# Patient Record
Sex: MALE | Race: ASIAN
[De-identification: names, ages, dates, MRNs, and addresses within clinical notes are randomized per-mention and may not be internally consistent; named-entity substitution may affect disease eponyms.]

---

## 2017-03-20 ENCOUNTER — NON-APPOINTMENT (OUTPATIENT)
Age: 67
End: 2017-03-20

## 2017-03-20 ENCOUNTER — APPOINTMENT (OUTPATIENT)
Dept: CARDIOLOGY | Facility: CLINIC | Age: 67
End: 2017-03-20

## 2017-03-20 VITALS
HEART RATE: 62 BPM | WEIGHT: 151 LBS | HEIGHT: 63 IN | DIASTOLIC BLOOD PRESSURE: 64 MMHG | OXYGEN SATURATION: 97 % | SYSTOLIC BLOOD PRESSURE: 148 MMHG | BODY MASS INDEX: 26.75 KG/M2

## 2017-03-20 VITALS — SYSTOLIC BLOOD PRESSURE: 130 MMHG | DIASTOLIC BLOOD PRESSURE: 72 MMHG

## 2017-03-24 ENCOUNTER — APPOINTMENT (OUTPATIENT)
Dept: CARDIOLOGY | Facility: CLINIC | Age: 67
End: 2017-03-24

## 2017-03-28 ENCOUNTER — APPOINTMENT (OUTPATIENT)
Dept: CARDIOLOGY | Facility: CLINIC | Age: 67
End: 2017-03-28

## 2017-03-30 ENCOUNTER — APPOINTMENT (OUTPATIENT)
Dept: CARDIOLOGY | Facility: CLINIC | Age: 67
End: 2017-03-30

## 2017-04-10 ENCOUNTER — APPOINTMENT (OUTPATIENT)
Dept: CARDIOLOGY | Facility: CLINIC | Age: 67
End: 2017-04-10

## 2017-04-10 VITALS
HEIGHT: 63 IN | HEART RATE: 71 BPM | BODY MASS INDEX: 26.75 KG/M2 | SYSTOLIC BLOOD PRESSURE: 127 MMHG | DIASTOLIC BLOOD PRESSURE: 72 MMHG | WEIGHT: 151 LBS | OXYGEN SATURATION: 98 %

## 2017-06-26 ENCOUNTER — MEDICATION RENEWAL (OUTPATIENT)
Age: 67
End: 2017-06-26

## 2017-06-26 ENCOUNTER — RX RENEWAL (OUTPATIENT)
Age: 67
End: 2017-06-26

## 2017-09-11 ENCOUNTER — NON-APPOINTMENT (OUTPATIENT)
Age: 67
End: 2017-09-11

## 2017-09-11 ENCOUNTER — APPOINTMENT (OUTPATIENT)
Dept: CARDIOLOGY | Facility: CLINIC | Age: 67
End: 2017-09-11
Payer: COMMERCIAL

## 2017-09-11 VITALS
BODY MASS INDEX: 26.75 KG/M2 | WEIGHT: 151 LBS | OXYGEN SATURATION: 98 % | DIASTOLIC BLOOD PRESSURE: 68 MMHG | HEART RATE: 72 BPM | HEIGHT: 63 IN | SYSTOLIC BLOOD PRESSURE: 136 MMHG

## 2017-09-11 PROCEDURE — 93000 ELECTROCARDIOGRAM COMPLETE: CPT

## 2017-09-11 PROCEDURE — 99215 OFFICE O/P EST HI 40 MIN: CPT | Mod: 25

## 2017-09-14 ENCOUNTER — INPATIENT (INPATIENT)
Facility: HOSPITAL | Age: 67
LOS: 0 days | Discharge: HOME | DRG: 247 | End: 2017-09-15
Attending: INTERNAL MEDICINE | Admitting: INTERNAL MEDICINE
Payer: COMMERCIAL

## 2017-09-14 VITALS
HEART RATE: 66 BPM | TEMPERATURE: 99 F | HEIGHT: 63 IN | DIASTOLIC BLOOD PRESSURE: 69 MMHG | WEIGHT: 151.02 LBS | RESPIRATION RATE: 15 BRPM | OXYGEN SATURATION: 98 % | SYSTOLIC BLOOD PRESSURE: 150 MMHG

## 2017-09-14 DIAGNOSIS — Z01.818 ENCOUNTER FOR OTHER PREPROCEDURAL EXAMINATION: ICD-10-CM

## 2017-09-14 DIAGNOSIS — Z98.49 CATARACT EXTRACTION STATUS, UNSPECIFIED EYE: Chronic | ICD-10-CM

## 2017-09-14 DIAGNOSIS — I25.10 ATHEROSCLEROTIC HEART DISEASE OF NATIVE CORONARY ARTERY WITHOUT ANGINA PECTORIS: ICD-10-CM

## 2017-09-14 DIAGNOSIS — Z98.890 OTHER SPECIFIED POSTPROCEDURAL STATES: Chronic | ICD-10-CM

## 2017-09-14 DIAGNOSIS — Z95.9 PRESENCE OF CARDIAC AND VASCULAR IMPLANT AND GRAFT, UNSPECIFIED: Chronic | ICD-10-CM

## 2017-09-14 LAB
ALBUMIN SERPL ELPH-MCNC: 4.2 G/DL — SIGNIFICANT CHANGE UP (ref 3.3–5)
ALP SERPL-CCNC: 58 U/L — SIGNIFICANT CHANGE UP (ref 40–120)
ALT FLD-CCNC: 24 U/L RC — SIGNIFICANT CHANGE UP (ref 10–45)
ANION GAP SERPL CALC-SCNC: 11 MMOL/L — SIGNIFICANT CHANGE UP (ref 5–17)
AST SERPL-CCNC: 29 U/L — SIGNIFICANT CHANGE UP (ref 10–40)
BILIRUB SERPL-MCNC: 0.7 MG/DL — SIGNIFICANT CHANGE UP (ref 0.2–1.2)
BUN SERPL-MCNC: 12 MG/DL — SIGNIFICANT CHANGE UP (ref 7–23)
CALCIUM SERPL-MCNC: 9.8 MG/DL — SIGNIFICANT CHANGE UP (ref 8.4–10.5)
CHLORIDE SERPL-SCNC: 104 MMOL/L — SIGNIFICANT CHANGE UP (ref 96–108)
CO2 SERPL-SCNC: 26 MMOL/L — SIGNIFICANT CHANGE UP (ref 22–31)
CREAT SERPL-MCNC: 0.62 MG/DL — SIGNIFICANT CHANGE UP (ref 0.5–1.3)
GLUCOSE SERPL-MCNC: 104 MG/DL — HIGH (ref 70–99)
HCT VFR BLD CALC: 45.1 % — SIGNIFICANT CHANGE UP (ref 39–50)
HGB BLD-MCNC: 15.3 G/DL — SIGNIFICANT CHANGE UP (ref 13–17)
MCHC RBC-ENTMCNC: 30 PG — SIGNIFICANT CHANGE UP (ref 27–34)
MCHC RBC-ENTMCNC: 34 GM/DL — SIGNIFICANT CHANGE UP (ref 32–36)
MCV RBC AUTO: 88.3 FL — SIGNIFICANT CHANGE UP (ref 80–100)
PLATELET # BLD AUTO: 197 K/UL — SIGNIFICANT CHANGE UP (ref 150–400)
POTASSIUM SERPL-MCNC: 4.2 MMOL/L — SIGNIFICANT CHANGE UP (ref 3.5–5.3)
POTASSIUM SERPL-SCNC: 4.2 MMOL/L — SIGNIFICANT CHANGE UP (ref 3.5–5.3)
PROT SERPL-MCNC: 7.4 G/DL — SIGNIFICANT CHANGE UP (ref 6–8.3)
RBC # BLD: 5.1 M/UL — SIGNIFICANT CHANGE UP (ref 4.2–5.8)
RBC # FLD: 12 % — SIGNIFICANT CHANGE UP (ref 10.3–14.5)
SODIUM SERPL-SCNC: 141 MMOL/L — SIGNIFICANT CHANGE UP (ref 135–145)
WBC # BLD: 7.7 K/UL — SIGNIFICANT CHANGE UP (ref 3.8–10.5)
WBC # FLD AUTO: 7.7 K/UL — SIGNIFICANT CHANGE UP (ref 3.8–10.5)

## 2017-09-14 PROCEDURE — 92928 PRQ TCAT PLMT NTRAC ST 1 LES: CPT | Mod: LD,GC

## 2017-09-14 PROCEDURE — 93010 ELECTROCARDIOGRAM REPORT: CPT

## 2017-09-14 PROCEDURE — 93454 CORONARY ARTERY ANGIO S&I: CPT | Mod: 26,59,GC

## 2017-09-14 PROCEDURE — 99152 MOD SED SAME PHYS/QHP 5/>YRS: CPT | Mod: GC

## 2017-09-14 RX ORDER — ATORVASTATIN CALCIUM 80 MG/1
20 TABLET, FILM COATED ORAL AT BEDTIME
Qty: 0 | Refills: 0 | Status: DISCONTINUED | OUTPATIENT
Start: 2017-09-14 | End: 2017-09-15

## 2017-09-14 RX ORDER — METOPROLOL TARTRATE 50 MG
50 TABLET ORAL DAILY
Qty: 0 | Refills: 0 | Status: DISCONTINUED | OUTPATIENT
Start: 2017-09-14 | End: 2017-09-15

## 2017-09-14 RX ORDER — CLOPIDOGREL BISULFATE 75 MG/1
75 TABLET, FILM COATED ORAL DAILY
Qty: 0 | Refills: 0 | Status: DISCONTINUED | OUTPATIENT
Start: 2017-09-14 | End: 2017-09-15

## 2017-09-14 RX ORDER — ASPIRIN/CALCIUM CARB/MAGNESIUM 324 MG
81 TABLET ORAL DAILY
Qty: 0 | Refills: 0 | Status: DISCONTINUED | OUTPATIENT
Start: 2017-09-14 | End: 2017-09-15

## 2017-09-14 RX ADMIN — ATORVASTATIN CALCIUM 20 MILLIGRAM(S): 80 TABLET, FILM COATED ORAL at 21:06

## 2017-09-14 NOTE — H&P CARDIOLOGY - PSH
History of colonoscopy    S/P angioplasty with stent  2006 and 2009  Status post cataract extraction and insertion of intraocular lens, unspecified laterality History of colonoscopy    S/P angioplasty with stent  2006 mCX 85% x1 LVEF 58% and 2009 - pRCA 85% HOLLY x 1  Status post cataract extraction and insertion of intraocular lens, unspecified laterality

## 2017-09-14 NOTE — CHART NOTE - NSCHARTNOTEFT_GEN_A_CORE
Removal of Radial Band    Pulses in the (right upper extremity are (palpable). The patient was placed in the supine position. The insertion site was identified and the band deflated per protocol. The radial band was removed slowly. Direct pressure was applied for  ___15___ minutes.      Monitoring of the (right wrists and both upper extremities including neuro-vascular checks and vital signs every 15 minutes x 4.    Complications: None/Other.  site without bleeding or hematoma , palpable pulses  with good capillary refill    Comments: Activity restrictions, reportable symptoms   and site care reviewed with patient

## 2017-09-14 NOTE — H&P CARDIOLOGY - FAMILY HISTORY
Sibling  Still living? Yes, Estimated age: Age Unknown  Family history of ischemic heart disease, Age at diagnosis: Age Unknown

## 2017-09-14 NOTE — H&P CARDIOLOGY - PMH
Coronary artery disease of native artery of native heart with stable angina pectoris    Hyperlipidemia, unspecified hyperlipidemia type Coronary artery disease of native artery of native heart with stable angina pectoris    GERD without esophagitis    Hyperlipidemia, unspecified hyperlipidemia type    Non-ST elevation (NSTEMI) myocardial infarction

## 2017-09-14 NOTE — H&P CARDIOLOGY - HISTORY OF PRESENT ILLNESS
68 y/o male with pmh of HLD, CAD s/p PCI/HOLLY 2006 mCX 85% x1 LVEF 58% and 2009 -pRCA 85% HOLLY x 1, GERD 66 y/o male with strong FH of CAD (Brother CABG @73; Brother Stents @ 68 and sister Stents @58) with pmh of HLD, CAD with MI s/p PCI/HOLLY 2006 mCX 85% x1 LVEF 58% and 2009 -pRCA 85% HOLLY x 1, GERD seen and evaluated by Dr. Pearson for reports of intermittent chest pain about 1 month ago at rest lasting for about 10 minutes with several episodes over the month lasting for minutes.  Last NST done in March and was ok (as per pt).  Pt presents for cardiac cath for further evaluation of his CAD 2/2 his symptoms.  Pt denies cp, sob or palpitations presently.

## 2017-09-15 ENCOUNTER — TRANSCRIPTION ENCOUNTER (OUTPATIENT)
Age: 67
End: 2017-09-15

## 2017-09-15 VITALS
SYSTOLIC BLOOD PRESSURE: 112 MMHG | HEART RATE: 63 BPM | TEMPERATURE: 98 F | DIASTOLIC BLOOD PRESSURE: 70 MMHG | OXYGEN SATURATION: 97 % | RESPIRATION RATE: 18 BRPM

## 2017-09-15 LAB
ANION GAP SERPL CALC-SCNC: 11 MMOL/L — SIGNIFICANT CHANGE UP (ref 5–17)
BASOPHILS # BLD AUTO: 0 K/UL — SIGNIFICANT CHANGE UP (ref 0–0.2)
BASOPHILS NFR BLD AUTO: 0.5 % — SIGNIFICANT CHANGE UP (ref 0–2)
BUN SERPL-MCNC: 13 MG/DL — SIGNIFICANT CHANGE UP (ref 7–23)
CALCIUM SERPL-MCNC: 9.8 MG/DL — SIGNIFICANT CHANGE UP (ref 8.4–10.5)
CHLORIDE SERPL-SCNC: 101 MMOL/L — SIGNIFICANT CHANGE UP (ref 96–108)
CO2 SERPL-SCNC: 27 MMOL/L — SIGNIFICANT CHANGE UP (ref 22–31)
CREAT SERPL-MCNC: 0.7 MG/DL — SIGNIFICANT CHANGE UP (ref 0.5–1.3)
EOSINOPHIL # BLD AUTO: 0.3 K/UL — SIGNIFICANT CHANGE UP (ref 0–0.5)
EOSINOPHIL NFR BLD AUTO: 4 % — SIGNIFICANT CHANGE UP (ref 0–6)
GLUCOSE SERPL-MCNC: 92 MG/DL — SIGNIFICANT CHANGE UP (ref 70–99)
HCT VFR BLD CALC: 46.6 % — SIGNIFICANT CHANGE UP (ref 39–50)
HGB BLD-MCNC: 15.8 G/DL — SIGNIFICANT CHANGE UP (ref 13–17)
LYMPHOCYTES # BLD AUTO: 1.8 K/UL — SIGNIFICANT CHANGE UP (ref 1–3.3)
LYMPHOCYTES # BLD AUTO: 21.8 % — SIGNIFICANT CHANGE UP (ref 13–44)
MCHC RBC-ENTMCNC: 29.8 PG — SIGNIFICANT CHANGE UP (ref 27–34)
MCHC RBC-ENTMCNC: 33.9 GM/DL — SIGNIFICANT CHANGE UP (ref 32–36)
MCV RBC AUTO: 88 FL — SIGNIFICANT CHANGE UP (ref 80–100)
MONOCYTES # BLD AUTO: 0.8 K/UL — SIGNIFICANT CHANGE UP (ref 0–0.9)
MONOCYTES NFR BLD AUTO: 9.3 % — SIGNIFICANT CHANGE UP (ref 2–14)
NEUTROPHILS # BLD AUTO: 5.3 K/UL — SIGNIFICANT CHANGE UP (ref 1.8–7.4)
NEUTROPHILS NFR BLD AUTO: 64.5 % — SIGNIFICANT CHANGE UP (ref 43–77)
PLATELET # BLD AUTO: 218 K/UL — SIGNIFICANT CHANGE UP (ref 150–400)
POTASSIUM SERPL-MCNC: 4.6 MMOL/L — SIGNIFICANT CHANGE UP (ref 3.5–5.3)
POTASSIUM SERPL-SCNC: 4.6 MMOL/L — SIGNIFICANT CHANGE UP (ref 3.5–5.3)
RBC # BLD: 5.29 M/UL — SIGNIFICANT CHANGE UP (ref 4.2–5.8)
RBC # FLD: 11.9 % — SIGNIFICANT CHANGE UP (ref 10.3–14.5)
SODIUM SERPL-SCNC: 139 MMOL/L — SIGNIFICANT CHANGE UP (ref 135–145)
WBC # BLD: 8.3 K/UL — SIGNIFICANT CHANGE UP (ref 3.8–10.5)
WBC # FLD AUTO: 8.3 K/UL — SIGNIFICANT CHANGE UP (ref 3.8–10.5)

## 2017-09-15 PROCEDURE — C1874: CPT

## 2017-09-15 PROCEDURE — C1725: CPT

## 2017-09-15 PROCEDURE — 99152 MOD SED SAME PHYS/QHP 5/>YRS: CPT

## 2017-09-15 PROCEDURE — C1769: CPT

## 2017-09-15 PROCEDURE — 80048 BASIC METABOLIC PNL TOTAL CA: CPT

## 2017-09-15 PROCEDURE — C1887: CPT

## 2017-09-15 PROCEDURE — 80053 COMPREHEN METABOLIC PANEL: CPT

## 2017-09-15 PROCEDURE — C9600: CPT | Mod: LD

## 2017-09-15 PROCEDURE — 93005 ELECTROCARDIOGRAM TRACING: CPT

## 2017-09-15 PROCEDURE — 99153 MOD SED SAME PHYS/QHP EA: CPT

## 2017-09-15 PROCEDURE — 85027 COMPLETE CBC AUTOMATED: CPT

## 2017-09-15 PROCEDURE — 93454 CORONARY ARTERY ANGIO S&I: CPT | Mod: 59

## 2017-09-15 RX ORDER — ASPIRIN/CALCIUM CARB/MAGNESIUM 324 MG
1 TABLET ORAL
Qty: 0 | Refills: 0 | COMMUNITY

## 2017-09-15 RX ORDER — CLOPIDOGREL BISULFATE 75 MG/1
1 TABLET, FILM COATED ORAL
Qty: 30 | Refills: 0 | OUTPATIENT
Start: 2017-09-15

## 2017-09-15 RX ORDER — METOPROLOL TARTRATE 50 MG
1 TABLET ORAL
Qty: 0 | Refills: 0 | COMMUNITY

## 2017-09-15 RX ORDER — CLOPIDOGREL BISULFATE 75 MG/1
1 TABLET, FILM COATED ORAL
Qty: 30 | Refills: 0 | OUTPATIENT
Start: 2017-09-15 | End: 2017-10-15

## 2017-09-15 RX ORDER — METOPROLOL TARTRATE 50 MG
1 TABLET ORAL
Qty: 0 | Refills: 0 | DISCHARGE
Start: 2017-09-15

## 2017-09-15 RX ORDER — ASPIRIN/CALCIUM CARB/MAGNESIUM 324 MG
1 TABLET ORAL
Qty: 0 | Refills: 0 | COMMUNITY
Start: 2017-09-15

## 2017-09-15 RX ADMIN — CLOPIDOGREL BISULFATE 75 MILLIGRAM(S): 75 TABLET, FILM COATED ORAL at 11:22

## 2017-09-15 RX ADMIN — Medication 50 MILLIGRAM(S): at 05:06

## 2017-09-15 RX ADMIN — Medication 81 MILLIGRAM(S): at 11:22

## 2017-09-15 NOTE — DISCHARGE NOTE ADULT - PATIENT PORTAL LINK FT
“You can access the Follow[employer] Patient Portal, offered by [facility], by registering with the following website: [email or web address]”

## 2017-09-15 NOTE — DISCHARGE NOTE ADULT - CARE PLAN
Principal Discharge DX:	Chest pain  Goal:	Remain without chest pain  Instructions for follow-up, activity and diet:	HOME CARE INSTRUCTIONS  For the next few days, avoid physical activities that bring on chest pain. Continue physical activities as directed.  Do not smoke.  Avoid drinking alcohol.   Only take over-the-counter or prescription medicine for pain, discomfort, or fever as directed by your caregiver.  Follow your caregiver's suggestions for further testing if your chest pain does not go away.  Keep any follow-up appointments you made. If you do not go to an appointment, you could develop lasting (chronic) problems with pain. If there is any problem keeping an appointment, you must call to reschedule.   SEEK MEDICAL CARE IF:  You think you are having problems from the medicine you are taking. Read your medicine instructions carefully.  Your chest pain does not go away, even after treatment.  You develop a rash with blisters on your chest.  SEEK IMMEDIATE MEDICAL CARE IF:  You have increased chest pain or pain that spreads to your arm, neck, jaw, back, or abdomen.   You develop shortness of breath, an increasing cough, or you are coughing up blood.  You have severe back or abdominal pain, feel nauseous, or vomit.  You develop severe weakness, fainting, or chills.  You have a fever.  THIS IS AN EMERGENCY. Do not wait to see if the pain will go away. Get medical help at once. Call your local emergency services. Do not drive yourself to the hospital.  Secondary Diagnosis:	Coronary artery disease of native artery of native heart with stable angina pectoris  Instructions for follow-up, activity and diet:	Coronary artery disease is a condition where the arteries the supply the heart muscle get clogged with fatty deposits & puts you at risk for a heart attack  Call your doctor if you have any new pain, pressure, or discomfort in the center of your chest, pain, tingling or discomfort in arms, back, neck, jaw, or stomach, shortness of breath, nausea, vomiting, burping or heartburn, sweating, cold and clammy skin, racing or abnormal heartbeat for more than 10 minutes or if they keep coming & going.  Call 911 and do not tr to get to hospital by care  You can help yourself with lifestyle changes (quitting smoking if you smoke), eat lots of fruits & vegetables & low fat dairy products, not a lot of meat & fatty foods, walk or some form of physical activity most days of the week, lose weight if you are overweight  Take your cardiac medication as prescribed to lower cholesterol, to lower blood pressure, aspirin to prevent blood clots, and diabetes control  Make sure to keep appointments with doctor for cardiac follow up care  Secondary Diagnosis:	S/P angioplasty with stent  Instructions for follow-up, activity and diet:	Continue your medications. Do not stop Aspirin or Plavix unless instructed by your cardiologist.  No heavy lifting or pushing/pulling or strenuous activity with procedure arm for 2 weeks. No driving for 2 days. No sex for 1 week.  You may shower 24 hours following procedure but no soaking of your wrist in water (such as in a pool, sink, or tub) for 1 week. Check wrist site for bleeding and/or swelling daily following procedure. Call your doctor/cardiologist immediately for bleeding or swelling or if you have increased/persistent pain or drainage at the wrist site or if you have numbness, tingling or blue or white coloring of your hand or fingers.  Follow up with your cardiologist in 1- 2 weeks. You may call [facility] at [phone] or [phone] after office hours and weekends with any questions or concerns following your procedure.

## 2017-09-15 NOTE — DISCHARGE NOTE ADULT - ADDITIONAL INSTRUCTIONS
Make appointments to follow up with your out patient physicians.  Bring all discharge paperwork including discharge medication list to your follow up appointments.  Follow up with your cardiologist in 1-2 weeks.

## 2017-09-15 NOTE — PROGRESS NOTE ADULT - SUBJECTIVE AND OBJECTIVE BOX
24H hour events: Pt s/p PCI to mLAD without apparent acute complication.     MEDICATIONS:  aspirin enteric coated 81 milliGRAM(s) Oral daily  metoprolol succinate ER 50 milliGRAM(s) Oral daily  clopidogrel Tablet 75 milliGRAM(s) Oral daily            atorvastatin 20 milliGRAM(s) Oral at bedtime        REVIEW OF SYSTEMS:  General: no fatigue/malaise, weight loss/gain.  Skin: no rashes.  Ophthalmologic: no blurred vision, no loss of vision. 	  ENT: no sore throat, rhinorrhea, sinus congestion.  Respiratory: no SOB, cough or wheeze.  Gastrointestinal:  no N/V/D, no melena/hematemesis/hematochezia.  Genitourinary: no dysuria/hesitancy or hematuria.  Musculoskeletal: no myalgias or arthralgias.  Neurological: no changes in vision or hearing, no lightheadedness/dizziness, no syncope/near syncope	  Psychiatric: no unusual stress/anxiety.   Hematology/Lymphatics: no unusual bleeding, bruising and no lymphadenopathy.  Endocrine: no unusual thirst.   All others negative except as stated above and in HPI.    PHYSICAL EXAM:  T(C): 36.9 (09-15-17 @ 11:38), Max: 36.9 (09-15-17 @ 11:38)  HR: 63 (09-15-17 @ 11:38) (62 - 79)  BP: 112/70 (09-15-17 @ 11:38) (112/70 - 158/87)  RR: 18 (09-15-17 @ 11:38) (18 - 18)  SpO2: 97% (09-15-17 @ 11:38) (97% - 100%)  Wt(kg): --  I&O's Summary    15 Sep 2017 07:01  -  15 Sep 2017 13:04  --------------------------------------------------------  IN: 360 mL / OUT: 0 mL / NET: 360 mL        Appearance: Normal	  HEENT:   Normal oral mucosa, PERRL, EOMI	  Lymphatic: No lymphadenopathy  Cardiovascular: Normal S1 S2, No JVD, No murmurs, No edema  Respiratory: Lungs clear to auscultation	  Psychiatry: A & O x 3, Mood & affect appropriate  Gastrointestinal:  Soft, Non-tender, + BS	  Skin: No rashes, No ecchymoses, No cyanosis	  Neurologic: Non-focal  Extremities: Normal range of motion, No clubbing, cyanosis or edema.   Vascular: Peripheral pulses palpable 2+ bilaterally. Right RA access site. No evidence of significant hematoma or neurovascular compromise.          LABS:	 	    CBC Full  -  ( 15 Sep 2017 09:28 )  WBC Count : 8.3 K/uL  Hemoglobin : 15.8 g/dL  Hematocrit : 46.6 %  Platelet Count - Automated : 218 K/uL  Mean Cell Volume : 88.0 fl  Mean Cell Hemoglobin : 29.8 pg  Mean Cell Hemoglobin Concentration : 33.9 gm/dL  Auto Neutrophil # : 5.3 K/uL  Auto Lymphocyte # : 1.8 K/uL  Auto Monocyte # : 0.8 K/uL  Auto Eosinophil # : 0.3 K/uL  Auto Basophil # : 0.0 K/uL  Auto Neutrophil % : 64.5 %  Auto Lymphocyte % : 21.8 %  Auto Monocyte % : 9.3 %  Auto Eosinophil % : 4.0 %  Auto Basophil % : 0.5 %    09-15    139  |  101  |  13  ----------------------------<  92  4.6   |  27  |  0.70  09-14    141  |  104  |  12  ----------------------------<  104<H>  4.2   |  26  |  0.62    Ca    9.8      15 Sep 2017 09:28  Ca    9.8      14 Sep 2017 10:48    TPro  7.4  /  Alb  4.2  /  TBili  0.7  /  DBili  x   /  AST  29  /  ALT  24  /  AlkPhos  58  09-14  	  ASSESSMENT/PLAN:     1) CAD s/p PCI to mLAD - continue DAPT with ASA/Plavix for at leaset one year or unless instructed otherwise by Cardiologist  - continue statin/BB	  - follow up with primary Cardiologist in 1 - 2 weeks  - OK for DC from Interventional Cardiology standpoint     Luis Hopson  Cardiology Fellow  [phone] 24H hour events: Pt s/p PCI to mLAD without apparent acute complication.     MEDICATIONS:  aspirin enteric coated 81 milliGRAM(s) Oral daily  metoprolol succinate ER 50 milliGRAM(s) Oral daily  clopidogrel Tablet 75 milliGRAM(s) Oral daily            atorvastatin 20 milliGRAM(s) Oral at bedtime        REVIEW OF SYSTEMS:  General: no fatigue/malaise, weight loss/gain.  Skin: no rashes.  Ophthalmologic: no blurred vision, no loss of vision. 	  ENT: no sore throat, rhinorrhea, sinus congestion.  Respiratory: no SOB, cough or wheeze.  Gastrointestinal:  no N/V/D, no melena/hematemesis/hematochezia.  Genitourinary: no dysuria/hesitancy or hematuria.  Musculoskeletal: no myalgias or arthralgias.  Neurological: no changes in vision or hearing, no lightheadedness/dizziness, no syncope/near syncope	  Psychiatric: no unusual stress/anxiety.   Hematology/Lymphatics: no unusual bleeding, bruising and no lymphadenopathy.  Endocrine: no unusual thirst.   All others negative except as stated above and in HPI.    PHYSICAL EXAM:  T(C): 36.9 (09-15-17 @ 11:38), Max: 36.9 (09-15-17 @ 11:38)  HR: 63 (09-15-17 @ 11:38) (62 - 79)  BP: 112/70 (09-15-17 @ 11:38) (112/70 - 158/87)  RR: 18 (09-15-17 @ 11:38) (18 - 18)  SpO2: 97% (09-15-17 @ 11:38) (97% - 100%)  Wt(kg): --  I&O's Summary    15 Sep 2017 07:01  -  15 Sep 2017 13:04  --------------------------------------------------------  IN: 360 mL / OUT: 0 mL / NET: 360 mL        Appearance: Normal	  HEENT:   Normal oral mucosa, PERRL, EOMI	  Lymphatic: No lymphadenopathy  Cardiovascular: Normal S1 S2, No JVD, No murmurs, No edema  Respiratory: Lungs clear to auscultation	  Psychiatry: A & O x 3, Mood & affect appropriate  Gastrointestinal:  Soft, Non-tender, + BS	  Skin: No rashes, No ecchymoses, No cyanosis	  Neurologic: Non-focal  Extremities: Normal range of motion, No clubbing, cyanosis or edema.   Vascular: Peripheral pulses palpable 2+ bilaterally. Right RA access site. No evidence of significant hematoma or neurovascular compromise.          LABS:	 	    CBC Full  -  ( 15 Sep 2017 09:28 )  WBC Count : 8.3 K/uL  Hemoglobin : 15.8 g/dL  Hematocrit : 46.6 %  Platelet Count - Automated : 218 K/uL  Mean Cell Volume : 88.0 fl  Mean Cell Hemoglobin : 29.8 pg  Mean Cell Hemoglobin Concentration : 33.9 gm/dL  Auto Neutrophil # : 5.3 K/uL  Auto Lymphocyte # : 1.8 K/uL  Auto Monocyte # : 0.8 K/uL  Auto Eosinophil # : 0.3 K/uL  Auto Basophil # : 0.0 K/uL  Auto Neutrophil % : 64.5 %  Auto Lymphocyte % : 21.8 %  Auto Monocyte % : 9.3 %  Auto Eosinophil % : 4.0 %  Auto Basophil % : 0.5 %    09-15    139  |  101  |  13  ----------------------------<  92  4.6   |  27  |  0.70  09-14    141  |  104  |  12  ----------------------------<  104<H>  4.2   |  26  |  0.62    Ca    9.8      15 Sep 2017 09:28  Ca    9.8      14 Sep 2017 10:48    TPro  7.4  /  Alb  4.2  /  TBili  0.7  /  DBili  x   /  AST  29  /  ALT  24  /  AlkPhos  58  09-14  	  ASSESSMENT/PLAN:     1) CAD s/p PCI to mLAD - continue DAPT with ASA/Plavix for at least one year or unless instructed otherwise by Cardiologist  - continue statin/BB	  - follow up with primary Cardiologist in 1 - 2 weeks  - OK for DC from Interventional Cardiology standpoint    Luis Hopson  Cardiology Fellow  [phone]

## 2017-09-15 NOTE — CHART NOTE - NSCHARTNOTEFT_GEN_A_CORE
Interventional Cardiology    Evaluated Right RA access site. No evidence of significant hematoma or neurovascular compromise.

## 2017-09-15 NOTE — DISCHARGE NOTE ADULT - HOSPITAL COURSE
66 y/o male with strong FH of CAD (Brother CABG @73; Brother Stents @ 68 and sister Stents @58) with pmh of HLD, CAD with MI s/p PCI/HOLLY 2006 mCX 85% x1 LVEF 58% and 2009 -pRCA 85% HOLLY x 1, GERD seen and evaluated by Dr. Pearson for reports of intermittent chest pain about 1 month ago at rest lasting for about 10 minutes with several episodes over the month lasting for minutes.  Last NST done in March and was ok (as per pt).  Pt presents for cardiac cath for further evaluation of his CAD 2/2 his symptoms.  Pt denies cp, sob or palpitations presently.    CAD s/p PCI to mLAD - continue DAPT with ASA/Plavix for at least one year or unless instructed otherwise by Cardiologist.  Continue statin/BB, follow up with primary Cardiologist in 1 - 2 weeks

## 2017-09-15 NOTE — DISCHARGE NOTE ADULT - SECONDARY DIAGNOSIS.
Coronary artery disease of native artery of native heart with stable angina pectoris S/P angioplasty with stent

## 2017-09-15 NOTE — DISCHARGE NOTE ADULT - MEDICATION SUMMARY - MEDICATIONS TO TAKE
I will START or STAY ON the medications listed below when I get home from the hospital:    aspirin 81 mg oral delayed release tablet  -- 1 tab(s) by mouth once a day  -- Indication: For CAD    rosuvastatin 5 mg oral tablet  -- 1 tab(s) by mouth once a day (at bedtime)  -- Indication: For HLD    metoprolol succinate 50 mg oral tablet, extended release  -- 1 tab(s) by mouth once a day  -- Indication: For Coronary artery disease of native artery of native heart with stable angina pectoris

## 2017-09-15 NOTE — DISCHARGE NOTE ADULT - PLAN OF CARE
Remain without chest pain HOME CARE INSTRUCTIONS  For the next few days, avoid physical activities that bring on chest pain. Continue physical activities as directed.  Do not smoke.  Avoid drinking alcohol.   Only take over-the-counter or prescription medicine for pain, discomfort, or fever as directed by your caregiver.  Follow your caregiver's suggestions for further testing if your chest pain does not go away.  Keep any follow-up appointments you made. If you do not go to an appointment, you could develop lasting (chronic) problems with pain. If there is any problem keeping an appointment, you must call to reschedule.   SEEK MEDICAL CARE IF:  You think you are having problems from the medicine you are taking. Read your medicine instructions carefully.  Your chest pain does not go away, even after treatment.  You develop a rash with blisters on your chest.  SEEK IMMEDIATE MEDICAL CARE IF:  You have increased chest pain or pain that spreads to your arm, neck, jaw, back, or abdomen.   You develop shortness of breath, an increasing cough, or you are coughing up blood.  You have severe back or abdominal pain, feel nauseous, or vomit.  You develop severe weakness, fainting, or chills.  You have a fever.  THIS IS AN EMERGENCY. Do not wait to see if the pain will go away. Get medical help at once. Call your local emergency services. Do not drive yourself to the hospital. Coronary artery disease is a condition where the arteries the supply the heart muscle get clogged with fatty deposits & puts you at risk for a heart attack  Call your doctor if you have any new pain, pressure, or discomfort in the center of your chest, pain, tingling or discomfort in arms, back, neck, jaw, or stomach, shortness of breath, nausea, vomiting, burping or heartburn, sweating, cold and clammy skin, racing or abnormal heartbeat for more than 10 minutes or if they keep coming & going.  Call 911 and do not tr to get to hospital by care  You can help yourself with lifestyle changes (quitting smoking if you smoke), eat lots of fruits & vegetables & low fat dairy products, not a lot of meat & fatty foods, walk or some form of physical activity most days of the week, lose weight if you are overweight  Take your cardiac medication as prescribed to lower cholesterol, to lower blood pressure, aspirin to prevent blood clots, and diabetes control  Make sure to keep appointments with doctor for cardiac follow up care Continue your medications. Do not stop Aspirin or Plavix unless instructed by your cardiologist.  No heavy lifting or pushing/pulling or strenuous activity with procedure arm for 2 weeks. No driving for 2 days. No sex for 1 week.  You may shower 24 hours following procedure but no soaking of your wrist in water (such as in a pool, sink, or tub) for 1 week. Check wrist site for bleeding and/or swelling daily following procedure. Call your doctor/cardiologist immediately for bleeding or swelling or if you have increased/persistent pain or drainage at the wrist site or if you have numbness, tingling or blue or white coloring of your hand or fingers.  Follow up with your cardiologist in 1- 2 weeks. You may call [facility] at [phone] or [phone] after office hours and weekends with any questions or concerns following your procedure.

## 2017-09-15 NOTE — DISCHARGE NOTE ADULT - CARE PROVIDER_API CALL
Rashawn Borrego), Cardiovascular Disease; Internal Medicine  [address] [place] [address]  Phone: [phone]  Fax: [phone]

## 2017-09-18 ENCOUNTER — APPOINTMENT (OUTPATIENT)
Dept: CARDIOLOGY | Facility: CLINIC | Age: 67
End: 2017-09-18
Payer: COMMERCIAL

## 2017-09-18 ENCOUNTER — RECORD ABSTRACTING (OUTPATIENT)
Age: 67
End: 2017-09-18

## 2017-09-18 VITALS
DIASTOLIC BLOOD PRESSURE: 73 MMHG | BODY MASS INDEX: 25.87 KG/M2 | OXYGEN SATURATION: 99 % | HEIGHT: 63 IN | SYSTOLIC BLOOD PRESSURE: 133 MMHG | HEART RATE: 68 BPM | WEIGHT: 146 LBS

## 2017-09-18 PROBLEM — E78.5 HYPERLIPIDEMIA, UNSPECIFIED: Chronic | Status: ACTIVE | Noted: 2017-09-14

## 2017-09-18 PROBLEM — I21.4 NON-ST ELEVATION (NSTEMI) MYOCARDIAL INFARCTION: Chronic | Status: ACTIVE | Noted: 2017-09-14

## 2017-09-18 PROBLEM — I25.118 ATHEROSCLEROTIC HEART DISEASE OF NATIVE CORONARY ARTERY WITH OTHER FORMS OF ANGINA PECTORIS: Chronic | Status: ACTIVE | Noted: 2017-09-14

## 2017-09-18 PROBLEM — K21.9 GASTRO-ESOPHAGEAL REFLUX DISEASE WITHOUT ESOPHAGITIS: Chronic | Status: ACTIVE | Noted: 2017-09-14

## 2017-09-18 PROCEDURE — 99214 OFFICE O/P EST MOD 30 MIN: CPT | Mod: 25

## 2017-09-18 PROCEDURE — 93000 ELECTROCARDIOGRAM COMPLETE: CPT

## 2017-09-19 ENCOUNTER — NON-APPOINTMENT (OUTPATIENT)
Age: 67
End: 2017-09-19

## 2017-10-10 ENCOUNTER — MEDICATION RENEWAL (OUTPATIENT)
Age: 67
End: 2017-10-10

## 2017-10-16 ENCOUNTER — INPATIENT (INPATIENT)
Facility: HOSPITAL | Age: 67
LOS: 1 days | Discharge: HOME | DRG: 247 | End: 2017-10-18
Attending: INTERNAL MEDICINE | Admitting: EMERGENCY MEDICINE
Payer: COMMERCIAL

## 2017-10-16 VITALS
HEART RATE: 68 BPM | DIASTOLIC BLOOD PRESSURE: 71 MMHG | TEMPERATURE: 98 F | OXYGEN SATURATION: 98 % | RESPIRATION RATE: 18 BRPM | SYSTOLIC BLOOD PRESSURE: 181 MMHG

## 2017-10-16 DIAGNOSIS — Z98.49 CATARACT EXTRACTION STATUS, UNSPECIFIED EYE: Chronic | ICD-10-CM

## 2017-10-16 DIAGNOSIS — Z98.890 OTHER SPECIFIED POSTPROCEDURAL STATES: Chronic | ICD-10-CM

## 2017-10-16 DIAGNOSIS — Z95.9 PRESENCE OF CARDIAC AND VASCULAR IMPLANT AND GRAFT, UNSPECIFIED: Chronic | ICD-10-CM

## 2017-10-16 LAB
ALBUMIN SERPL ELPH-MCNC: 4.4 G/DL — SIGNIFICANT CHANGE UP (ref 3.3–5)
ALP SERPL-CCNC: 57 U/L — SIGNIFICANT CHANGE UP (ref 40–120)
ALT FLD-CCNC: 15 U/L RC — SIGNIFICANT CHANGE UP (ref 10–45)
ANION GAP SERPL CALC-SCNC: 12 MMOL/L — SIGNIFICANT CHANGE UP (ref 5–17)
APTT BLD: 31.4 SEC — SIGNIFICANT CHANGE UP (ref 27.5–37.4)
AST SERPL-CCNC: 19 U/L — SIGNIFICANT CHANGE UP (ref 10–40)
BASE EXCESS BLDV CALC-SCNC: 3.2 MMOL/L — HIGH (ref -2–2)
BASOPHILS # BLD AUTO: 0.1 K/UL — SIGNIFICANT CHANGE UP (ref 0–0.2)
BASOPHILS NFR BLD AUTO: 1 % — SIGNIFICANT CHANGE UP (ref 0–2)
BILIRUB SERPL-MCNC: 0.5 MG/DL — SIGNIFICANT CHANGE UP (ref 0.2–1.2)
BUN SERPL-MCNC: 13 MG/DL — SIGNIFICANT CHANGE UP (ref 7–23)
CA-I SERPL-SCNC: 1.26 MMOL/L — SIGNIFICANT CHANGE UP (ref 1.12–1.3)
CALCIUM SERPL-MCNC: 9.5 MG/DL — SIGNIFICANT CHANGE UP (ref 8.4–10.5)
CHLORIDE BLDV-SCNC: 106 MMOL/L — SIGNIFICANT CHANGE UP (ref 96–108)
CHLORIDE SERPL-SCNC: 103 MMOL/L — SIGNIFICANT CHANGE UP (ref 96–108)
CK MB CFR SERPL CALC: 3.1 NG/ML — SIGNIFICANT CHANGE UP (ref 0–6.7)
CO2 BLDV-SCNC: 30 MMOL/L — SIGNIFICANT CHANGE UP (ref 22–30)
CO2 SERPL-SCNC: 25 MMOL/L — SIGNIFICANT CHANGE UP (ref 22–31)
CREAT SERPL-MCNC: 0.7 MG/DL — SIGNIFICANT CHANGE UP (ref 0.5–1.3)
EOSINOPHIL # BLD AUTO: 0.2 K/UL — SIGNIFICANT CHANGE UP (ref 0–0.5)
EOSINOPHIL NFR BLD AUTO: 3.1 % — SIGNIFICANT CHANGE UP (ref 0–6)
GAS PNL BLDV: 137 MMOL/L — SIGNIFICANT CHANGE UP (ref 136–145)
GAS PNL BLDV: SIGNIFICANT CHANGE UP
GAS PNL BLDV: SIGNIFICANT CHANGE UP
GLUCOSE BLDV-MCNC: 115 MG/DL — HIGH (ref 70–99)
GLUCOSE SERPL-MCNC: 119 MG/DL — HIGH (ref 70–99)
HCO3 BLDV-SCNC: 29 MMOL/L — SIGNIFICANT CHANGE UP (ref 21–29)
HCT VFR BLD CALC: 43.1 % — SIGNIFICANT CHANGE UP (ref 39–50)
HCT VFR BLDA CALC: 45 % — SIGNIFICANT CHANGE UP (ref 39–50)
HGB BLD CALC-MCNC: 14.6 G/DL — SIGNIFICANT CHANGE UP (ref 13–17)
HGB BLD-MCNC: 14.6 G/DL — SIGNIFICANT CHANGE UP (ref 13–17)
HOROWITZ INDEX BLDV+IHG-RTO: SIGNIFICANT CHANGE UP
INR BLD: 1.08 RATIO — SIGNIFICANT CHANGE UP (ref 0.88–1.16)
LACTATE BLDV-MCNC: 1 MMOL/L — SIGNIFICANT CHANGE UP (ref 0.7–2)
LYMPHOCYTES # BLD AUTO: 2.8 K/UL — SIGNIFICANT CHANGE UP (ref 1–3.3)
LYMPHOCYTES # BLD AUTO: 35.9 % — SIGNIFICANT CHANGE UP (ref 13–44)
MCHC RBC-ENTMCNC: 30.1 PG — SIGNIFICANT CHANGE UP (ref 27–34)
MCHC RBC-ENTMCNC: 33.9 GM/DL — SIGNIFICANT CHANGE UP (ref 32–36)
MCV RBC AUTO: 88.8 FL — SIGNIFICANT CHANGE UP (ref 80–100)
MONOCYTES # BLD AUTO: 0.7 K/UL — SIGNIFICANT CHANGE UP (ref 0–0.9)
MONOCYTES NFR BLD AUTO: 9.6 % — SIGNIFICANT CHANGE UP (ref 2–14)
NEUTROPHILS # BLD AUTO: 3.9 K/UL — SIGNIFICANT CHANGE UP (ref 1.8–7.4)
NEUTROPHILS NFR BLD AUTO: 50.4 % — SIGNIFICANT CHANGE UP (ref 43–77)
PCO2 BLDV: 50 MMHG — SIGNIFICANT CHANGE UP (ref 35–50)
PH BLDV: 7.38 — SIGNIFICANT CHANGE UP (ref 7.35–7.45)
PLATELET # BLD AUTO: 165 K/UL — SIGNIFICANT CHANGE UP (ref 150–400)
PO2 BLDV: 32 MMHG — SIGNIFICANT CHANGE UP (ref 25–45)
POTASSIUM BLDV-SCNC: 4 MMOL/L — SIGNIFICANT CHANGE UP (ref 3.5–5)
POTASSIUM SERPL-MCNC: 3.9 MMOL/L — SIGNIFICANT CHANGE UP (ref 3.5–5.3)
POTASSIUM SERPL-SCNC: 3.9 MMOL/L — SIGNIFICANT CHANGE UP (ref 3.5–5.3)
PROT SERPL-MCNC: 7.4 G/DL — SIGNIFICANT CHANGE UP (ref 6–8.3)
PROTHROM AB SERPL-ACNC: 11.8 SEC — SIGNIFICANT CHANGE UP (ref 9.8–12.7)
RBC # BLD: 4.85 M/UL — SIGNIFICANT CHANGE UP (ref 4.2–5.8)
RBC # FLD: 12 % — SIGNIFICANT CHANGE UP (ref 10.3–14.5)
SAO2 % BLDV: 53 % — LOW (ref 67–88)
SODIUM SERPL-SCNC: 140 MMOL/L — SIGNIFICANT CHANGE UP (ref 135–145)
TROPONIN T SERPL-MCNC: <0.01 NG/ML — SIGNIFICANT CHANGE UP (ref 0–0.06)
WBC # BLD: 7.8 K/UL — SIGNIFICANT CHANGE UP (ref 3.8–10.5)
WBC # FLD AUTO: 7.8 K/UL — SIGNIFICANT CHANGE UP (ref 3.8–10.5)

## 2017-10-16 PROCEDURE — 71010: CPT | Mod: 26

## 2017-10-16 PROCEDURE — 93010 ELECTROCARDIOGRAM REPORT: CPT

## 2017-10-16 NOTE — ED ADULT NURSE NOTE - PMH
Coronary artery disease of native artery of native heart with stable angina pectoris    GERD without esophagitis    Hyperlipidemia, unspecified hyperlipidemia type    Non-ST elevation (NSTEMI) myocardial infarction

## 2017-10-16 NOTE — ED ADULT NURSE NOTE - PSH
History of colonoscopy    S/P angioplasty with stent  2006 mCX 85% x1 LVEF 58% and 2009 - pRCA 85% HOLLY x 1  Status post cataract extraction and insertion of intraocular lens, unspecified laterality

## 2017-10-16 NOTE — ED PROVIDER NOTE - ATTENDING CONTRIBUTION TO CARE
I have seen and evaluated this patient with the resident.   I agree with the findings  unless other wise stated.  I have made appropriate changes in documentations where needed, After my face to face bedside evaluation, I am further  noting: Pt with CAD multiple stents last being 4 weeks ago has developed chest pain with specific amount of walking that he did in past without discomfort No rest chest pain heart and lungs exam wnl EKG LBBB morphology unchanged cardiology eval CDU obs  --Craft

## 2017-10-16 NOTE — CONSULT NOTE ADULT - SUBJECTIVE AND OBJECTIVE BOX
Cardiology Attending Consult Note:    CC: Chest Pain    HPI:     67 y.o M w/ +Family history of CAD (Brother CABG@73, Brother multiple PCI @68, Sister with PCI @58), HL, CAD s/p MI s/p multiple PCI (HOLLY to mLCX 2006, HOLLY to pRCA 2009), most recently s/p HOLLY to pLAD 09/14/2017 for intermittent chest pain on exertion. Since his most recent stent placement on 09/14/2017 he reports several episodes of exertional chest pain, the first occurring 4 days after HOLLY placement. He states the chest pain was sharp, 7/10, localized to his upper left chest area, non-radiating, and lasted for 5-7 minutes. These episodes typically are relieved with rest. He was started on Imdur 30 gm po daily by his PMD, after which the episodes subsided. However, today he was carrying some groceries and again had a similar episode where he noted 8/10 chest pain, sharp, again localized to the left shoulder. In the ED cardiac enzymes negativex1, EKG unchanged from prior (Incomplete LBBB). At the time of my visit he states his chest pain has resolved. He denies any recent SOB. No HA. No dizziness. No palpitations.    Allergies: NKDA    Home Medications:  ASA 81 mg po QD  Plavix 75 mg po QD  Crestor 10 mg po QD  Imdur 30 mg po QD  Metoprolol ER 50 mg po QD    PAST MEDICAL & SURGICAL HISTORY:  Non-ST elevation (NSTEMI) myocardial infarction  GERD without esophagitis  Coronary artery disease of native artery of native heart with stable angina pectoris  Hyperlipidemia, unspecified hyperlipidemia type  History of colonoscopy  Status post cataract extraction and insertion of intraocular lens, unspecified laterality  S/P angioplasty with stent: 2006 mCX 85% x1 LVEF 58% and 2009 - pRCA 85% HOLLY x 1    FH:   FAMILY HISTORY:  Family history of ischemic heart disease (Sibling): Brother @ 73 CABG and 68 with stents  Sister @58 with stents    SH: Never smoked. No ETOH. No IVDU  Works as a tech    ICU Vital Signs Last 24 Hrs  T(C): 36.5 (16 Oct 2017 23:53), Max: 36.6 (16 Oct 2017 21:59)  T(F): 97.7 (16 Oct 2017 23:53), Max: 97.8 (16 Oct 2017 21:59)  HR: 62 (16 Oct 2017 23:53) (62 - 68)  BP: 146/75 (16 Oct 2017 23:53) (146/75 - 181/71)  BP(mean): --  ABP: --  ABP(mean): --  RR: 17 (16 Oct 2017 23:53) (17 - 18)  SpO2: 99% (16 Oct 2017 23:53) (98% - 99%)    PE:   GEN: A+Ox3, NAD  HEENT: NCAT, PERRLA  CV: RRR, S1,S2, no M/R/G  Lungs: CTABL  ABD: BS+ S/NT/ND  Ext: No LE edema  Skin: No rashes  Neuro: CN2-12 intact    REVIEW OF SYSTEMS:    CONSTITUTIONAL: No weakness, fevers or chills  EYES/ENT: No visual changes;  No vertigo or throat pain   NECK: No pain or stiffness  RESPIRATORY: No cough, wheezing, hemoptysis; No shortness of breath  CARDIOVASCULAR: +chest pain or palpitations  GASTROINTESTINAL: No abdominal or epigastric pain. No nausea, vomiting, or hematemesis; No diarrhea or constipation. No melena or hematochezia.  GENITOURINARY: No dysuria, frequency or hematuria  NEUROLOGICAL: No numbness or weakness  SKIN: No itching, burning, rashes, or lesions   All other review of systems is negative unless indicated above.    Labs:                          14.6   7.8   )-----------( 165      ( 16 Oct 2017 22:34 )             43.1     10-16    140  |  103  |  13  ----------------------------<  119<H>  3.9   |  25  |  0.70    Ca    9.5      16 Oct 2017 22:34    TPro  7.4  /  Alb  4.4  /  TBili  0.5  /  DBili  x   /  AST  19  /  ALT  15  /  AlkPhos  57  10-16    Troponin T, Serum (10.16.17 @ 22:34)    Troponin T, Serum: <0.01: Reference Interval for Troponin T    EKG: 10/16/2017:  Incomplete LBBB, overall pattern unchanged when compared with prior EKG 09/14/2017    A/P: Cardiology Attending Consult Note:    CC: Chest Pain    HPI:     67 y.o M w/ +Family history of CAD (Brother CABG@73, Brother multiple PCI @68, Sister with PCI @58), HL, CAD s/p MI s/p multiple PCI (HOLLY to mLCX 2006, HOLLY to pRCA 2009), most recently s/p HOLLY to pLAD 09/14/2017 for intermittent chest pain on exertion. Since his most recent stent placement on 09/14/2017 he reports several episodes of exertional chest pain, the first occurring 4 days after HOLLY placement. He states the chest pain was sharp, 7/10, localized to his upper left chest area, non-radiating, and lasted for 5-7 minutes. These episodes typically are relieved with rest. He was started on Imdur 30 gm po daily by his PMD, after which the episodes subsided. However, today around 8PM he was carrying some groceries and again had a similar episode where he noted 8/10 chest pain, sharp, again localized to the left shoulder. In the ED cardiac enzymes negativex1, EKG unchanged from prior (Incomplete LBBB). At the time of my visit he states his chest pain has resolved. He denies any recent SOB. No HA. No dizziness. No palpitations.    Allergies: NKDA    Home Medications:  ASA 81 mg po QD  Plavix 75 mg po QD  Crestor 10 mg po QD  Imdur 30 mg po QD  Metoprolol ER 50 mg po QD    PAST MEDICAL & SURGICAL HISTORY:  Non-ST elevation (NSTEMI) myocardial infarction  GERD without esophagitis  Coronary artery disease of native artery of native heart with stable angina pectoris  Hyperlipidemia, unspecified hyperlipidemia type  History of colonoscopy  Status post cataract extraction and insertion of intraocular lens, unspecified laterality  S/P angioplasty with stent: 2006 mCX 85% x1 LVEF 58% and 2009 - pRCA 85% HOLLY x 1    FH:   FAMILY HISTORY:  Family history of ischemic heart disease (Sibling): Brother @ 73 CABG and 68 with stents  Sister @58 with stents    SH: Never smoked. No ETOH. No IVDU  Works as a tech    ICU Vital Signs Last 24 Hrs  T(C): 36.5 (16 Oct 2017 23:53), Max: 36.6 (16 Oct 2017 21:59)  T(F): 97.7 (16 Oct 2017 23:53), Max: 97.8 (16 Oct 2017 21:59)  HR: 62 (16 Oct 2017 23:53) (62 - 68)  BP: 146/75 (16 Oct 2017 23:53) (146/75 - 181/71)  BP(mean): --  ABP: --  ABP(mean): --  RR: 17 (16 Oct 2017 23:53) (17 - 18)  SpO2: 99% (16 Oct 2017 23:53) (98% - 99%)    PE:   GEN: A+Ox3, NAD  HEENT: NCAT, PERRLA  CV: RRR, S1,S2, no M/R/G  Lungs: CTABL  ABD: BS+ S/NT/ND  Ext: No LE edema  Skin: No rashes  Neuro: CN2-12 intact    REVIEW OF SYSTEMS:    CONSTITUTIONAL: No weakness, fevers or chills  EYES/ENT: No visual changes;  No vertigo or throat pain   NECK: No pain or stiffness  RESPIRATORY: No cough, wheezing, hemoptysis; No shortness of breath  CARDIOVASCULAR: +chest pain or palpitations  GASTROINTESTINAL: No abdominal or epigastric pain. No nausea, vomiting, or hematemesis; No diarrhea or constipation. No melena or hematochezia.  GENITOURINARY: No dysuria, frequency or hematuria  NEUROLOGICAL: No numbness or weakness  SKIN: No itching, burning, rashes, or lesions   All other review of systems is negative unless indicated above.    Labs:                          14.6   7.8   )-----------( 165      ( 16 Oct 2017 22:34 )             43.1     10-16    140  |  103  |  13  ----------------------------<  119<H>  3.9   |  25  |  0.70    Ca    9.5      16 Oct 2017 22:34    TPro  7.4  /  Alb  4.4  /  TBili  0.5  /  DBili  x   /  AST  19  /  ALT  15  /  AlkPhos  57  10-16    Troponin T, Serum (10.16.17 @ 22:34)    Troponin T, Serum: <0.01: Reference Interval for Troponin T    CKMB: 3.1  EKG: 10/16/2017:  Incomplete LBBB, overall pattern unchanged when compared with prior EKG 09/14/2017 09/14/2017: [facility]  CORONARY VESSELS: The coronary circulation is right dominant.  LM:   --  LM: Normal.  LAD:   --  Proximal LAD: There was a 80 % stenosis.  CX:   --  Circumflex: Normal.  RCA:   --  Proximal RCA: There was a 40 % stenosis.  -HOLLY placed to pLAD    A/P: 67 y.o M w/ +Family history of CAD (Brother CABG@73, Brother multiple PCI @68, Sister with PCI @58), HL, CAD s/p MI s/p multiple PCI (HOLLY to mLCX 2006, HOLLY to pRCA 2009), most recently s/p HOLLY to pLAD 09/14/2017 with Dr. Pearson for intermittent chest pain on exertion, now presents with chest pain.    1. Chest pain - Given the course of his chest pain over the last month on exertion and improvement with rest, suspect stable angina.   -CAD s/p MI s/p multiple PCI (HOLLY to mLCX 2006, HOLLY to pRCA 2009), most recently s/p HOLLY to pLAD 09/14/2017. Less likely represents subacute stent thrombosis (he is adamant that he takes ASA/Plavix daily), and symptoms are consistent over the last month.  -Would rule out with serial cardiac enzymes x 2, -Serial EKG  -If 2nd set of cardiac enzymes are negative, please order Exercise Stress MPI to help identify potential targets for revascularization  -Please keep NPO for AMexercise Stress MPI.  -Cont Imdur 30 mg po QD  -Cont ASA 81 mg po QD  -Cont Plavix 75 mg po QD  -Cont Metoprolol ER 50 gm po QD    2. HL  -Cont Crestor 10 mg po QD    3. HTN - /71 on admission, now 146/75.  -Cont to monitor suspect pain related.     -Will notify Dr. Pearson  -My team will continue to follow.    Joesph Sanderson MD  Cardiology Attending  Cell: [phone]

## 2017-10-16 NOTE — ED ADULT NURSE NOTE - OBJECTIVE STATEMENT
Pt comes in with resolved chest pain form earlier at 2030 tonight.  Pt was picking up a package form the car and left chest shoulder and arm tingling noted  Pt has hx of stents and IVL placed and bloods done and EKG unchanged form previous [facility]

## 2017-10-16 NOTE — ED PROVIDER NOTE - OBJECTIVE STATEMENT
66 yo M with strong FH of CAD (Brother CABG @73; Brother Stents @ 68;  Sister Stents @58) with h/o HLD, CAD s/p MI s/p PCI HOLLY (mCx in 2006, pRCA in 2009, and and 2009 -pRCA 85% HOLLY x 1, GERD 68 yo M with strong FH of CAD (Brother CABG @73; Brother Stents @ 68;  Sister Stents @58) with h/o HLD, CAD s/p MI s/p HOLLY x 3 (mCx in 2006, pRCA in 2009, and pLAD 9/14/17 for 80% stenosis, on ASA and Plavix), and GERD p/w 3 episodes of sharp CP on minimal exertion (walking several steps). CP is localized to the upper part of his L chest, nonradiating, and subsides with rest. Episodes last about 5-10 mins. No associated symptoms, including SOB, palpitations, diaphoresis, F/C, or N/V. Had URI symptoms, including productive cough, about 3 weeks ago. Denies any pleuritic CP. States that he never had this type of CP prior to the most recent stent placement. Pt reports that earlier today, as he was walking from the driveway to his house carrying a single shopping bag, he had severe sharp CP, prompting him to go to the ED. States that he has been compliant with his ASA and Plavix since the most recent stent placement. No trauma or injuries to the area, sick contacts, recent travel, or prolonged immobilization.

## 2017-10-16 NOTE — ED PROVIDER NOTE - MEDICAL DECISION MAKING DETAILS
Pt with CAD multiple stents last being 4 weeks ago has developed chest pain with specific amount of walking that he did in past without discomfort No rest chest pain heart and lungs exam wnl EKG LBBB morphology unchanged cardiology eval CDU obs  --Craft

## 2017-10-17 ENCOUNTER — TRANSCRIPTION ENCOUNTER (OUTPATIENT)
Age: 67
End: 2017-10-17

## 2017-10-17 DIAGNOSIS — Z29.9 ENCOUNTER FOR PROPHYLACTIC MEASURES, UNSPECIFIED: ICD-10-CM

## 2017-10-17 DIAGNOSIS — I20.8 OTHER FORMS OF ANGINA PECTORIS: ICD-10-CM

## 2017-10-17 DIAGNOSIS — E78.2 MIXED HYPERLIPIDEMIA: ICD-10-CM

## 2017-10-17 DIAGNOSIS — R07.9 CHEST PAIN, UNSPECIFIED: ICD-10-CM

## 2017-10-17 DIAGNOSIS — I10 ESSENTIAL (PRIMARY) HYPERTENSION: ICD-10-CM

## 2017-10-17 DIAGNOSIS — K21.9 GASTRO-ESOPHAGEAL REFLUX DISEASE WITHOUT ESOPHAGITIS: ICD-10-CM

## 2017-10-17 DIAGNOSIS — E78.5 HYPERLIPIDEMIA, UNSPECIFIED: ICD-10-CM

## 2017-10-17 DIAGNOSIS — I25.118 ATHEROSCLEROTIC HEART DISEASE OF NATIVE CORONARY ARTERY WITH OTHER FORMS OF ANGINA PECTORIS: ICD-10-CM

## 2017-10-17 LAB
CHOLEST SERPL-MCNC: 108 MG/DL — SIGNIFICANT CHANGE UP (ref 10–199)
HBA1C BLD-MCNC: 6.1 % — HIGH (ref 4–5.6)
HDLC SERPL-MCNC: 50 MG/DL — SIGNIFICANT CHANGE UP (ref 40–125)
LIPID PNL WITH DIRECT LDL SERPL: 40 MG/DL — SIGNIFICANT CHANGE UP
TOTAL CHOLESTEROL/HDL RATIO MEASUREMENT: 2.2 RATIO — LOW (ref 3.4–9.6)
TRIGL SERPL-MCNC: 90 MG/DL — SIGNIFICANT CHANGE UP (ref 10–149)
TROPONIN T SERPL-MCNC: <0.01 NG/ML — SIGNIFICANT CHANGE UP (ref 0–0.06)

## 2017-10-17 PROCEDURE — 99223 1ST HOSP IP/OBS HIGH 75: CPT

## 2017-10-17 PROCEDURE — 99236 HOSP IP/OBS SAME DATE HI 85: CPT | Mod: 25

## 2017-10-17 PROCEDURE — 93454 CORONARY ARTERY ANGIO S&I: CPT | Mod: 26,59

## 2017-10-17 PROCEDURE — 92928 PRQ TCAT PLMT NTRAC ST 1 LES: CPT | Mod: RI

## 2017-10-17 PROCEDURE — 99152 MOD SED SAME PHYS/QHP 5/>YRS: CPT

## 2017-10-17 PROCEDURE — 93010 ELECTROCARDIOGRAM REPORT: CPT

## 2017-10-17 PROCEDURE — 93010 ELECTROCARDIOGRAM REPORT: CPT | Mod: 77

## 2017-10-17 PROCEDURE — 99233 SBSQ HOSP IP/OBS HIGH 50: CPT

## 2017-10-17 PROCEDURE — 93018 CV STRESS TEST I&R ONLY: CPT

## 2017-10-17 PROCEDURE — 78452 HT MUSCLE IMAGE SPECT MULT: CPT | Mod: 26

## 2017-10-17 PROCEDURE — 93016 CV STRESS TEST SUPVJ ONLY: CPT

## 2017-10-17 RX ORDER — ASPIRIN/CALCIUM CARB/MAGNESIUM 324 MG
1 TABLET ORAL
Qty: 30 | Refills: 11 | OUTPATIENT
Start: 2017-10-17 | End: 2018-10-11

## 2017-10-17 RX ORDER — ASPIRIN/CALCIUM CARB/MAGNESIUM 324 MG
81 TABLET ORAL DAILY
Qty: 0 | Refills: 0 | Status: DISCONTINUED | OUTPATIENT
Start: 2017-10-17 | End: 2017-10-17

## 2017-10-17 RX ORDER — ISOSORBIDE MONONITRATE 60 MG/1
30 TABLET, EXTENDED RELEASE ORAL DAILY
Qty: 0 | Refills: 0 | Status: DISCONTINUED | OUTPATIENT
Start: 2017-10-17 | End: 2017-10-18

## 2017-10-17 RX ORDER — METOPROLOL TARTRATE 50 MG
50 TABLET ORAL DAILY
Qty: 0 | Refills: 0 | Status: DISCONTINUED | OUTPATIENT
Start: 2017-10-17 | End: 2017-10-17

## 2017-10-17 RX ORDER — CLOPIDOGREL BISULFATE 75 MG/1
75 TABLET, FILM COATED ORAL DAILY
Qty: 0 | Refills: 0 | Status: DISCONTINUED | OUTPATIENT
Start: 2017-10-17 | End: 2017-10-17

## 2017-10-17 RX ORDER — CLOPIDOGREL BISULFATE 75 MG/1
75 TABLET, FILM COATED ORAL DAILY
Qty: 0 | Refills: 0 | Status: DISCONTINUED | OUTPATIENT
Start: 2017-10-17 | End: 2017-10-18

## 2017-10-17 RX ORDER — ASPIRIN/CALCIUM CARB/MAGNESIUM 324 MG
81 TABLET ORAL DAILY
Qty: 0 | Refills: 0 | Status: DISCONTINUED | OUTPATIENT
Start: 2017-10-17 | End: 2017-10-18

## 2017-10-17 RX ORDER — METOPROLOL TARTRATE 50 MG
50 TABLET ORAL DAILY
Qty: 0 | Refills: 0 | Status: DISCONTINUED | OUTPATIENT
Start: 2017-10-17 | End: 2017-10-18

## 2017-10-17 RX ORDER — ROSUVASTATIN CALCIUM 5 MG/1
1 TABLET ORAL
Qty: 0 | Refills: 0 | COMMUNITY

## 2017-10-17 RX ORDER — ASPIRIN/CALCIUM CARB/MAGNESIUM 324 MG
243 TABLET ORAL ONCE
Qty: 0 | Refills: 0 | Status: COMPLETED | OUTPATIENT
Start: 2017-10-17 | End: 2017-10-17

## 2017-10-17 RX ORDER — INFLUENZA VIRUS VACCINE 15; 15; 15; 15 UG/.5ML; UG/.5ML; UG/.5ML; UG/.5ML
0.5 SUSPENSION INTRAMUSCULAR ONCE
Qty: 0 | Refills: 0 | Status: COMPLETED | OUTPATIENT
Start: 2017-10-17 | End: 2017-10-18

## 2017-10-17 RX ORDER — ATORVASTATIN CALCIUM 80 MG/1
40 TABLET, FILM COATED ORAL DAILY
Qty: 0 | Refills: 0 | Status: DISCONTINUED | OUTPATIENT
Start: 2017-10-17 | End: 2017-10-18

## 2017-10-17 RX ORDER — CLOPIDOGREL BISULFATE 75 MG/1
1 TABLET, FILM COATED ORAL
Qty: 30 | Refills: 11 | OUTPATIENT
Start: 2017-10-17 | End: 2018-10-11

## 2017-10-17 RX ORDER — SODIUM CHLORIDE 9 MG/ML
3 INJECTION INTRAMUSCULAR; INTRAVENOUS; SUBCUTANEOUS EVERY 12 HOURS
Qty: 0 | Refills: 0 | Status: DISCONTINUED | OUTPATIENT
Start: 2017-10-17 | End: 2017-10-18

## 2017-10-17 RX ADMIN — Medication 243 MILLIGRAM(S): at 00:55

## 2017-10-17 RX ADMIN — SODIUM CHLORIDE 3 MILLILITER(S): 9 INJECTION INTRAMUSCULAR; INTRAVENOUS; SUBCUTANEOUS at 06:30

## 2017-10-17 NOTE — ED CDU PROVIDER NOTE - PLAN OF CARE
1. Stay hydrated.  2. Continue Current Home Medications  3. Follow up with your Cardiologist Dr. Borrego and your Interventionalist Dr. Pearson in 1-2 days (Bring printed results to your doctor visit).  4. Return if symptoms, worsen, fever, weakness, chest pain, difficulty breathing, dizziness and all other concerns.

## 2017-10-17 NOTE — DISCHARGE NOTE ADULT - MEDICATION SUMMARY - MEDICATIONS TO TAKE
I will START or STAY ON the medications listed below when I get home from the hospital:    aspirin 81 mg oral delayed release tablet  -- 1 tab(s) by mouth once a day  -- Indication: For To keep stent patent     isosorbide mononitrate 30 mg oral tablet, extended release  -- 1 tab(s) by mouth once a day (in the morning)  -- Indication: For Hypertension     rosuvastatin 5 mg oral tablet  -- 2 tab(s) by mouth once a day (at bedtime)  -- Indication: For Hyperlipidemia     clopidogrel 75 mg oral tablet  -- 1 tab(s) by mouth once a day  -- Indication: For To keep stent patent     metoprolol succinate 50 mg oral tablet, extended release  -- 1 tab(s) by mouth once a day  -- Indication: For Hypertension

## 2017-10-17 NOTE — H&P ADULT - NSHPLABSRESULTS_GEN_ALL_CORE
LABS:                         14.6   7.8   )-----------( 165      ( 16 Oct 2017 22:34 )             43.1     10-16    140  |  103  |  13  ----------------------------<  119<H>  3.9   |  25  |  0.70    Ca    9.5      16 Oct 2017 22:34    TPro  7.4  /  Alb  4.4  /  TBili  0.5  /  DBili  x   /  AST  19  /  ALT  15  /  AlkPhos  57  10-16    PT/INR - ( 16 Oct 2017 22:34 )   PT: 11.8 sec;   INR: 1.08 ratio         PTT - ( 16 Oct 2017 22:34 )  PTT:31.4 sec    CARDIAC MARKERS ( 17 Oct 2017 04:53 )  x     / <0.01 ng/mL / x     / x     / x      CARDIAC MARKERS ( 16 Oct 2017 22:34 )  x     / <0.01 ng/mL / 83 U/L / x     / 3.1 ng/mL      Labs reviewed remarkable for trop neg x 2, a1c 6.1.  EKG personally reviewed sinus bradycardia hr 58, qtc 424, LBBB morphology, TWI II, AVF.  CXR personally reviewed no infiltrate, no effusion.  Stress test +mild to moderate defect of inferolateralseptal region.

## 2017-10-17 NOTE — DISCHARGE NOTE ADULT - HOSPITAL COURSE
67 y.o M w/ +Family history of CAD (Brother CABG@73, Brother multiple PCI @68, Sister with PCI @58), HL, CAD s/p MI s/p multiple PCI (HOLLY to mLCX 2006, HOLLY to pRCA 2009), most recently s/p HOLLY to pLAD 09/14/2017 for intermittent chest pain on exertion. Since his most recent stent placement on 09/14/2017 he reports several episodes of exertional chest pain, the first occurring 4 days after HOLLY placement. He states the chest pain was sharp, 7/10, localized to his upper left chest area, non-radiating, and lasted for 5-7 minutes. These episodes typically are relieved with rest. He was started on Imdur 30 gm po daily by his PMD, after which the episodes subsided. However, today around 8PM he was carrying some groceries and again had a similar episode where he noted 8/10 chest pain, sharp, again localized to the left shoulder. In the ED cardiac enzymes negativex1, EKG unchanged from prior (Incomplete LBBB). At the time of my visit he states his chest pain has resolved. He denies any recent SOB. No HA. No dizziness. No palpitations. Now s/p cardiac cath HOLLY x 2 ramus via right radial.

## 2017-10-17 NOTE — DISCHARGE NOTE ADULT - PATIENT PORTAL LINK FT
“You can access the Follow[employer] Patient Portal, offered by [facility], by registering with the following website: [email or web address]”

## 2017-10-17 NOTE — H&P ADULT - ASSESSMENT
66 y/o M PMH of CAD s/p MI with HOLLY x 3 (mCx 2006, pRCA 2009, pLAD 9/14/17) on asa/plavix, HLD, GERD presents for angina, found +stress test adm for scheduled LHC.

## 2017-10-17 NOTE — ED ADULT NURSE REASSESSMENT NOTE - ANCILLARY STATUS
cardiovascular tests pending
cardiovascular tests pending/awaiting Nuc Stress/EKG at bedside/awaiting lab draw

## 2017-10-17 NOTE — ED ADULT NURSE REASSESSMENT NOTE - NS ED NURSE REASSESS COMMENT FT1
Report received from Jennifer TONEY, VS repeated. Patient resting comfortably, denies chest pain/SOB/pain. Pending results
report taken from Grace TONEY
Pt received from DAWNA Salazar. Pt oriented to CDU & plan of care was discussed. Pt denies any chest pain, SOB, dizziness or palpitations. Safety & comfort measures maintained. Call bell in reach. Will continue to monitor.

## 2017-10-17 NOTE — H&P ADULT - NSHPPHYSICALEXAM_GEN_ALL_CORE
Vital Signs Last 24 Hrs  T(C): 36.7 (17 Oct 2017 18:04), Max: 36.8 (17 Oct 2017 16:40)  T(F): 98.1 (17 Oct 2017 18:04), Max: 98.3 (17 Oct 2017 16:40)  HR: 63 (17 Oct 2017 18:04) (57 - 73)  BP: 163/77 (17 Oct 2017 18:04) (124/76 - 181/71)  BP(mean): 105 (17 Oct 2017 18:04) (105 - 105)  RR: 16 (17 Oct 2017 18:04) (16 - 18)  SpO2: 99% (17 Oct 2017 18:04) (98% - 100%)    PHYSICAL EXAM:  GENERAL:  Well appearing, in NAD  HEAD:  NCAT  EYES: PERRLA, conjunctiva clear  NECK: Supple, No JVD  CHEST/LUNG: No reproducible chest wall tenderness. CTA B/L. No w/r/r.  HEART: RRR. Normal S1, S2. No m/r/g.   ABDOMEN: SNTND. Bowel sounds present  EXTREMITIES:  2+ Peripheral Pulses, No clubbing, cyanosis, edema.  PSYCH: AAOx3, appropriate affect  SKIN: No rashes or lesions

## 2017-10-17 NOTE — H&P ADULT - NSHPREVIEWOFSYSTEMS_GEN_ALL_CORE
Review of Systems:   CONSTITUTIONAL: No fever, weight loss  EYES: No eye pain, visual disturbances, or discharge  ENMT:  No difficulty hearing, tinnitus, vertigo; No sinus or throat pain  RESPIRATORY: No SOB. No cough, wheezing, chills or hemoptysis  CARDIOVASCULAR: +CHEST PAIN. No palpitations, dizziness, or leg swelling  GASTROINTESTINAL: No abdominal or epigastric pain. No nausea, vomiting, or hematemesis; No diarrhea or constipation. No melena or hematochezia.  GENITOURINARY: No dysuria, frequency, hematuria, or incontinence  NEUROLOGICAL: No headaches, memory loss, loss of strength, numbness, or tremors  SKIN: No itching, burning, rashes, or lesions   LYMPH NODES: No enlarged glands  ENDOCRINE: No heat or cold intolerance; No hair loss  MUSCULOSKELETAL: No joint pain or swelling; No muscle, back pain  PSYCHIATRIC: No depression, anxiety, mood swings, or difficulty sleeping  HEME/LYMPH: No easy bruising, or bleeding gums

## 2017-10-17 NOTE — ED CDU PROVIDER NOTE - NS ED MD DISPO ADMIT NSHS
Reason for Call:  Patient is calling in states that he needs orders placed and to have an infusion of remikade as insurance no longer covers [facility]     Detailed comments: see above    Phone Number Patient can be reached at: Home number on file [phone] (home)    Best Time: any    Can we leave a detailed message on this number? YES    Call taken on 1/5/2017 at 8:33 AM by Malu Cabrera     MEDICINE

## 2017-10-17 NOTE — ED CDU PROVIDER NOTE - ATTENDING CONTRIBUTION TO CARE
I have seen and evaluated this patient with the [facility] practice clinician.   I agree with the findings  unless other wise stated. I have amended notes where needed.  After my face to face bedside evaluation, I am noting: Pt with CAD multiple stents last being 4 weeks ago has developed chest pain with specific amount of walking that he did in past without discomfort No rest chest pain heart and lungs exam wnl EKG LBBB morphology unchanged cardiology eval CDU obs  --Craft

## 2017-10-17 NOTE — H&P ADULT - PROBLEM SELECTOR PLAN 1
c/o ep of angina, found positive stress test +mild-mod defect inferolateralseptal wall, with EKG showing TWI of inferior leads.   - seen by cardiology - for LHC today  - NPO for procedure  - s/p total ASA load ~325 mg po.

## 2017-10-17 NOTE — ED CDU PROVIDER NOTE - OBJECTIVE STATEMENT
68 yo M with strong FH of CAD (Brother CABG @73; Brother Stents @ 68;  Sister Stents @58) with h/o HLD, CAD s/p MI s/p HOLLY x 3 (mCx in 2006, pRCA in 2009, and pLAD 9/14/17 for 80% stenosis, on ASA and Plavix), and GERD p/w 3 episodes of sharp CP on minimal exertion (walking several steps). CP is localized to the upper part of his L chest, nonradiating, and subsides with rest. Episodes last about 5-10 mins. No associated symptoms, including SOB, palpitations, diaphoresis, F/C, or N/V. Had URI symptoms, including productive cough, about 3 weeks ago. Denies any pleuritic CP. States that he never had this type of CP prior to the most recent stent placement. Pt reports that earlier today, as he was walking from the driveway to his house carrying a single shopping bag, he had severe sharp CP, prompting him to go to the ED. States that he has been compliant with his ASA and Plavix since the most recent stent placement. No trauma or injuries to the area, sick contacts, recent travel, or prolonged immobilization. 68 yo M with strong FH of CAD (Brother CABG @73; Brother Stents @ 68;  Sister Stents @58) with h/o HLD, CAD s/p MI s/p HOLLY x 3 (mCx in 2006, pRCA in 2009, and pLAD 9/14/17 for 80% stenosis, on ASA and Plavix), and GERD p/w 3 episodes of sharp L sided CP with exertion over the past month with most recent episode today.  CP is localized to the upper part of his L chest, nonradiating, and subsides with rest. Episodes last about 5-10 mins. Pt states that the first episode started just a few days after stent placement and patient instructed to rest.  Pt then about 2 wks ago walked about 1/2 block and developed this same L sided chest pain.  Then today lifted bag of grocery and walked inside his house and developed pain again. Subsided after rest. Today was more severe episode. pt also had soreness to his Left shoulder after but resolved.  this pain is different from pain he experienced that required stenting.  pt now feels well. pt reports is active and has walked many times in between these episodes without pain.   No associated symptoms, including SOB, palpitations, diaphoresis, F/C, or N/V. Had URI symptoms, including productive cough, about 3 weeks ago. Denies any pleuritic CP. States that he never had this type of CP prior to the most recent stent placement. States that he has been compliant with his ASA and Plavix since the most recent stent placement. No trauma or injuries to the area, sick contacts, recent travel, or prolonged immobilization.

## 2017-10-17 NOTE — DISCHARGE NOTE ADULT - ADDITIONAL INSTRUCTIONS
Follow-up with your Cardiologist in 1-2 weeks. Follow-up with your Cardiologist in 1-2 weeks.  No heavy lifting or pushing/pulling with procedure arm for 2 weeks. No driving for 2 days. You may shower 24 hours following the procedure but avoid baths/swimming for 1 week. Check your wrist site for bleeding and/or swelling daily following procedure and call your doctor immediately if it occurs or if you experience increased pain at the site. Follow up with your cardiologist in 1-2 weeks. You may call [facility] if you have any questions/concerns regarding your procedure [phone].

## 2017-10-17 NOTE — DISCHARGE NOTE ADULT - CARE PROVIDER_API CALL
Romario Pearson), Cardiovascular Disease; Interventional Cardiology  [address] [place] [address]  Phone: [phone]  Fax: [phone]

## 2017-10-17 NOTE — DISCHARGE NOTE ADULT - CONDITION (STATED IN TERMS THAT PERMIT A SPECIFIC MEASURABLE COMPARISON WITH CONDITION ON ADMISSION):
at time of discharge patient denies chest pain, palpitations, edema, SOB. right radial site is WDL, no evidence of hematoma, swelling or bleeding. Patient ambulated today tolerated well.

## 2017-10-17 NOTE — DISCHARGE NOTE ADULT - PLAN OF CARE
Pt remains chest pain free and understands post cath discharge instructions No heavy lifting or pushing/pulling with procedure arm for 2 weeks. No driving for 2 days. You may shower 24 hours following the procedure but avoid baths/swimming for 1 week. Check your wrist site for bleeding and/or swelling daily following procedure and call your doctor immediately if it occurs or if you experience increased pain at the site. Follow up with your cardiologist in 1-2 weeks. You may call [facility] if you have any questions/concerns regarding your procedure [phone]. Your blood pressure will be controlled. Continue with your blood pressure medications; eat a heart healthy diet with low salt diet; exercise regularly (consult with your physician or cardiologist first); maintain a heart healthy weight; if you smoke - quit (A resource to help you stop smoking is the [facility] – phone number [phone].); include healthy ways to manage stress. Continue to follow with your primary care physician or cardiologist. Your LDL cholesterol will be less than 70mg/dL Continue with your cholesterol medications. Eat a heart healthy diet that is low in saturated fats and salt, and includes whole grains, fruits, vegetables and lean protein; exercise regularly (consult with your physician or cardiologist first); maintain a heart healthy weight. Continue to follow with your primary physician or cardiologist for treatment goals, continue medication, have liver function testing every 3 months as anti lipid medications can cause liver irritation. If you smoke - quit (A resource to help you stop smoking is the [facility] – phone number [phone].).

## 2017-10-17 NOTE — ED CDU PROVIDER NOTE - PROGRESS NOTE DETAILS
CDU NOTE PARAMJIT Torres: pt resting comfortably, feels well without complaint. NAD VSS. no events on tele. CDU NOTE PARAMJIT Torres: pt asleep. NAD. no events on tele. CDU NOTE PARAMJIT ANDREA: Pt resting comfortably, feeling well without complaint. denies any CP. NAD, VSS. No events on telemetry. CV: S1S2 RRR, Lungs: CTA b/l. Pt going for stress test today. pt at stress lab. CDU NOTE PARAMJIT ANDREA: Pt resting comfortably, feeling well without complaint. denies any CP. NAD, VSS. No events on telemetry. spoke with stress lab cardiologist, pt has abnormal stress test, informed covering cardiologist, Dr. Garcia, for Dr. Pearson. Dr. Garcia will review stress test and see pt, likely will need admission. cardio saw pt again, pt to be admitted for cath. pt informed. case and plan discussed with Dr. Shrestha; agrees with admission. Attending MD Shrestha:  I have personally performed a face to face diagnostic evaluation on this patient.  I have reviewed the ACP note and agree with the history, exam, and plan of care, except as noted.  History and Exam by me shows a 68 yo male with PMH for CAD, PTCA with stents who presented to ED with chest pain.  Patient has no pain a present and cardiac enzymes negative at this time.  Patient went for nuclear stress which returned abnormal and patient to be admitted for cardiac cath.  Plan discussed with patient and family and he is in agreement.

## 2017-10-17 NOTE — DISCHARGE NOTE ADULT - CARE PLAN
Principal Discharge DX:	Coronary artery disease of native artery of native heart with stable angina pectoris  Goal:	Pt remains chest pain free and understands post cath discharge instructions  Instructions for follow-up, activity and diet:	No heavy lifting or pushing/pulling with procedure arm for 2 weeks. No driving for 2 days. You may shower 24 hours following the procedure but avoid baths/swimming for 1 week. Check your wrist site for bleeding and/or swelling daily following procedure and call your doctor immediately if it occurs or if you experience increased pain at the site. Follow up with your cardiologist in 1-2 weeks. You may call [facility] if you have any questions/concerns regarding your procedure [phone].  Secondary Diagnosis:	HTN (hypertension)  Goal:	Your blood pressure will be controlled.  Instructions for follow-up, activity and diet:	Continue with your blood pressure medications; eat a heart healthy diet with low salt diet; exercise regularly (consult with your physician or cardiologist first); maintain a heart healthy weight; if you smoke - quit (A resource to help you stop smoking is the [facility] [employer] – phone number [phone].); include healthy ways to manage stress. Continue to follow with your primary care physician or cardiologist.  Secondary Diagnosis:	Hyperlipidemia  Goal:	Your LDL cholesterol will be less than 70mg/dL  Instructions for follow-up, activity and diet:	Continue with your cholesterol medications. Eat a heart healthy diet that is low in saturated fats and salt, and includes whole grains, fruits, vegetables and lean protein; exercise regularly (consult with your physician or cardiologist first); maintain a heart healthy weight. Continue to follow with your primary physician or cardiologist for treatment goals, continue medication, have liver function testing every 3 months as anti lipid medications can cause liver irritation. If you smoke - quit (A resource to help you stop smoking is the [facility] [employer] – phone number [phone].).

## 2017-10-17 NOTE — PROGRESS NOTE ADULT - PROBLEM SELECTOR PLAN 1
Stable angina, CCS class III  Pharmacological stress test abnormal correlating with Left circumflex territory.   Angiogram films reviewed with Dr. Romario Pearson and plan is to proceed with [facility] for further evaluation of extent of CAD. Stable angina, CCS class III  Pharmacological stress test abnormal correlating with Left circumflex territory.   Angiogram films reviewed with Dr. Romario Pearson and plan is to proceed with [facility] for further evaluation of extent of existing CAD.

## 2017-10-17 NOTE — H&P ADULT - PMH
Coronary artery disease of native artery of native heart with stable angina pectoris    GERD without esophagitis    H/O heart artery stent    HTN (hypertension)    Hyperlipidemia, unspecified hyperlipidemia type    Non-ST elevation (NSTEMI) myocardial infarction

## 2017-10-17 NOTE — H&P CARDIOLOGY - HISTORY OF PRESENT ILLNESS
This is  a 66 y/o male with strong FH of CAD (Brother CABG @73; Brother Stents @ 68 and sister Stents @58) with PMH of HLD, HTN, CAD, MI, cardiac stents (  PCI/HOLLY 2006 mCX 85% x1 LVEF 58%,   2009 -pRCA 85% HOLLY x 1, most recent pLAD stent SEpt 14/2017 with Md Pearson), GERD.  Presents to [facility] ED on 10/17 with c/c of x3 episodes of sharp L sided CP with exertion over the past month ( s/p stent placement in September), with most recent episode today.  CP is localized to the upper part of his L chest, non radiating, and subsides with rest. Episodes last about 5-10 mins. Pt states that the first episode started just a few days after stent placement and patient instructed to rest.  Pt then about 2 wks ago walked about 1/2 block and developed this same L sided chest pain.  Then today lifted bag of grocery and walked inside his house and developed pain again. Subsided after rest. Today was more severe episode. pt also had soreness to his Left shoulder after but resolved.  this pain is different from pain he experienced that required stenting.  pt now feels well. pt reports is active and has walked many times in between these episodes without pain.   No associated symptoms, including SOB, palpitations, diaphoresis, F/C, or N/V. Had URI symptoms, including productive cough, about 3 weeks ago. Denies any pleuritic CP. States that he never had this type of CP prior to the most recent stent placement. States that he has been compliant with his ASA and Plavix since the most recent stent placement. No trauma or injuries to the area, sick contacts, recent travel, or prolonged immobilization.  Troponin neg X2, however had abnormal nuclear stress test today  ( see report below).  Sent to cardiac cath lab now for cardiac cath with possible intervention.  Presently pt is asymptomatic, denies chest pain.        STRESS TEST   IMPRESSIONS:Abnormal Study  * Chest Pain: No chest pain with administration of  Regadenoson.  * Symptom: Warm sensation, flushing, abdominal discomfort.  * HR Response: Appropriate.  * BP Response: Appropriate.  * Heart Rhythm: Sinus Rhythm - 66 BPM.  * Conduction defects: Left bundle branch block, Left axis  deviation.  * ECG Abnormalities: ECG changes could not be interpreted  due to bundle branch block.  * Arrhythmia: None.  * The left ventricle was normal in size. There are  medium-sized, mild to moderate defects in the mid to  distal inferolateral and apical lateral walls that are  mostly reversible suggestive of ischemia with partial  scarring.  * There are large, mild to moderate defects in the  inferior, inferoseptal, and inferoapical walls that are  mostly fixed suggestive of infarct with mild rl-infarct  ischemia.  * Post-stress gated wall motion analysis was performed  (LVEF = 56 %;LVEDV = 107 ml.) revealing reduced systolic  thickening of the inferior, inferoapical, apical lateral,  distal inferolateral, and basal septal walls with  preserved overall left ventricular ejection fraction.     cardiac cath 9/14/2017  CORONARY VESSELS: The coronary circulation is right dominant.  LM:   --  LM: Normal.  LAD:   --  Proximal LAD: There was a 80 % stenosis.  CX:   --  Circumflex: Normal.  RCA:   --  Proximal RCA: There was a 40 % stenosis.  COMPLICATIONS: There were no complications.  INTERVENTIONAL RECOMMENDATIONS: ASA and Plavix for 1 year  Prepared and signed by  Romario Pearson M.D.

## 2017-10-17 NOTE — ED CDU PROVIDER NOTE - CHPI ED SYMPTOMS NEG
no dizziness/no vomiting/no shortness of breath/no nausea/no back pain/no diaphoresis/no syncope/no fever

## 2017-10-18 VITALS
SYSTOLIC BLOOD PRESSURE: 132 MMHG | OXYGEN SATURATION: 96 % | DIASTOLIC BLOOD PRESSURE: 69 MMHG | HEART RATE: 70 BPM | RESPIRATION RATE: 17 BRPM

## 2017-10-18 LAB
ANION GAP SERPL CALC-SCNC: 12 MMOL/L — SIGNIFICANT CHANGE UP (ref 5–17)
BUN SERPL-MCNC: 14 MG/DL — SIGNIFICANT CHANGE UP (ref 7–23)
CALCIUM SERPL-MCNC: 9.3 MG/DL — SIGNIFICANT CHANGE UP (ref 8.4–10.5)
CHLORIDE SERPL-SCNC: 102 MMOL/L — SIGNIFICANT CHANGE UP (ref 96–108)
CO2 SERPL-SCNC: 26 MMOL/L — SIGNIFICANT CHANGE UP (ref 22–31)
CREAT SERPL-MCNC: 0.73 MG/DL — SIGNIFICANT CHANGE UP (ref 0.5–1.3)
GLUCOSE SERPL-MCNC: 127 MG/DL — HIGH (ref 70–99)
HCT VFR BLD CALC: 43.2 % — SIGNIFICANT CHANGE UP (ref 39–50)
HGB BLD-MCNC: 15 G/DL — SIGNIFICANT CHANGE UP (ref 13–17)
MCHC RBC-ENTMCNC: 30.7 PG — SIGNIFICANT CHANGE UP (ref 27–34)
MCHC RBC-ENTMCNC: 34.7 GM/DL — SIGNIFICANT CHANGE UP (ref 32–36)
MCV RBC AUTO: 88.5 FL — SIGNIFICANT CHANGE UP (ref 80–100)
PLATELET # BLD AUTO: 183 K/UL — SIGNIFICANT CHANGE UP (ref 150–400)
POTASSIUM SERPL-MCNC: 3.9 MMOL/L — SIGNIFICANT CHANGE UP (ref 3.5–5.3)
POTASSIUM SERPL-SCNC: 3.9 MMOL/L — SIGNIFICANT CHANGE UP (ref 3.5–5.3)
RBC # BLD: 4.89 M/UL — SIGNIFICANT CHANGE UP (ref 4.2–5.8)
RBC # FLD: 12.2 % — SIGNIFICANT CHANGE UP (ref 10.3–14.5)
SODIUM SERPL-SCNC: 140 MMOL/L — SIGNIFICANT CHANGE UP (ref 135–145)
WBC # BLD: 7.3 K/UL — SIGNIFICANT CHANGE UP (ref 3.8–10.5)
WBC # FLD AUTO: 7.3 K/UL — SIGNIFICANT CHANGE UP (ref 3.8–10.5)

## 2017-10-18 PROCEDURE — 99153 MOD SED SAME PHYS/QHP EA: CPT

## 2017-10-18 PROCEDURE — 99285 EMERGENCY DEPT VISIT HI MDM: CPT

## 2017-10-18 PROCEDURE — 82435 ASSAY OF BLOOD CHLORIDE: CPT

## 2017-10-18 PROCEDURE — C1769: CPT

## 2017-10-18 PROCEDURE — 90686 IIV4 VACC NO PRSV 0.5 ML IM: CPT

## 2017-10-18 PROCEDURE — 82947 ASSAY GLUCOSE BLOOD QUANT: CPT

## 2017-10-18 PROCEDURE — 83605 ASSAY OF LACTIC ACID: CPT

## 2017-10-18 PROCEDURE — 99152 MOD SED SAME PHYS/QHP 5/>YRS: CPT

## 2017-10-18 PROCEDURE — 80061 LIPID PANEL: CPT

## 2017-10-18 PROCEDURE — 85610 PROTHROMBIN TIME: CPT

## 2017-10-18 PROCEDURE — C1887: CPT

## 2017-10-18 PROCEDURE — 78452 HT MUSCLE IMAGE SPECT MULT: CPT

## 2017-10-18 PROCEDURE — A9500: CPT

## 2017-10-18 PROCEDURE — 80048 BASIC METABOLIC PNL TOTAL CA: CPT

## 2017-10-18 PROCEDURE — 82550 ASSAY OF CK (CPK): CPT

## 2017-10-18 PROCEDURE — 84295 ASSAY OF SERUM SODIUM: CPT

## 2017-10-18 PROCEDURE — 93005 ELECTROCARDIOGRAM TRACING: CPT | Mod: 76

## 2017-10-18 PROCEDURE — 85730 THROMBOPLASTIN TIME PARTIAL: CPT

## 2017-10-18 PROCEDURE — 84132 ASSAY OF SERUM POTASSIUM: CPT

## 2017-10-18 PROCEDURE — C1894: CPT

## 2017-10-18 PROCEDURE — 82565 ASSAY OF CREATININE: CPT

## 2017-10-18 PROCEDURE — 71045 X-RAY EXAM CHEST 1 VIEW: CPT

## 2017-10-18 PROCEDURE — 85027 COMPLETE CBC AUTOMATED: CPT

## 2017-10-18 PROCEDURE — 93010 ELECTROCARDIOGRAM REPORT: CPT

## 2017-10-18 PROCEDURE — G0378: CPT

## 2017-10-18 PROCEDURE — 93454 CORONARY ARTERY ANGIO S&I: CPT | Mod: 59

## 2017-10-18 PROCEDURE — 82553 CREATINE MB FRACTION: CPT

## 2017-10-18 PROCEDURE — C1725: CPT

## 2017-10-18 PROCEDURE — 85014 HEMATOCRIT: CPT

## 2017-10-18 PROCEDURE — 80053 COMPREHEN METABOLIC PANEL: CPT

## 2017-10-18 PROCEDURE — 82330 ASSAY OF CALCIUM: CPT

## 2017-10-18 PROCEDURE — 93017 CV STRESS TEST TRACING ONLY: CPT

## 2017-10-18 PROCEDURE — 82803 BLOOD GASES ANY COMBINATION: CPT

## 2017-10-18 PROCEDURE — 99239 HOSP IP/OBS DSCHRG MGMT >30: CPT

## 2017-10-18 PROCEDURE — 83036 HEMOGLOBIN GLYCOSYLATED A1C: CPT

## 2017-10-18 PROCEDURE — C9600: CPT | Mod: RI

## 2017-10-18 PROCEDURE — 84484 ASSAY OF TROPONIN QUANT: CPT

## 2017-10-18 PROCEDURE — C1874: CPT

## 2017-10-18 RX ADMIN — CLOPIDOGREL BISULFATE 75 MILLIGRAM(S): 75 TABLET, FILM COATED ORAL at 05:23

## 2017-10-18 RX ADMIN — ISOSORBIDE MONONITRATE 30 MILLIGRAM(S): 60 TABLET, EXTENDED RELEASE ORAL at 09:58

## 2017-10-18 RX ADMIN — Medication 50 MILLIGRAM(S): at 05:23

## 2017-10-18 RX ADMIN — INFLUENZA VIRUS VACCINE 0.5 MILLILITER(S): 15; 15; 15; 15 SUSPENSION INTRAMUSCULAR at 09:53

## 2017-10-18 RX ADMIN — Medication 81 MILLIGRAM(S): at 05:23

## 2017-10-18 RX ADMIN — SODIUM CHLORIDE 3 MILLILITER(S): 9 INJECTION INTRAMUSCULAR; INTRAVENOUS; SUBCUTANEOUS at 05:25

## 2017-10-18 NOTE — PROGRESS NOTE ADULT - PROBLEM SELECTOR PROBLEM 3
Coronary artery disease of native artery of native heart with stable angina pectoris
Prophylactic measure

## 2017-10-18 NOTE — PROGRESS NOTE ADULT - SUBJECTIVE AND OBJECTIVE BOX
PROGRESS NOTE  Reason for follow up: stable angina  Overnight: No new events.   Update: scheduled for [facility] today    Subjective: "I don't have pain"   Complains of: none  Review of symptoms: Cardiac:  No chest pain. No dyspnea. No palpitations.  Respiratory:no cough. No dyspnea  Gastrointestinal: No diarrhea. No abdominal pain. No bleeding.     Past medical history: No updates.   Chronic conditions:  Hypertension: controlled. CAD: Stable ischemic heart disease.  	  Vitals:  T(C): 36.8 (10-17-17 @ 16:40), Max: 36.8 (10-17-17 @ 16:40)  HR: 73 (10-17-17 @ 16:40) (57 - 73)  BP: 158/79 (10-17-17 @ 16:40) (124/76 - 181/71)  RR: 16 (10-17-17 @ 16:40) (16 - 18)  SpO2: 99% (10-17-17 @ 16:40) (98% - 100%)  Wt(kg): --  I&O's Summary        PHYSICAL EXAM:  Appearance: Comfortable. No acute distress  HEENT:  Head and neck: Atraumatic. Normocephalic.  Normal oral mucosa, PERRL, Neck is supple. No JVD, No carotid bruit.   Neurologic: A & O x 3, no focal deficits. EOMI   Lymphatic: No cervical lymphadenopathy  Cardiovascular: Normal S1 S2, No murmur, rubs/gallops. No JVD, No edema  Respiratory: Lungs clear to auscultation  Gastrointestinal:  Soft, Non-tender, + BS  Lower Extremities: No edema  Psychiatry: Patient is calm. No agitation. Mood & affect appropriate  Skin: No rashes/ ecchymoses/cyanosis/ulcers visualized on the face, hands or feet.    CURRENT MEDICATIONS:  MEDICATIONS  (STANDING):  aspirin enteric coated 81 milliGRAM(s) Oral daily  atorvastatin 40 milliGRAM(s) Oral daily  clopidogrel Tablet 75 milliGRAM(s) Oral daily  sodium chloride 0.9% lock flush 3 milliLiter(s) IV Push every 12 hours    MEDICATIONS  (PRN):  None    LABS:	 	  CARDIAC MARKERS ( 17 Oct 2017 04:53 )  x     / <0.01 ng/mL / x     / x     / x      p-BNP 17 Oct 2017 04:53: x    , CARDIAC MARKERS ( 16 Oct 2017 22:34 )  x     / <0.01 ng/mL / 83 U/L / x     / 3.1 ng/mL  p-BNP 16 Oct 2017 22:34: x                                14.6   7.8   )-----------( 165      ( 16 Oct 2017 22:34 )             43.1     10-16    140  |  103  |  13  ----------------------------<  119<H>  3.9   |  25  |  0.70    Ca    9.5      16 Oct 2017 22:34    TPro  7.4  /  Alb  4.4  /  TBili  0.5  /  DBili  x   /  AST  19  /  ALT  15  /  AlkPhos  57  10-16    proBNP:   Lipid Profile: Date: 10-17 @ 09:09  Total cholesterol 108; Direct LDL: 40; HDL: 50; Triglycerides:90    HgA1c: Hemoglobin A1C, Whole Blood: 6.1 %  TSH:     TELEMETRY: Reviewed, no events  ECG:  Reviewed by me, sinus LBBB    DIAGNOSTIC TESTING:  [ ]  Catheterization:  < from: Cardiac Cath Lab - Adult (09.14.17 @ 14:23) >  CORONARY VESSELS: The coronary circulation is right dominant.  LM:   --  LM: Normal.  LAD:   --  Proximal LAD: There was a 80 % stenosis.  CX:   --  Circumflex: Normal.  RCA:   --  Proximal RCA: There was a 40 % stenosis.    [ ] Stress Test:  < from: Nuclear Stress Test-Pharmacologic (10.17.17 @ 13:10) >  The left ventricle was normal in size. There are  medium-sized, mild to moderate defects in the mid to  distal inferolateral and apical lateral walls that are  mostly reversible suggestive of ischemia with partial  scarring.  There are large, mild to moderate defects in the inferior,  inferoseptal, and inferoapical walls that are mostly fixed  suggestive of infarct with mild rl-infarct ischemia.
Patient is a 67y old  Male who presents with a chief complaint of chest pain (17 Oct 2017 22:30)        SUBJECTIVE / OVERNIGHT EVENTS:  No pain overnight. no acute issues or complaints.       MEDICATIONS  (STANDING):  aspirin enteric coated 81 milliGRAM(s) Oral daily  atorvastatin 40 milliGRAM(s) Oral daily  clopidogrel Tablet 75 milliGRAM(s) Oral daily  isosorbide   mononitrate ER Tablet (IMDUR) 30 milliGRAM(s) Oral daily  metoprolol succinate ER 50 milliGRAM(s) Oral daily  sodium chloride 0.9% lock flush 3 milliLiter(s) IV Push every 12 hours    MEDICATIONS  (PRN):      Vital Signs Last 24 Hrs  T(C): 36.7 (18 Oct 2017 04:00), Max: 36.9 (17 Oct 2017 21:30)  T(F): 98 (18 Oct 2017 04:00), Max: 98.4 (17 Oct 2017 21:30)  HR: 70 (18 Oct 2017 09:56) (55 - 73)  BP: 132/69 (18 Oct 2017 09:56) (98/62 - 163/77)  BP(mean): 105 (17 Oct 2017 18:04) (105 - 105)  RR: 17 (18 Oct 2017 09:56) (16 - 18)  SpO2: 96% (18 Oct 2017 09:56) (95% - 100%)  CAPILLARY BLOOD GLUCOSE        I&O's Summary    17 Oct 2017 07:01  -  18 Oct 2017 07:00  --------------------------------------------------------  IN: 120 mL / OUT: 0 mL / NET: 120 mL    18 Oct 2017 07:01  -  18 Oct 2017 11:04  --------------------------------------------------------  IN: 240 mL / OUT: 0 mL / NET: 240 mL          PHYSICAL EXAM  GENERAL: NAD, well-developed  HEAD:  Atraumatic, Normocephalic  EYES: EOMI, PERRLA, conjunctiva and sclera clear  NECK: Supple, No JVD  CHEST/LUNG: Clear to auscultation bilaterally; No wheeze  HEART: Regular rate and rhythm; No murmurs, rubs, or gallops  ABDOMEN: Soft, Nontender, Nondistended; Bowel sounds present  EXTREMITIES:  Right radial cath site without signs of hematoma. 2+ Peripheral Pulses, No clubbing, cyanosis, or edema  PSYCH: AAOx3  SKIN: No rashes or lesions    LABS:                        15.0   7.3   )-----------( 183      ( 18 Oct 2017 04:57 )             43.2     10-18    140  |  102  |  14  ----------------------------<  127<H>  3.9   |  26  |  0.73    Ca    9.3      18 Oct 2017 04:57    TPro  7.4  /  Alb  4.4  /  TBili  0.5  /  DBili  x   /  AST  19  /  ALT  15  /  AlkPhos  57  10-16    PT/INR - ( 16 Oct 2017 22:34 )   PT: 11.8 sec;   INR: 1.08 ratio         PTT - ( 16 Oct 2017 22:34 )  PTT:31.4 sec  CARDIAC MARKERS ( 17 Oct 2017 04:53 )  x     / <0.01 ng/mL / x     / x     / x      CARDIAC MARKERS ( 16 Oct 2017 22:34 )  x     / <0.01 ng/mL / 83 U/L / x     / 3.1 ng/mL            RADIOLOGY & ADDITIONAL TESTS:    Imaging Personally Reviewed:  Consultant(s) Notes Reviewed:  card  Care Discussed with Consultants/Other Providers:

## 2017-10-18 NOTE — PROGRESS NOTE ADULT - ASSESSMENT
66 y/o M PMH of CAD s/p MI with HOLLY x 3 (mCx 2006, pRCA 2009, pLAD 9/14/17) on asa/plavix, HLD, GERD presents for angina, found +stress test adm for scheduled LHC.
67 y.o M w/ +Family history of CAD (Brother CABG@73, Brother multiple PCI @68, Sister with PCI @58), HL, CAD s/p MI s/p multiple PCI (HOLLY to mLCX 2006, HOLLY to pRCA 2009), most recently s/p HOLLY to pLAD 09/14/2017 with Dr. Pearson for intermittent chest pain on exertion, now presents with chest pain.

## 2017-10-18 NOTE — CHART NOTE - NSCHARTNOTEFT_GEN_A_CORE
Removal of Radial Band    Pulses in the right upper extremity are palpable. The patient was placed in the supine position. The insertion site was identified and the band deflated per protocol. The radial band was removed slowly. Direct pressure was applied for  5  minutes.  Monitoring of the right wrists and both upper extremities including neuro-vascular checks and vital signs every 15 minutes x 4.  Complications: None. VSS. OOB at 2:00am if site stable; no hematoma or bleeding.     hSerly Reddy DNP, FNP-C

## 2017-10-18 NOTE — PROGRESS NOTE ADULT - ATTENDING COMMENTS
Thank you. My team will follow.    Jana Garcia M.D, F.A.C.C  [facility]   [phone]
Discharge home today with follow up  D/c time 40 mins

## 2017-10-18 NOTE — PROGRESS NOTE ADULT - PROBLEM SELECTOR PLAN 1
c/o ep of angina, found positive stress test +mild-mod defect inferolateralseptal wall, with EKG showing TWI of inferior leads.   -S/p cardiac cath with DESx2 to radial  - no complications.   -cont with ASA and plavix

## 2017-10-18 NOTE — PROGRESS NOTE ADULT - PROBLEM SELECTOR PLAN 3
- c/w asa, plavix, BB, lipitor 40 mg qd, isosorbide mononitrate 30 mg qd  - F/U cardiology
VTE prophylaxis: lovenox 40mg sq daily

## 2017-10-18 NOTE — PROGRESS NOTE ADULT - PROBLEM SELECTOR PROBLEM 1
Angina of effort
Coronary artery disease of native artery of native heart with stable angina pectoris

## 2017-10-26 ENCOUNTER — MEDICATION RENEWAL (OUTPATIENT)
Age: 67
End: 2017-10-26

## 2017-10-27 ENCOUNTER — APPOINTMENT (OUTPATIENT)
Dept: CARDIOLOGY | Facility: CLINIC | Age: 67
End: 2017-10-27
Payer: COMMERCIAL

## 2017-10-27 VITALS
WEIGHT: 147 LBS | HEIGHT: 63 IN | SYSTOLIC BLOOD PRESSURE: 163 MMHG | OXYGEN SATURATION: 98 % | BODY MASS INDEX: 26.05 KG/M2 | DIASTOLIC BLOOD PRESSURE: 73 MMHG | HEART RATE: 59 BPM

## 2017-10-27 PROCEDURE — 99214 OFFICE O/P EST MOD 30 MIN: CPT

## 2017-10-27 PROCEDURE — 93000 ELECTROCARDIOGRAM COMPLETE: CPT

## 2017-10-27 RX ORDER — PROMETHAZINE HYDROCHLORIDE 6.25 MG/5ML
6.25 SOLUTION ORAL TWICE DAILY
Qty: 140 | Refills: 0 | Status: DISCONTINUED | COMMUNITY
Start: 2017-09-21 | End: 2017-10-27

## 2017-10-27 RX ORDER — AZITHROMYCIN 500 MG/1
500 TABLET, FILM COATED ORAL DAILY
Qty: 5 | Refills: 0 | Status: DISCONTINUED | COMMUNITY
Start: 2017-09-21 | End: 2017-10-27

## 2017-11-06 ENCOUNTER — APPOINTMENT (OUTPATIENT)
Dept: CARDIOLOGY | Facility: CLINIC | Age: 67
End: 2017-11-06
Payer: COMMERCIAL

## 2017-11-06 VITALS
BODY MASS INDEX: 25.34 KG/M2 | HEIGHT: 63 IN | SYSTOLIC BLOOD PRESSURE: 128 MMHG | DIASTOLIC BLOOD PRESSURE: 63 MMHG | OXYGEN SATURATION: 99 % | WEIGHT: 143 LBS | HEART RATE: 67 BPM

## 2017-11-06 PROCEDURE — 99214 OFFICE O/P EST MOD 30 MIN: CPT | Mod: 25

## 2017-11-06 PROCEDURE — 93000 ELECTROCARDIOGRAM COMPLETE: CPT

## 2017-11-09 ENCOUNTER — NON-APPOINTMENT (OUTPATIENT)
Age: 67
End: 2017-11-09

## 2017-12-01 ENCOUNTER — APPOINTMENT (OUTPATIENT)
Dept: CARDIOLOGY | Facility: CLINIC | Age: 67
End: 2017-12-01
Payer: COMMERCIAL

## 2017-12-01 PROCEDURE — 93015 CV STRESS TEST SUPVJ I&R: CPT

## 2017-12-04 ENCOUNTER — RECORD ABSTRACTING (OUTPATIENT)
Age: 67
End: 2017-12-04

## 2017-12-04 ENCOUNTER — APPOINTMENT (OUTPATIENT)
Dept: CARDIOLOGY | Facility: CLINIC | Age: 67
End: 2017-12-04
Payer: COMMERCIAL

## 2017-12-04 VITALS
OXYGEN SATURATION: 98 % | DIASTOLIC BLOOD PRESSURE: 66 MMHG | HEART RATE: 73 BPM | WEIGHT: 141 LBS | SYSTOLIC BLOOD PRESSURE: 150 MMHG | HEIGHT: 63 IN | BODY MASS INDEX: 24.98 KG/M2

## 2017-12-04 VITALS
HEART RATE: 73 BPM | DIASTOLIC BLOOD PRESSURE: 82 MMHG | SYSTOLIC BLOOD PRESSURE: 140 MMHG | BODY MASS INDEX: 24.98 KG/M2 | WEIGHT: 141 LBS | RESPIRATION RATE: 16 BRPM

## 2017-12-04 PROCEDURE — 99214 OFFICE O/P EST MOD 30 MIN: CPT

## 2017-12-05 ENCOUNTER — APPOINTMENT (OUTPATIENT)
Dept: CARDIOLOGY | Facility: CLINIC | Age: 67
End: 2017-12-05
Payer: COMMERCIAL

## 2017-12-05 PROCEDURE — 93798 PHYS/QHP OP CAR RHAB W/ECG: CPT

## 2017-12-11 ENCOUNTER — APPOINTMENT (OUTPATIENT)
Dept: CARDIOLOGY | Facility: CLINIC | Age: 67
End: 2017-12-11
Payer: COMMERCIAL

## 2017-12-11 PROCEDURE — 93798 PHYS/QHP OP CAR RHAB W/ECG: CPT

## 2017-12-13 ENCOUNTER — APPOINTMENT (OUTPATIENT)
Dept: CARDIOLOGY | Facility: CLINIC | Age: 67
End: 2017-12-13
Payer: COMMERCIAL

## 2017-12-13 PROCEDURE — 93798 PHYS/QHP OP CAR RHAB W/ECG: CPT

## 2017-12-14 ENCOUNTER — APPOINTMENT (OUTPATIENT)
Dept: CARDIOLOGY | Facility: CLINIC | Age: 67
End: 2017-12-14
Payer: COMMERCIAL

## 2017-12-14 PROCEDURE — 93798 PHYS/QHP OP CAR RHAB W/ECG: CPT

## 2017-12-18 ENCOUNTER — APPOINTMENT (OUTPATIENT)
Dept: CARDIOLOGY | Facility: CLINIC | Age: 67
End: 2017-12-18
Payer: COMMERCIAL

## 2017-12-18 PROCEDURE — 93798 PHYS/QHP OP CAR RHAB W/ECG: CPT

## 2017-12-20 ENCOUNTER — APPOINTMENT (OUTPATIENT)
Dept: CARDIOLOGY | Facility: CLINIC | Age: 67
End: 2017-12-20
Payer: COMMERCIAL

## 2017-12-20 PROCEDURE — 93798 PHYS/QHP OP CAR RHAB W/ECG: CPT

## 2017-12-21 ENCOUNTER — RX RENEWAL (OUTPATIENT)
Age: 67
End: 2017-12-21

## 2017-12-21 ENCOUNTER — APPOINTMENT (OUTPATIENT)
Dept: CARDIOLOGY | Facility: CLINIC | Age: 67
End: 2017-12-21
Payer: COMMERCIAL

## 2017-12-21 PROCEDURE — 93798 PHYS/QHP OP CAR RHAB W/ECG: CPT

## 2017-12-27 ENCOUNTER — APPOINTMENT (OUTPATIENT)
Dept: CARDIOLOGY | Facility: CLINIC | Age: 67
End: 2017-12-27
Payer: COMMERCIAL

## 2017-12-27 PROCEDURE — 93798 PHYS/QHP OP CAR RHAB W/ECG: CPT

## 2017-12-28 ENCOUNTER — APPOINTMENT (OUTPATIENT)
Dept: CARDIOLOGY | Facility: CLINIC | Age: 67
End: 2017-12-28
Payer: COMMERCIAL

## 2017-12-28 PROCEDURE — 93798 PHYS/QHP OP CAR RHAB W/ECG: CPT

## 2018-01-03 ENCOUNTER — APPOINTMENT (OUTPATIENT)
Dept: CARDIOLOGY | Facility: CLINIC | Age: 68
End: 2018-01-03
Payer: COMMERCIAL

## 2018-01-03 PROCEDURE — 93798 PHYS/QHP OP CAR RHAB W/ECG: CPT

## 2018-01-08 ENCOUNTER — APPOINTMENT (OUTPATIENT)
Dept: CARDIOLOGY | Facility: CLINIC | Age: 68
End: 2018-01-08
Payer: COMMERCIAL

## 2018-01-08 PROCEDURE — 93798 PHYS/QHP OP CAR RHAB W/ECG: CPT

## 2018-01-10 ENCOUNTER — APPOINTMENT (OUTPATIENT)
Dept: CARDIOLOGY | Facility: CLINIC | Age: 68
End: 2018-01-10
Payer: COMMERCIAL

## 2018-01-10 PROCEDURE — 93798 PHYS/QHP OP CAR RHAB W/ECG: CPT

## 2018-01-11 ENCOUNTER — APPOINTMENT (OUTPATIENT)
Dept: CARDIOLOGY | Facility: CLINIC | Age: 68
End: 2018-01-11
Payer: COMMERCIAL

## 2018-01-11 ENCOUNTER — NON-APPOINTMENT (OUTPATIENT)
Age: 68
End: 2018-01-11

## 2018-01-11 VITALS
OXYGEN SATURATION: 98 % | BODY MASS INDEX: 24.8 KG/M2 | HEART RATE: 64 BPM | WEIGHT: 140 LBS | SYSTOLIC BLOOD PRESSURE: 153 MMHG | DIASTOLIC BLOOD PRESSURE: 71 MMHG

## 2018-01-11 PROCEDURE — 99214 OFFICE O/P EST MOD 30 MIN: CPT | Mod: 25

## 2018-01-11 PROCEDURE — 93000 ELECTROCARDIOGRAM COMPLETE: CPT

## 2018-01-11 PROCEDURE — 93798 PHYS/QHP OP CAR RHAB W/ECG: CPT

## 2018-01-17 ENCOUNTER — APPOINTMENT (OUTPATIENT)
Dept: CARDIOLOGY | Facility: CLINIC | Age: 68
End: 2018-01-17
Payer: COMMERCIAL

## 2018-01-17 PROCEDURE — 93798 PHYS/QHP OP CAR RHAB W/ECG: CPT

## 2018-01-18 ENCOUNTER — APPOINTMENT (OUTPATIENT)
Dept: CARDIOLOGY | Facility: CLINIC | Age: 68
End: 2018-01-18
Payer: COMMERCIAL

## 2018-01-18 PROCEDURE — 93798 PHYS/QHP OP CAR RHAB W/ECG: CPT

## 2018-01-22 ENCOUNTER — APPOINTMENT (OUTPATIENT)
Dept: CARDIOLOGY | Facility: CLINIC | Age: 68
End: 2018-01-22
Payer: COMMERCIAL

## 2018-01-22 PROCEDURE — 93798 PHYS/QHP OP CAR RHAB W/ECG: CPT

## 2018-01-24 ENCOUNTER — APPOINTMENT (OUTPATIENT)
Dept: CARDIOLOGY | Facility: CLINIC | Age: 68
End: 2018-01-24
Payer: COMMERCIAL

## 2018-01-24 PROCEDURE — 93798 PHYS/QHP OP CAR RHAB W/ECG: CPT

## 2018-01-25 ENCOUNTER — APPOINTMENT (OUTPATIENT)
Dept: CARDIOLOGY | Facility: CLINIC | Age: 68
End: 2018-01-25
Payer: COMMERCIAL

## 2018-01-25 ENCOUNTER — NON-APPOINTMENT (OUTPATIENT)
Age: 68
End: 2018-01-25

## 2018-01-25 VITALS — SYSTOLIC BLOOD PRESSURE: 145 MMHG | HEART RATE: 66 BPM | DIASTOLIC BLOOD PRESSURE: 66 MMHG | OXYGEN SATURATION: 97 %

## 2018-01-25 VITALS — WEIGHT: 140 LBS | HEIGHT: 63 IN | BODY MASS INDEX: 24.8 KG/M2

## 2018-01-25 PROCEDURE — 99214 OFFICE O/P EST MOD 30 MIN: CPT | Mod: 25

## 2018-01-25 PROCEDURE — 93000 ELECTROCARDIOGRAM COMPLETE: CPT

## 2018-02-01 ENCOUNTER — APPOINTMENT (OUTPATIENT)
Dept: CARDIOLOGY | Facility: CLINIC | Age: 68
End: 2018-02-01

## 2018-02-05 ENCOUNTER — APPOINTMENT (OUTPATIENT)
Dept: CARDIOLOGY | Facility: CLINIC | Age: 68
End: 2018-02-05
Payer: COMMERCIAL

## 2018-02-05 VITALS
HEIGHT: 63 IN | DIASTOLIC BLOOD PRESSURE: 67 MMHG | BODY MASS INDEX: 24.98 KG/M2 | OXYGEN SATURATION: 95 % | SYSTOLIC BLOOD PRESSURE: 154 MMHG | WEIGHT: 141 LBS | HEART RATE: 71 BPM

## 2018-02-05 PROCEDURE — 99214 OFFICE O/P EST MOD 30 MIN: CPT

## 2018-02-05 RX ORDER — ISOSORBIDE MONONITRATE 30 MG/1
30 TABLET, EXTENDED RELEASE ORAL DAILY
Qty: 30 | Refills: 2 | Status: DISCONTINUED | COMMUNITY

## 2018-02-07 ENCOUNTER — APPOINTMENT (OUTPATIENT)
Dept: CARDIOLOGY | Facility: CLINIC | Age: 68
End: 2018-02-07
Payer: COMMERCIAL

## 2018-02-07 PROCEDURE — 93798 PHYS/QHP OP CAR RHAB W/ECG: CPT

## 2018-02-12 ENCOUNTER — APPOINTMENT (OUTPATIENT)
Dept: CARDIOLOGY | Facility: CLINIC | Age: 68
End: 2018-02-12
Payer: COMMERCIAL

## 2018-02-12 ENCOUNTER — INPATIENT (INPATIENT)
Facility: HOSPITAL | Age: 68
LOS: 0 days | Discharge: HOME | DRG: 247 | End: 2018-02-13
Attending: INTERNAL MEDICINE | Admitting: INTERNAL MEDICINE
Payer: COMMERCIAL

## 2018-02-12 ENCOUNTER — NON-APPOINTMENT (OUTPATIENT)
Age: 68
End: 2018-02-12

## 2018-02-12 ENCOUNTER — TRANSCRIPTION ENCOUNTER (OUTPATIENT)
Age: 68
End: 2018-02-12

## 2018-02-12 VITALS
BODY MASS INDEX: 24.98 KG/M2 | OXYGEN SATURATION: 99 % | SYSTOLIC BLOOD PRESSURE: 153 MMHG | HEART RATE: 69 BPM | HEIGHT: 63 IN | DIASTOLIC BLOOD PRESSURE: 83 MMHG | WEIGHT: 141 LBS

## 2018-02-12 VITALS
WEIGHT: 141.1 LBS | RESPIRATION RATE: 18 BRPM | HEIGHT: 66 IN | DIASTOLIC BLOOD PRESSURE: 73 MMHG | HEART RATE: 61 BPM | OXYGEN SATURATION: 96 % | SYSTOLIC BLOOD PRESSURE: 150 MMHG | TEMPERATURE: 98 F

## 2018-02-12 DIAGNOSIS — I20.0 UNSTABLE ANGINA: ICD-10-CM

## 2018-02-12 DIAGNOSIS — Z95.9 PRESENCE OF CARDIAC AND VASCULAR IMPLANT AND GRAFT, UNSPECIFIED: Chronic | ICD-10-CM

## 2018-02-12 DIAGNOSIS — Z98.890 OTHER SPECIFIED POSTPROCEDURAL STATES: Chronic | ICD-10-CM

## 2018-02-12 DIAGNOSIS — Z98.49 CATARACT EXTRACTION STATUS, UNSPECIFIED EYE: Chronic | ICD-10-CM

## 2018-02-12 LAB
ALBUMIN SERPL ELPH-MCNC: 4.4 G/DL — SIGNIFICANT CHANGE UP (ref 3.3–5)
ALP SERPL-CCNC: 59 U/L — SIGNIFICANT CHANGE UP (ref 40–120)
ALT FLD-CCNC: 19 U/L RC — SIGNIFICANT CHANGE UP (ref 10–45)
ANION GAP SERPL CALC-SCNC: 14 MMOL/L — SIGNIFICANT CHANGE UP (ref 5–17)
AST SERPL-CCNC: 28 U/L — SIGNIFICANT CHANGE UP (ref 10–40)
BASOPHILS # BLD AUTO: 0 K/UL — SIGNIFICANT CHANGE UP (ref 0–0.2)
BASOPHILS NFR BLD AUTO: 0.5 % — SIGNIFICANT CHANGE UP (ref 0–2)
BILIRUB SERPL-MCNC: 0.6 MG/DL — SIGNIFICANT CHANGE UP (ref 0.2–1.2)
BUN SERPL-MCNC: 9 MG/DL — SIGNIFICANT CHANGE UP (ref 7–23)
CALCIUM SERPL-MCNC: 9.7 MG/DL — SIGNIFICANT CHANGE UP (ref 8.4–10.5)
CHLORIDE SERPL-SCNC: 104 MMOL/L — SIGNIFICANT CHANGE UP (ref 96–108)
CO2 SERPL-SCNC: 24 MMOL/L — SIGNIFICANT CHANGE UP (ref 22–31)
CREAT SERPL-MCNC: 0.69 MG/DL — SIGNIFICANT CHANGE UP (ref 0.5–1.3)
EOSINOPHIL # BLD AUTO: 0.4 K/UL — SIGNIFICANT CHANGE UP (ref 0–0.5)
EOSINOPHIL NFR BLD AUTO: 5.1 % — SIGNIFICANT CHANGE UP (ref 0–6)
GLUCOSE SERPL-MCNC: 117 MG/DL — HIGH (ref 70–99)
HCT VFR BLD CALC: 46.1 % — SIGNIFICANT CHANGE UP (ref 39–50)
HGB BLD-MCNC: 15.3 G/DL — SIGNIFICANT CHANGE UP (ref 13–17)
LYMPHOCYTES # BLD AUTO: 2.3 K/UL — SIGNIFICANT CHANGE UP (ref 1–3.3)
LYMPHOCYTES # BLD AUTO: 29.1 % — SIGNIFICANT CHANGE UP (ref 13–44)
MCHC RBC-ENTMCNC: 29.1 PG — SIGNIFICANT CHANGE UP (ref 27–34)
MCHC RBC-ENTMCNC: 33.2 GM/DL — SIGNIFICANT CHANGE UP (ref 32–36)
MCV RBC AUTO: 87.6 FL — SIGNIFICANT CHANGE UP (ref 80–100)
MONOCYTES # BLD AUTO: 0.7 K/UL — SIGNIFICANT CHANGE UP (ref 0–0.9)
MONOCYTES NFR BLD AUTO: 8.7 % — SIGNIFICANT CHANGE UP (ref 2–14)
NEUTROPHILS # BLD AUTO: 4.5 K/UL — SIGNIFICANT CHANGE UP (ref 1.8–7.4)
NEUTROPHILS NFR BLD AUTO: 56.6 % — SIGNIFICANT CHANGE UP (ref 43–77)
PLATELET # BLD AUTO: 188 K/UL — SIGNIFICANT CHANGE UP (ref 150–400)
POTASSIUM SERPL-MCNC: 4.4 MMOL/L — SIGNIFICANT CHANGE UP (ref 3.5–5.3)
POTASSIUM SERPL-SCNC: 4.4 MMOL/L — SIGNIFICANT CHANGE UP (ref 3.5–5.3)
PROT SERPL-MCNC: 8.1 G/DL — SIGNIFICANT CHANGE UP (ref 6–8.3)
RBC # BLD: 5.26 M/UL — SIGNIFICANT CHANGE UP (ref 4.2–5.8)
RBC # FLD: 12.4 % — SIGNIFICANT CHANGE UP (ref 10.3–14.5)
SODIUM SERPL-SCNC: 142 MMOL/L — SIGNIFICANT CHANGE UP (ref 135–145)
TROPONIN T SERPL-MCNC: 0.08 NG/ML — HIGH (ref 0–0.06)
WBC # BLD: 7.9 K/UL — SIGNIFICANT CHANGE UP (ref 3.8–10.5)
WBC # FLD AUTO: 7.9 K/UL — SIGNIFICANT CHANGE UP (ref 3.8–10.5)

## 2018-02-12 PROCEDURE — 93010 ELECTROCARDIOGRAM REPORT: CPT

## 2018-02-12 PROCEDURE — 99152 MOD SED SAME PHYS/QHP 5/>YRS: CPT

## 2018-02-12 PROCEDURE — 93000 ELECTROCARDIOGRAM COMPLETE: CPT

## 2018-02-12 PROCEDURE — 99215 OFFICE O/P EST HI 40 MIN: CPT

## 2018-02-12 PROCEDURE — 71046 X-RAY EXAM CHEST 2 VIEWS: CPT | Mod: 26

## 2018-02-12 PROCEDURE — 93458 L HRT ARTERY/VENTRICLE ANGIO: CPT | Mod: 26,59

## 2018-02-12 PROCEDURE — 92941 PRQ TRLML REVSC TOT OCCL AMI: CPT | Mod: RI

## 2018-02-12 PROCEDURE — 99285 EMERGENCY DEPT VISIT HI MDM: CPT | Mod: 25

## 2018-02-12 RX ORDER — METOPROLOL TARTRATE 50 MG
50 TABLET ORAL DAILY
Qty: 0 | Refills: 0 | Status: DISCONTINUED | OUTPATIENT
Start: 2018-02-12 | End: 2018-02-13

## 2018-02-12 RX ORDER — SODIUM CHLORIDE 9 MG/ML
3 INJECTION INTRAMUSCULAR; INTRAVENOUS; SUBCUTANEOUS ONCE
Qty: 0 | Refills: 0 | Status: COMPLETED | OUTPATIENT
Start: 2018-02-12 | End: 2018-02-12

## 2018-02-12 RX ORDER — ASPIRIN/CALCIUM CARB/MAGNESIUM 324 MG
81 TABLET ORAL DAILY
Qty: 0 | Refills: 0 | Status: DISCONTINUED | OUTPATIENT
Start: 2018-02-12 | End: 2018-02-13

## 2018-02-12 RX ORDER — ROSUVASTATIN CALCIUM 5 MG/1
2 TABLET ORAL
Qty: 0 | Refills: 0 | COMMUNITY

## 2018-02-12 RX ORDER — ASPIRIN ENTERIC COATED TABLETS 81 MG 81 MG/1
81 TABLET, DELAYED RELEASE ORAL
Qty: 30 | Refills: 0 | Status: DISCONTINUED | COMMUNITY
Start: 2018-01-19

## 2018-02-12 RX ORDER — ISOSORBIDE MONONITRATE 60 MG/1
30 TABLET, EXTENDED RELEASE ORAL DAILY
Qty: 0 | Refills: 0 | Status: DISCONTINUED | OUTPATIENT
Start: 2018-02-12 | End: 2018-02-13

## 2018-02-12 RX ORDER — ATORVASTATIN CALCIUM 80 MG/1
40 TABLET, FILM COATED ORAL AT BEDTIME
Qty: 0 | Refills: 0 | Status: DISCONTINUED | OUTPATIENT
Start: 2018-02-12 | End: 2018-02-13

## 2018-02-12 RX ORDER — CLOPIDOGREL BISULFATE 75 MG/1
75 TABLET, FILM COATED ORAL DAILY
Qty: 0 | Refills: 0 | Status: DISCONTINUED | OUTPATIENT
Start: 2018-02-12 | End: 2018-02-13

## 2018-02-12 RX ORDER — ASPIRIN/CALCIUM CARB/MAGNESIUM 324 MG
324 TABLET ORAL ONCE
Qty: 0 | Refills: 0 | Status: COMPLETED | OUTPATIENT
Start: 2018-02-12 | End: 2018-02-12

## 2018-02-12 RX ADMIN — ATORVASTATIN CALCIUM 40 MILLIGRAM(S): 80 TABLET, FILM COATED ORAL at 21:18

## 2018-02-12 RX ADMIN — Medication 50 MILLIGRAM(S): at 15:42

## 2018-02-12 RX ADMIN — Medication 324 MILLIGRAM(S): at 11:21

## 2018-02-12 RX ADMIN — ISOSORBIDE MONONITRATE 30 MILLIGRAM(S): 60 TABLET, EXTENDED RELEASE ORAL at 15:42

## 2018-02-12 RX ADMIN — SODIUM CHLORIDE 3 MILLILITER(S): 9 INJECTION INTRAMUSCULAR; INTRAVENOUS; SUBCUTANEOUS at 11:00

## 2018-02-12 NOTE — H&P CARDIOLOGY - EKG AND INTERPRETATION
NSR @67  LAD, RBBB  St depression in anterolateral leads with early repolarization same as baseline ECG 10/17

## 2018-02-12 NOTE — ED ADULT NURSE REASSESSMENT NOTE - NS ED NURSE REASSESS COMMENT FT1
blood work shows positive troponin, pt denies chest pain or discomfort, arm or back pain, difficulty breathing, N/V. Cath lab contacted via MD Tolentino, pt to be transported now to cath lab. Report called to cath lab RN; pt on portable monitor, belongings with family. Name band on, IV intact. Chart at bedside

## 2018-02-12 NOTE — ED PROVIDER NOTE - OBJECTIVE STATEMENT
68y male  PMH of HLD, HTN, CAD, MI, ptca w stents GERD. No habits DM, Pt comes to ed complains of two weeks of abnormal ekg on cardiotherapy. Last stent 10/2017. Sent for cath. No current cp. pt has complains of chest pressure with running on treadmill. No nvdc. fever and chills. shortness of breath, cough. +flu/pna vaccine.

## 2018-02-12 NOTE — ED PROVIDER NOTE - PROGRESS NOTE DETAILS
Discussed with Cath lab RN TBA DR Romario Tolentino MD, Facep Discussed with Cards Fellow  Luis Tolentino MD, Facep Trop positive 0.08, Discussed with cath lab , sending for pt.  Luis Tolentino MD, Facep

## 2018-02-12 NOTE — H&P CARDIOLOGY - HISTORY OF PRESENT ILLNESS
67 y/o Male with pmh of HTN, HLD, CAD s/p MI with HOLLY x 4 (mCx 2006, pRCA 2009, pLAD 9/14/17, RI 80% x 1 10/17/17) on asa/plavix, GERD seen by Dr. Pearson, Cardiologist this am for followup for his cardiotherapy with reports of chest pressure while on the treadmill after 15minutes at 3.4 miles/hr with an incline and when he was cycling last week was the last occurrence that stopped after 4 minutes of rest.  Pt states having ECG changes at the time.  This was reported today to Dr. Pearson and he was sent to the ER with CXR normal and Trop elevated 0.08.  Pt presents for cardiac cath and denies cp, sob or palpitations presently and was given 4 baby asa in the ER took his plavix last night and is on Metoprolol and Imdur as his antianginal regimen.      Of note: Pt has dry cough with PND and no symptoms of fevers, chills or leukocytosis by labs with clear lungs by CXR.

## 2018-02-12 NOTE — ED ADULT NURSE NOTE - OBJECTIVE STATEMENT
67 y/o male a+ox3, in ED today for admission to cath lab 67 y/o male a+ox3, in ED today for admission to angiogram s/p stress test at PMD office. Pt told his EKG was "abnormal" while pt "on treadmill" and c/o chest pressure. Pmhx stents x2, DM and HTN. Pt currently denies chest pain or discomfort, difficulty breathing, cough, back or arm pain, N/V. VS documented, IV established, labs obtained. Admission processed and pt on telebox for transfer. Family at bedside, will reassess.

## 2018-02-13 ENCOUNTER — RX RENEWAL (OUTPATIENT)
Age: 68
End: 2018-02-13

## 2018-02-13 VITALS
SYSTOLIC BLOOD PRESSURE: 108 MMHG | TEMPERATURE: 98 F | HEART RATE: 65 BPM | RESPIRATION RATE: 17 BRPM | OXYGEN SATURATION: 98 % | DIASTOLIC BLOOD PRESSURE: 57 MMHG

## 2018-02-13 LAB
ANION GAP SERPL CALC-SCNC: 13 MMOL/L — SIGNIFICANT CHANGE UP (ref 5–17)
BUN SERPL-MCNC: 15 MG/DL — SIGNIFICANT CHANGE UP (ref 7–23)
CALCIUM SERPL-MCNC: 9.4 MG/DL — SIGNIFICANT CHANGE UP (ref 8.4–10.5)
CHLORIDE SERPL-SCNC: 105 MMOL/L — SIGNIFICANT CHANGE UP (ref 96–108)
CO2 SERPL-SCNC: 23 MMOL/L — SIGNIFICANT CHANGE UP (ref 22–31)
CREAT SERPL-MCNC: 0.76 MG/DL — SIGNIFICANT CHANGE UP (ref 0.5–1.3)
GLUCOSE SERPL-MCNC: 109 MG/DL — HIGH (ref 70–99)
HCT VFR BLD CALC: 42.1 % — SIGNIFICANT CHANGE UP (ref 39–50)
HGB BLD-MCNC: 14.4 G/DL — SIGNIFICANT CHANGE UP (ref 13–17)
MCHC RBC-ENTMCNC: 30 PG — SIGNIFICANT CHANGE UP (ref 27–34)
MCHC RBC-ENTMCNC: 34.3 GM/DL — SIGNIFICANT CHANGE UP (ref 32–36)
MCV RBC AUTO: 87.4 FL — SIGNIFICANT CHANGE UP (ref 80–100)
PLATELET # BLD AUTO: 168 K/UL — SIGNIFICANT CHANGE UP (ref 150–400)
POTASSIUM SERPL-MCNC: 4 MMOL/L — SIGNIFICANT CHANGE UP (ref 3.5–5.3)
POTASSIUM SERPL-SCNC: 4 MMOL/L — SIGNIFICANT CHANGE UP (ref 3.5–5.3)
RBC # BLD: 4.81 M/UL — SIGNIFICANT CHANGE UP (ref 4.2–5.8)
RBC # FLD: 12.3 % — SIGNIFICANT CHANGE UP (ref 10.3–14.5)
SODIUM SERPL-SCNC: 141 MMOL/L — SIGNIFICANT CHANGE UP (ref 135–145)
WBC # BLD: 8.5 K/UL — SIGNIFICANT CHANGE UP (ref 3.8–10.5)
WBC # FLD AUTO: 8.5 K/UL — SIGNIFICANT CHANGE UP (ref 3.8–10.5)

## 2018-02-13 PROCEDURE — 80053 COMPREHEN METABOLIC PANEL: CPT

## 2018-02-13 PROCEDURE — C1874: CPT

## 2018-02-13 PROCEDURE — C1725: CPT

## 2018-02-13 PROCEDURE — 80048 BASIC METABOLIC PNL TOTAL CA: CPT

## 2018-02-13 PROCEDURE — 93005 ELECTROCARDIOGRAM TRACING: CPT

## 2018-02-13 PROCEDURE — C1769: CPT

## 2018-02-13 PROCEDURE — C1887: CPT

## 2018-02-13 PROCEDURE — 71046 X-RAY EXAM CHEST 2 VIEWS: CPT

## 2018-02-13 PROCEDURE — 99153 MOD SED SAME PHYS/QHP EA: CPT

## 2018-02-13 PROCEDURE — C9606: CPT | Mod: RI

## 2018-02-13 PROCEDURE — 99285 EMERGENCY DEPT VISIT HI MDM: CPT | Mod: 25

## 2018-02-13 PROCEDURE — C1885: CPT

## 2018-02-13 PROCEDURE — 99152 MOD SED SAME PHYS/QHP 5/>YRS: CPT

## 2018-02-13 PROCEDURE — 85027 COMPLETE CBC AUTOMATED: CPT

## 2018-02-13 PROCEDURE — 93458 L HRT ARTERY/VENTRICLE ANGIO: CPT | Mod: 59

## 2018-02-13 PROCEDURE — C1894: CPT

## 2018-02-13 PROCEDURE — 84484 ASSAY OF TROPONIN QUANT: CPT

## 2018-02-13 RX ORDER — ASPIRIN/CALCIUM CARB/MAGNESIUM 324 MG
1 TABLET ORAL
Qty: 30 | Refills: 11
Start: 2018-02-13 | End: 2019-02-07

## 2018-02-13 RX ORDER — CLOPIDOGREL BISULFATE 75 MG/1
1 TABLET, FILM COATED ORAL
Qty: 30 | Refills: 11
Start: 2018-02-13 | End: 2019-02-07

## 2018-02-13 RX ORDER — CHOLECALCIFEROL (VITAMIN D3) 125 MCG
1 CAPSULE ORAL
Qty: 0 | Refills: 0 | COMMUNITY

## 2018-02-13 RX ADMIN — Medication 81 MILLIGRAM(S): at 05:12

## 2018-02-13 RX ADMIN — CLOPIDOGREL BISULFATE 75 MILLIGRAM(S): 75 TABLET, FILM COATED ORAL at 05:12

## 2018-02-13 NOTE — PROGRESS NOTE ADULT - SUBJECTIVE AND OBJECTIVE BOX
68y old  Male who presents with a chief complaint of Chest pain (2018 00:49) now s/p cardiac cath HOLLY x 1 ramus via right radial access.         Allergies    No Known Allergies    Intolerances        Medications:  aspirin enteric coated 81 milliGRAM(s) Oral daily  atorvastatin 40 milliGRAM(s) Oral at bedtime  clopidogrel Tablet 75 milliGRAM(s) Oral daily  isosorbide   mononitrate ER Tablet (IMDUR) 30 milliGRAM(s) Oral daily  metoprolol succinate ER 50 milliGRAM(s) Oral daily      Vitals:  T(C): 36.6 (18 @ 19:00), Max: 36.9 (18 @ 10:20)  HR: 64 (18 @ 21:00) (57 - 71)  BP: 110/55 (18 @ 21:00) (110/55 - 162/73)  BP(mean): 3 (18 @ 17:05) (3 - 102)  RR: 18 (18 @ 21:00) (16 - 18)  SpO2: 98% (18 @ 21:00) (95% - 100%)  Wt(kg): --  Daily Height in cm: 167.64 (2018 14:35)    Daily Weight in k (2018 12:57)  I&O's Summary    2018 07:01  -  2018 05:04  --------------------------------------------------------  IN: 240 mL / OUT: 350 mL / NET: -110 mL          Physical Exam:  Appearance: Normal  Eyes: PERRL, EOMI  HENT: Normal oral mus[place]a, NC/AT  Cardiovascular: S1S2, RRR, No M/R/G, no JVD, No Lower extremity edema  Procedural Access Site: Right radial artery access.  No hematoma, Non-tender to palpation, 2+ pulse, No bruit, No Ecchymosis  Respiratory: Clear to auscultation bilaterally  Gastrointestinal: Soft, Non tender, Normal Bowel Sounds  Musculoskeletal: No clubbing, No joint deformity   Neurologic: Non-focal  Psychiatry: AAOx3, Mood & affect appropriate  Skin: No rashes, No ecchymoses, No cyanosis        141  |  105  |  15  ----------------------------<  109<H>  4.0   |  23  |  0.76    Ca    9.4      2018 02:58    TPro  8.1  /  Alb  4.4  /  TBili  0.6  /  DBili  x   /  AST  28  /  ALT  19  /  AlkPhos  59  02-12      CARDIAC MARKERS ( 2018 10:58 )  x     / 0.08 ng/mL / x     / x     / x            Interpretation of Telemetry:    ASSESSMENT/PLAN   68y old  Male who presents with a chief complaint of Chest pain (2018 00:49) now s/p cardiac cath HOLLY x 1 ramus via right radial access. Pt tolerated the procedure well. Overnight  remained uneventful. Cardiac catyh site benign. Post-procedure discharge instructions discussed and questions addressed. Discharge pending 68y old  Male who presents with a chief complaint of Chest pain (2018 00:49) now s/p cardiac cath HOLLY x 1 ramus via right radial access.         Allergies    No Known Allergies    Intolerances        Medications:  aspirin enteric coated 81 milliGRAM(s) Oral daily  atorvastatin 40 milliGRAM(s) Oral at bedtime  clopidogrel Tablet 75 milliGRAM(s) Oral daily  isosorbide   mononitrate ER Tablet (IMDUR) 30 milliGRAM(s) Oral daily  metoprolol succinate ER 50 milliGRAM(s) Oral daily      Vitals:  T(C): 36.6 (18 @ 19:00), Max: 36.9 (18 @ 10:20)  HR: 64 (18 @ 21:00) (57 - 71)  BP: 110/55 (18 @ 21:00) (110/55 - 162/73)  BP(mean): 3 (18 @ 17:05) (3 - 102)  RR: 18 (18 @ 21:00) (16 - 18)  SpO2: 98% (18 @ 21:00) (95% - 100%)  Wt(kg): --  Daily Height in cm: 167.64 (2018 14:35)    Daily Weight in k (2018 12:57)  I&O's Summary    2018 07:01  -  2018 05:04  --------------------------------------------------------  IN: 240 mL / OUT: 350 mL / NET: -110 mL          Physical Exam:  Appearance: Normal  Eyes: PERRL, EOMI  HENT: Normal oral mus[place]a, NC/AT  Cardiovascular: S1S2, RRR, No M/R/G, no JVD, No Lower extremity edema  Procedural Access Site: Right radial artery access.  No hematoma, Non-tender to palpation, 2+ pulse, No bruit, No Ecchymosis  Respiratory: Clear to auscultation bilaterally  Gastrointestinal: Soft, Non tender, Normal Bowel Sounds  Musculoskeletal: No clubbing, No joint deformity   Neurologic: Non-focal  Psychiatry: AAOx3, Mood & affect appropriate  Skin: No rashes, No ecchymoses, No cyanosis        141  |  105  |  15  ----------------------------<  109<H>  4.0   |  23  |  0.76    Ca    9.4      2018 02:58    TPro  8.1  /  Alb  4.4  /  TBili  0.6  /  DBili  x   /  AST  28  /  ALT  19  /  AlkPhos  59  02-12      CARDIAC MARKERS ( 2018 10:58 )  x     / 0.08 ng/mL / x     / x     / x            Interpretation of Telemetry: SB-65bpm     ASSESSMENT/PLAN   68y old  Male who presents with a chief complaint of Chest pain (2018 00:49) now s/p cardiac cath HOLLY x 1 ramus via right radial access. Pt tolerated the procedure well. Overnight  remained uneventful. Cardiac catyh site benign. Post-procedure discharge instructions discussed and questions addressed. Discharge pending

## 2018-02-13 NOTE — DISCHARGE NOTE ADULT - MEDICATION SUMMARY - MEDICATIONS TO TAKE
I will START or STAY ON the medications listed below when I get home from the hospital:    aspirin 81 mg oral delayed release tablet  -- 1 tab(s) by mouth once a day  -- Indication: For To keep stent patent    isosorbide mononitrate 30 mg oral tablet, extended release  -- 1 tab(s) by mouth once a day (in the morning)  -- Indication: For High blood pressure    rosuvastatin 10 mg oral tablet  -- 1 tab(s) by mouth once a day (at bedtime)  -- Indication: For High cholesterol     clopidogrel 75 mg oral tablet  -- 1 tab(s) by mouth once a day  -- Indication: For To keep stent patent     metoprolol succinate 50 mg oral tablet, extended release  -- 1 tab(s) by mouth once a day  -- Indication: For High blood pressure

## 2018-02-13 NOTE — DISCHARGE NOTE ADULT - PLAN OF CARE
Pt remains chest pain free and understands post cath discharge instructions No heavy lifting or pushing/pulling with procedure arm for 2 weeks. No driving for 2 days. You may shower 24 hours following the procedure but avoid baths/swimming for 1 week. Check your wrist site for bleeding and/or swelling daily following procedure and call your doctor immediately if it occurs or if you experience increased pain at the site. Follow up with your cardiologist in 1-2 weeks. You may call [facility] if you have any questions/concerns regarding your procedure [phone]. Your blood pressure will be controlled. Continue with your blood pressure medications; eat a heart healthy diet with low salt diet; exercise regularly (consult with your physician or cardiologist first); maintain a heart healthy weight; if you smoke - quit (A resource to help you stop smoking is the [facility] – phone number [phone].); include healthy ways to manage stress. Continue to follow with your primary care physician or cardiologist. Your LDL cholesterol will be less than 70mg/dL Continue with your cholesterol medications. Eat a heart healthy diet that is low in saturated fats and salt, and includes whole grains, fruits, vegetables and lean protein; exercise regularly (consult with your physician or cardiologist first); maintain a heart healthy weight. Continue to follow with your primary physician or cardiologist for treatment goals, continue medication, have liver function testing every 3 months as anti lipid medications can cause liver irritation. If you smoke - quit (A resource to help you stop smoking is the [facility] – phone number [phone].).

## 2018-02-13 NOTE — DISCHARGE NOTE ADULT - CARE PLAN
Principal Discharge DX:	Coronary artery disease of native artery of native heart with stable angina pectoris  Goal:	Pt remains chest pain free and understands post cath discharge instructions  Assessment and plan of treatment:	No heavy lifting or pushing/pulling with procedure arm for 2 weeks. No driving for 2 days. You may shower 24 hours following the procedure but avoid baths/swimming for 1 week. Check your wrist site for bleeding and/or swelling daily following procedure and call your doctor immediately if it occurs or if you experience increased pain at the site. Follow up with your cardiologist in 1-2 weeks. You may call [facility] if you have any questions/concerns regarding your procedure [phone].  Secondary Diagnosis:	HTN (hypertension)  Goal:	Your blood pressure will be controlled.  Assessment and plan of treatment:	Continue with your blood pressure medications; eat a heart healthy diet with low salt diet; exercise regularly (consult with your physician or cardiologist first); maintain a heart healthy weight; if you smoke - quit (A resource to help you stop smoking is the [facility] [place] [facility] – phone number [phone].); include healthy ways to manage stress. Continue to follow with your primary care physician or cardiologist.  Secondary Diagnosis:	Hyperlipidemia, unspecified hyperlipidemia type  Goal:	Your LDL cholesterol will be less than 70mg/dL  Assessment and plan of treatment:	Continue with your cholesterol medications. Eat a heart healthy diet that is low in saturated fats and salt, and includes whole grains, fruits, vegetables and lean protein; exercise regularly (consult with your physician or cardiologist first); maintain a heart healthy weight. Continue to follow with your primary physician or cardiologist for treatment goals, continue medication, have liver function testing every 3 months as anti lipid medications can cause liver irritation. If you smoke - quit (A resource to help you stop smoking is the [facility] [place] [facility] – phone number [phone].).

## 2018-02-13 NOTE — DISCHARGE NOTE ADULT - PATIENT PORTAL LINK FT
You can access the [facility] Patient Portal, offered by [facility], by registering with the following website: [email or web address]

## 2018-02-13 NOTE — DISCHARGE NOTE ADULT - HOSPITAL COURSE
69 y/o Male with pmh of HTN, HLD, CAD s/p MI with HOLLY x 4 (mCx 2006, pRCA 2009, pLAD 9/14/17, RI 80% x 1 10/17/17) on asa/plavix, GERD seen by Dr. Pearson, Cardiologist this am for followup for his cardiotherapy with reports of chest pressure while on the treadmill after 15minutes at 3.4 miles/hr with an incline and when he was cycling last week was the last occurrence that stopped after 4 minutes of rest.  Pt states having ECG changes at the time.  This was reported today to Dr. Pearson and he was sent to the ER with CXR normal and Trop elevated 0.08.  Pt presents for cardiac cath and denies cp, sob or palpitations presently and was given 4 baby asa in the ER took his plavix last night and is on Metoprolol and Imdur as his antianginal regimen. Pt is now s/p cardiac cath HOLLY x 1 ramus (95%) via right radial artery. Pt tolerated the procedure well. Cardiac cath site benign Post-procedure discharge instructions discussed and questions addressed

## 2018-02-14 ENCOUNTER — MEDICATION RENEWAL (OUTPATIENT)
Age: 68
End: 2018-02-14

## 2018-02-15 ENCOUNTER — APPOINTMENT (OUTPATIENT)
Dept: CARDIOLOGY | Facility: CLINIC | Age: 68
End: 2018-02-15

## 2018-02-19 ENCOUNTER — APPOINTMENT (OUTPATIENT)
Dept: CARDIOLOGY | Facility: CLINIC | Age: 68
End: 2018-02-19
Payer: COMMERCIAL

## 2018-02-19 ENCOUNTER — NON-APPOINTMENT (OUTPATIENT)
Age: 68
End: 2018-02-19

## 2018-02-19 VITALS — DIASTOLIC BLOOD PRESSURE: 70 MMHG | HEART RATE: 66 BPM | SYSTOLIC BLOOD PRESSURE: 138 MMHG | OXYGEN SATURATION: 98 %

## 2018-02-19 VITALS — WEIGHT: 141 LBS | HEIGHT: 63 IN | BODY MASS INDEX: 24.98 KG/M2

## 2018-02-19 PROCEDURE — 96372 THER/PROPH/DIAG INJ SC/IM: CPT

## 2018-02-19 PROCEDURE — 93000 ELECTROCARDIOGRAM COMPLETE: CPT

## 2018-02-19 PROCEDURE — 99215 OFFICE O/P EST HI 40 MIN: CPT | Mod: 25

## 2018-02-19 RX ADMIN — CYANOCOBALAMIN 0 MCG/ML: 1000 INJECTION, SOLUTION INTRAMUSCULAR at 00:00

## 2018-02-21 RX ORDER — CYANOCOBALAMIN 1000 UG/ML
1000 INJECTION INTRAMUSCULAR; SUBCUTANEOUS
Qty: 0 | Refills: 0 | Status: COMPLETED | OUTPATIENT
Start: 2018-02-19

## 2018-02-26 ENCOUNTER — NON-APPOINTMENT (OUTPATIENT)
Age: 68
End: 2018-02-26

## 2018-02-26 ENCOUNTER — APPOINTMENT (OUTPATIENT)
Dept: CARDIOLOGY | Facility: CLINIC | Age: 68
End: 2018-02-26
Payer: COMMERCIAL

## 2018-02-26 VITALS
HEART RATE: 60 BPM | DIASTOLIC BLOOD PRESSURE: 75 MMHG | HEIGHT: 63 IN | BODY MASS INDEX: 24.98 KG/M2 | SYSTOLIC BLOOD PRESSURE: 130 MMHG | WEIGHT: 141 LBS | OXYGEN SATURATION: 100 %

## 2018-02-26 PROCEDURE — 99214 OFFICE O/P EST MOD 30 MIN: CPT

## 2018-02-26 PROCEDURE — 93000 ELECTROCARDIOGRAM COMPLETE: CPT

## 2018-02-26 RX ORDER — PROMETHAZINE HYDROCHLORIDE AND DEXTROMETHORPHAN HYDROBROMIDE ORAL SOLUTION 15; 6.25 MG/5ML; MG/5ML
6.25-15 SOLUTION ORAL
Qty: 1 | Refills: 0 | Status: DISCONTINUED | COMMUNITY
Start: 2018-02-19 | End: 2018-02-26

## 2018-03-05 ENCOUNTER — APPOINTMENT (OUTPATIENT)
Dept: CARDIOLOGY | Facility: CLINIC | Age: 68
End: 2018-03-05

## 2018-03-07 ENCOUNTER — APPOINTMENT (OUTPATIENT)
Dept: CARDIOLOGY | Facility: CLINIC | Age: 68
End: 2018-03-07

## 2018-03-08 ENCOUNTER — APPOINTMENT (OUTPATIENT)
Dept: CARDIOLOGY | Facility: CLINIC | Age: 68
End: 2018-03-08

## 2018-03-12 ENCOUNTER — APPOINTMENT (OUTPATIENT)
Dept: CARDIOLOGY | Facility: CLINIC | Age: 68
End: 2018-03-12

## 2018-03-14 ENCOUNTER — APPOINTMENT (OUTPATIENT)
Dept: CARDIOLOGY | Facility: CLINIC | Age: 68
End: 2018-03-14

## 2018-03-15 ENCOUNTER — APPOINTMENT (OUTPATIENT)
Dept: CARDIOLOGY | Facility: CLINIC | Age: 68
End: 2018-03-15

## 2018-03-19 ENCOUNTER — APPOINTMENT (OUTPATIENT)
Dept: CARDIOLOGY | Facility: CLINIC | Age: 68
End: 2018-03-19
Payer: COMMERCIAL

## 2018-03-19 ENCOUNTER — APPOINTMENT (OUTPATIENT)
Dept: CARDIOLOGY | Facility: CLINIC | Age: 68
End: 2018-03-19

## 2018-03-19 ENCOUNTER — MED ADMIN CHARGE (OUTPATIENT)
Age: 68
End: 2018-03-19

## 2018-03-19 DIAGNOSIS — E53.8 DEFICIENCY OF OTHER SPECIFIED B GROUP VITAMINS: ICD-10-CM

## 2018-03-19 PROCEDURE — 96372 THER/PROPH/DIAG INJ SC/IM: CPT

## 2018-03-19 RX ORDER — CYANOCOBALAMIN 1000 UG/ML
1000 INJECTION INTRAMUSCULAR; SUBCUTANEOUS
Qty: 0 | Refills: 0 | Status: COMPLETED | OUTPATIENT
Start: 2018-03-19

## 2018-03-19 RX ADMIN — CYANOCOBALAMIN 0 MCG/ML: 1000 INJECTION INTRAMUSCULAR; SUBCUTANEOUS at 00:00

## 2018-03-21 ENCOUNTER — APPOINTMENT (OUTPATIENT)
Dept: CARDIOLOGY | Facility: CLINIC | Age: 68
End: 2018-03-21

## 2018-03-22 ENCOUNTER — APPOINTMENT (OUTPATIENT)
Dept: CARDIOLOGY | Facility: CLINIC | Age: 68
End: 2018-03-22

## 2018-03-26 ENCOUNTER — APPOINTMENT (OUTPATIENT)
Dept: CARDIOLOGY | Facility: CLINIC | Age: 68
End: 2018-03-26

## 2018-03-28 ENCOUNTER — APPOINTMENT (OUTPATIENT)
Dept: CARDIOLOGY | Facility: CLINIC | Age: 68
End: 2018-03-28

## 2018-03-29 ENCOUNTER — APPOINTMENT (OUTPATIENT)
Dept: CARDIOLOGY | Facility: CLINIC | Age: 68
End: 2018-03-29

## 2018-04-02 ENCOUNTER — APPOINTMENT (OUTPATIENT)
Dept: CARDIOLOGY | Facility: CLINIC | Age: 68
End: 2018-04-02

## 2018-04-04 ENCOUNTER — APPOINTMENT (OUTPATIENT)
Dept: CARDIOLOGY | Facility: CLINIC | Age: 68
End: 2018-04-04

## 2018-04-05 ENCOUNTER — APPOINTMENT (OUTPATIENT)
Dept: CARDIOLOGY | Facility: CLINIC | Age: 68
End: 2018-04-05

## 2018-04-09 ENCOUNTER — APPOINTMENT (OUTPATIENT)
Dept: CARDIOLOGY | Facility: CLINIC | Age: 68
End: 2018-04-09

## 2018-04-11 ENCOUNTER — APPOINTMENT (OUTPATIENT)
Dept: CARDIOLOGY | Facility: CLINIC | Age: 68
End: 2018-04-11

## 2018-04-22 NOTE — DISCHARGE NOTE ADULT - MEDICATION SUMMARY - MEDICATIONS TO CHANGE
I will SWITCH the dose or number of times a day I take the medications listed below when I get home from the hospital:  None ADMIT

## 2018-04-23 ENCOUNTER — APPOINTMENT (OUTPATIENT)
Dept: CARDIOLOGY | Facility: CLINIC | Age: 68
End: 2018-04-23
Payer: COMMERCIAL

## 2018-04-23 VITALS
HEIGHT: 63 IN | SYSTOLIC BLOOD PRESSURE: 118 MMHG | OXYGEN SATURATION: 96 % | HEART RATE: 73 BPM | BODY MASS INDEX: 25.16 KG/M2 | WEIGHT: 142 LBS | DIASTOLIC BLOOD PRESSURE: 63 MMHG

## 2018-04-23 PROCEDURE — 99214 OFFICE O/P EST MOD 30 MIN: CPT | Mod: 25

## 2018-04-23 PROCEDURE — 96372 THER/PROPH/DIAG INJ SC/IM: CPT

## 2018-04-23 PROCEDURE — 93000 ELECTROCARDIOGRAM COMPLETE: CPT

## 2018-05-13 ENCOUNTER — RX RENEWAL (OUTPATIENT)
Age: 68
End: 2018-05-13

## 2018-05-21 ENCOUNTER — APPOINTMENT (OUTPATIENT)
Dept: CARDIOLOGY | Facility: CLINIC | Age: 68
End: 2018-05-21
Payer: COMMERCIAL

## 2018-05-21 PROCEDURE — 96372 THER/PROPH/DIAG INJ SC/IM: CPT

## 2018-06-18 ENCOUNTER — APPOINTMENT (OUTPATIENT)
Dept: CARDIOLOGY | Facility: CLINIC | Age: 68
End: 2018-06-18
Payer: COMMERCIAL

## 2018-06-18 PROCEDURE — 96372 THER/PROPH/DIAG INJ SC/IM: CPT

## 2018-06-18 RX ORDER — CYANOCOBALAMIN 1000 UG/ML
1000 INJECTION INTRAMUSCULAR; SUBCUTANEOUS
Qty: 0 | Refills: 0 | Status: COMPLETED | OUTPATIENT
Start: 2018-06-18

## 2018-06-18 RX ADMIN — Medication 0 MCG/ML: at 00:00

## 2018-07-23 ENCOUNTER — APPOINTMENT (OUTPATIENT)
Dept: CARDIOLOGY | Facility: CLINIC | Age: 68
End: 2018-07-23
Payer: COMMERCIAL

## 2018-07-23 ENCOUNTER — NON-APPOINTMENT (OUTPATIENT)
Age: 68
End: 2018-07-23

## 2018-07-23 VITALS
OXYGEN SATURATION: 98 % | WEIGHT: 141 LBS | HEART RATE: 66 BPM | BODY MASS INDEX: 24.98 KG/M2 | DIASTOLIC BLOOD PRESSURE: 67 MMHG | SYSTOLIC BLOOD PRESSURE: 115 MMHG | HEIGHT: 63 IN

## 2018-07-23 DIAGNOSIS — I20.9 ANGINA PECTORIS, UNSPECIFIED: ICD-10-CM

## 2018-07-23 PROBLEM — Z95.5 PRESENCE OF CORONARY ANGIOPLASTY IMPLANT AND GRAFT: Chronic | Status: ACTIVE | Noted: 2017-10-17

## 2018-07-23 PROBLEM — I10 ESSENTIAL (PRIMARY) HYPERTENSION: Chronic | Status: ACTIVE | Noted: 2017-10-17

## 2018-07-23 PROCEDURE — 99214 OFFICE O/P EST MOD 30 MIN: CPT | Mod: 25

## 2018-07-23 PROCEDURE — 93000 ELECTROCARDIOGRAM COMPLETE: CPT

## 2018-09-20 ENCOUNTER — APPOINTMENT (OUTPATIENT)
Dept: CARDIOLOGY | Facility: CLINIC | Age: 68
End: 2018-09-20
Payer: COMMERCIAL

## 2018-09-20 ENCOUNTER — NON-APPOINTMENT (OUTPATIENT)
Age: 68
End: 2018-09-20

## 2018-09-20 VITALS
SYSTOLIC BLOOD PRESSURE: 139 MMHG | WEIGHT: 145 LBS | OXYGEN SATURATION: 99 % | DIASTOLIC BLOOD PRESSURE: 64 MMHG | HEIGHT: 63 IN | BODY MASS INDEX: 25.69 KG/M2 | HEART RATE: 60 BPM

## 2018-09-20 PROCEDURE — 99215 OFFICE O/P EST HI 40 MIN: CPT | Mod: 25

## 2018-09-20 PROCEDURE — 93000 ELECTROCARDIOGRAM COMPLETE: CPT

## 2018-10-08 ENCOUNTER — MEDICATION RENEWAL (OUTPATIENT)
Age: 68
End: 2018-10-08

## 2018-10-08 ENCOUNTER — RX RENEWAL (OUTPATIENT)
Age: 68
End: 2018-10-08

## 2018-11-15 ENCOUNTER — APPOINTMENT (OUTPATIENT)
Dept: CARDIOLOGY | Facility: CLINIC | Age: 68
End: 2018-11-15
Payer: COMMERCIAL

## 2018-11-15 ENCOUNTER — NON-APPOINTMENT (OUTPATIENT)
Age: 68
End: 2018-11-15

## 2018-11-15 ENCOUNTER — APPOINTMENT (OUTPATIENT)
Dept: CARDIOLOGY | Facility: CLINIC | Age: 68
End: 2018-11-15

## 2018-11-15 VITALS — SYSTOLIC BLOOD PRESSURE: 134 MMHG | DIASTOLIC BLOOD PRESSURE: 60 MMHG | OXYGEN SATURATION: 98 % | HEART RATE: 69 BPM

## 2018-11-15 VITALS — BODY MASS INDEX: 26.05 KG/M2 | HEIGHT: 63 IN | WEIGHT: 147 LBS

## 2018-11-15 DIAGNOSIS — R93.1 ABNORMAL FINDINGS ON DIAGNOSTIC IMAGING OF HEART AND CORONARY CIRCULATION: ICD-10-CM

## 2018-11-15 PROCEDURE — 93000 ELECTROCARDIOGRAM COMPLETE: CPT

## 2018-11-15 PROCEDURE — 99214 OFFICE O/P EST MOD 30 MIN: CPT | Mod: 25

## 2018-11-15 NOTE — PHYSICAL EXAM
[General Appearance - Well Developed] : well developed [Normal Appearance] : normal appearance [Well Groomed] : well groomed [General Appearance - Well Nourished] : well nourished [No Deformities] : no deformities [General Appearance - In No Acute Distress] : no acute distress [Normal Conjunctiva] : the conjunctiva exhibited no abnormalities [Eyelids - No Xanthelasma] : the eyelids demonstrated no xanthelasmas [Normal Oral Mucosa] : normal oral mucosa [No Oral Pallor] : no oral pallor [No Oral Cyanosis] : no oral cyanosis [Normal Jugular Venous A Waves Present] : normal jugular venous A waves present [Normal Jugular Venous V Waves Present] : normal jugular venous V waves present [No Jugular Venous Rosario A Waves] : no jugular venous rosario A waves [Respiration, Rhythm And Depth] : normal respiratory rhythm and effort [Exaggerated Use Of Accessory Muscles For Inspiration] : no accessory muscle use [Auscultation Breath Sounds / Voice Sounds] : lungs were clear to auscultation bilaterally [Heart Rate And Rhythm] : heart rate and rhythm were normal [Heart Sounds] : normal S1 and S2 [Murmurs] : no murmurs present [Abdomen Soft] : soft [Abdomen Tenderness] : non-tender [Abdomen Mass (___ Cm)] : no abdominal mass palpated [Abnormal Walk] : normal gait [Gait - Sufficient For Exercise Testing] : the gait was sufficient for exercise testing [Nail Clubbing] : no clubbing of the fingernails [Cyanosis, Localized] : no localized cyanosis [Petechial Hemorrhages (___cm)] : no petechial hemorrhages [Skin Color & Pigmentation] : normal skin color and pigmentation [] : no rash [No Venous Stasis] : no venous stasis [Skin Lesions] : no skin lesions [No Skin Ulcers] : no skin ulcer [No Xanthoma] : no  xanthoma was observed [Oriented To Time, Place, And Person] : oriented to person, place, and time [Affect] : the affect was normal [Mood] : the mood was normal [No Anxiety] : not feeling anxious

## 2018-11-15 NOTE — REASON FOR VISIT
[Follow-Up - Clinic] : a clinic follow-up of [Abnormal ECG] : an abnormal ECG [Coronary Artery Disease] : coronary artery disease [Fatigue] : feeling tired (fatigue) [Hyperlipidemia] : hyperlipidemia [Hypertension] : hypertension [Medication Management] : Medication management [FreeTextEntry1] : \par f/u visit for CAD,stable angina

## 2018-11-15 NOTE — HISTORY OF PRESENT ILLNESS
[FreeTextEntry1] : Patient  with history of hypertension, hyperlipidemia and coronary artery disease.\par  status post stent placement 2006  LCX /2009 RCA .  LAD 9/2017 10/2017 , Intermediate  feb 2018 , who came for follow up denies any recurrence of exertional chest pain .\par \par Patient blood pressure is controlled , hyperlipidemia controlled \par \par \par  \par

## 2018-11-15 NOTE — DISCUSSION/SUMMARY
[Coronary Artery Disease] : coronary artery disease [Hyperlipidemia] : hyperlipidemia [Stable] : stable [Diet Modification] : diet modification [Exercise] : exercise [Weight Loss] : weight loss [<70] : goal is <70 [<150] : goal is <150 [>40] : goal is >40 [Patient] : the patient [Hypertension] : hypertension [Continue] : continuing beta blockers [Sodium Restriction] : sodium restriction [FreeTextEntry1] : \par \par Patient with CAD with multiple intervention in multiple vessel , last 2/2018 intermediate branch , now without recurrent angina , stable , continue current medication ,  patient did not tolerate ACE due to cough , recommend  ARBS  he is reluctant to it , \par \par HTN   controlled , continue medication ,  low salt diet\par \par Hyperlipidemia : continue medication , target LDL <70 \par \par Abnormal EKG stable \par \par \par patient will have blood work with primary in jan 2019 \par \par \par  \par \par \par \par \par

## 2018-11-15 NOTE — CARDIOLOGY SUMMARY
[No Exercise Ind Arr] : no exercise induced arrhythmias [No Symptoms] : no Symptoms [Ant Wall Defect] : anterior wall defect [Inf Wall Defect] : inferior wall defect [Fixed Defect] : fixed defect [___] : [unfilled] [LVEF ___%] : LVEF [unfilled]%

## 2018-11-15 NOTE — REVIEW OF SYSTEMS
[Fever] : no fever [Chills] : no chills [Blurry Vision] : no blurred vision [Eyeglasses] : not currently wearing eyeglasses [Earache] : no earache [Mouth Sores] : no mouth sores [Shortness Of Breath] : no shortness of breath [Chest Pain] : no chest pain [Cough] : no cough [Abdominal Pain] : no abdominal pain [Heartburn] : no heartburn [Dysphagia] : no dysphagia

## 2018-12-30 NOTE — ED CDU PROVIDER NOTE - EYES, MLM
Implemented All Universal Safety Interventions:  [place] to call system. Call bell, personal items and telephone within reach. Instruct patient to call for assistance. Room bathroom lighting operational. Non-slip footwear when patient is off stretcher. Physically safe environment: no spills, clutter or unnecessary equipment. Stretcher in lowest position, wheels locked, appropriate side rails in place.
Clear bilaterally, pupils equal, round and reactive to light.

## 2019-01-07 ENCOUNTER — MEDICATION RENEWAL (OUTPATIENT)
Age: 69
End: 2019-01-07

## 2019-01-08 ENCOUNTER — RX RENEWAL (OUTPATIENT)
Age: 69
End: 2019-01-08

## 2019-01-29 ENCOUNTER — APPOINTMENT (OUTPATIENT)
Dept: GASTROENTEROLOGY | Facility: CLINIC | Age: 69
End: 2019-01-29
Payer: COMMERCIAL

## 2019-01-29 VITALS
WEIGHT: 147 LBS | BODY MASS INDEX: 26.05 KG/M2 | SYSTOLIC BLOOD PRESSURE: 172 MMHG | OXYGEN SATURATION: 98 % | HEIGHT: 63 IN | HEART RATE: 72 BPM | DIASTOLIC BLOOD PRESSURE: 80 MMHG

## 2019-01-29 DIAGNOSIS — Z12.11 ENCOUNTER FOR SCREENING FOR MALIGNANT NEOPLASM OF COLON: ICD-10-CM

## 2019-01-29 DIAGNOSIS — R19.5 OTHER FECAL ABNORMALITIES: ICD-10-CM

## 2019-01-29 DIAGNOSIS — Z12.12 ENCOUNTER FOR SCREENING FOR MALIGNANT NEOPLASM OF COLON: ICD-10-CM

## 2019-01-29 PROCEDURE — 99204 OFFICE O/P NEW MOD 45 MIN: CPT

## 2019-01-29 RX ORDER — ISOSORBIDE MONONITRATE 30 MG/1
30 TABLET, EXTENDED RELEASE ORAL DAILY
Qty: 90 | Refills: 0 | Status: DISCONTINUED | COMMUNITY
Start: 2018-02-12 | End: 2019-01-29

## 2019-01-29 NOTE — CONSULT LETTER
[Dear  ___] : Dear  [unfilled], [Consult Letter:] : I had the pleasure of evaluating your patient, [unfilled]. [Please see my note below.] : Please see my note below. [Consult Closing:] : Thank you very much for allowing me to participate in the care of this patient.  If you have any questions, please do not hesitate to contact me. [Sincerely,] : Sincerely, [FreeTextEntry3] : Fortunato De La Garza MD

## 2019-01-29 NOTE — ASSESSMENT
[FreeTextEntry1] : My impression is that of a male due for a screening colonoscopy. He thinks he may have been found to be fecal occult positive, as well.\par \par I've asked the patient to eat a high-fiber diet with soluble fiber supplementation and repeat his stool occult blood test. Given his increased risk for procedures (requiring stopping Plavix), after discussing the alternatives, the patient is to go for fecal DNA stool testing in addition to repeating the occult blood test. If they are positive then colonoscopy should be entertained with cardiology clearance.

## 2019-01-29 NOTE — HISTORY OF PRESENT ILLNESS
[de-identified] : The patient moves bowels daily. Denies any abdominal pain, constipation, diarrhea, bright red blood per rectum, though he thinks his PMD found him to be FOBT on rectal exam. Weight is increasing.\par \par No heartburn, odynophagia, dysphagia or early satiety.\par \par No history of anemia or abnormal liver enzymes.\par \par Due for screening colonoscopy as last study over 12 years ago.

## 2019-01-29 NOTE — PHYSICAL EXAM
[Sclera] : the sclera and conjunctiva were normal [PERRL With Normal Accommodation] : pupils were equal in size, round, and reactive to light [Extraocular Movements] : extraocular movements were intact [Outer Ear] : the ears and nose were normal in appearance [Oropharynx] : the oropharynx was normal [Neck Appearance] : the appearance of the neck was normal [Neck Cervical Mass (___cm)] : no neck mass was observed [Jugular Venous Distention Increased] : there was no jugular-venous distention [Thyroid Diffuse Enlargement] : the thyroid was not enlarged [Thyroid Nodule] : there were no palpable thyroid nodules [Auscultation Breath Sounds / Voice Sounds] : lungs were clear to auscultation bilaterally [Heart Rate And Rhythm] : heart rate was normal and rhythm regular [Heart Sounds] : normal S1 and S2 [Heart Sounds Gallop] : no gallops [Murmurs] : no murmurs [Heart Sounds Pericardial Friction Rub] : no pericardial rub [Edema] : there was no peripheral edema [Bowel Sounds] : normal bowel sounds [Abdomen Soft] : soft [Abdomen Tenderness] : non-tender [Abdomen Mass (___ Cm)] : no abdominal mass palpated [Cervical Lymph Nodes Enlarged Posterior Bilaterally] : posterior cervical [Cervical Lymph Nodes Enlarged Anterior Bilaterally] : anterior cervical [No CVA Tenderness] : no ~M costovertebral angle tenderness [No Spinal Tenderness] : no spinal tenderness [Abnormal Walk] : normal gait [Nail Clubbing] : no clubbing  or cyanosis of the fingernails [Musculoskeletal - Swelling] : no joint swelling seen [Motor Tone] : muscle strength and tone were normal [Skin Color & Pigmentation] : normal skin color and pigmentation [Skin Turgor] : normal skin turgor [] : no rash [No Focal Deficits] : no focal deficits [Oriented To Time, Place, And Person] : oriented to person, place, and time [Impaired Insight] : insight and judgment were intact [Affect] : the affect was normal

## 2019-02-12 LAB — HEMOCCULT STL QL IA: NEGATIVE

## 2019-03-12 ENCOUNTER — APPOINTMENT (OUTPATIENT)
Dept: CARDIOLOGY | Facility: CLINIC | Age: 69
End: 2019-03-12

## 2019-03-14 ENCOUNTER — APPOINTMENT (OUTPATIENT)
Dept: CARDIOLOGY | Facility: CLINIC | Age: 69
End: 2019-03-14

## 2019-05-02 ENCOUNTER — APPOINTMENT (OUTPATIENT)
Dept: CARDIOLOGY | Facility: CLINIC | Age: 69
End: 2019-05-02
Payer: MEDICARE

## 2019-05-02 ENCOUNTER — NON-APPOINTMENT (OUTPATIENT)
Age: 69
End: 2019-05-02

## 2019-05-02 VITALS
DIASTOLIC BLOOD PRESSURE: 61 MMHG | SYSTOLIC BLOOD PRESSURE: 127 MMHG | WEIGHT: 149 LBS | HEART RATE: 87 BPM | HEIGHT: 63 IN | BODY MASS INDEX: 26.4 KG/M2 | OXYGEN SATURATION: 96 %

## 2019-05-02 PROCEDURE — 93000 ELECTROCARDIOGRAM COMPLETE: CPT

## 2019-05-02 PROCEDURE — 99214 OFFICE O/P EST MOD 30 MIN: CPT | Mod: 25

## 2019-05-02 NOTE — DISCUSSION/SUMMARY
[Coronary Artery Disease] : coronary artery disease [Stable] : stable [Hyperlipidemia] : hyperlipidemia [Diet Modification] : diet modification [Exercise] : exercise [<70] : goal is <70 [Weight Loss] : weight loss [<150] : goal is <150 [Hypertension] : hypertension [>40] : goal is >40 [Patient] : the patient [Sodium Restriction] : sodium restriction [Continue] : continuing beta blockers [FreeTextEntry1] : \par \par Patient with CAD with multiple intervention in multiple vessel , last 2/2018 intermediate branch  atypical chest pain two months ago without recurrence ,  will obtain nuclear stress test , continue current medication ,  patient did not tolerate ACE due to cough , recommend  ARBS  he is reluctant to it , \par \par HTN   controlled , continue medication ,  low salt diet\par \par Hyperlipidemia : continue medication , target LDL <70 \par \par Abnormal EKG stable   will obtain follow up echo \par \par \par \par \par \par  \par \par \par \par \par

## 2019-05-02 NOTE — REVIEW OF SYSTEMS
[Fever] : no fever [Chills] : no chills [Eyeglasses] : not currently wearing eyeglasses [Blurry Vision] : no blurred vision [Earache] : no earache [Shortness Of Breath] : no shortness of breath [Mouth Sores] : no mouth sores [Chest Pain] : no chest pain [Abdominal Pain] : no abdominal pain [Cough] : no cough [Heartburn] : no heartburn [Dysphagia] : no dysphagia

## 2019-05-02 NOTE — PHYSICAL EXAM
[Normal Appearance] : normal appearance [General Appearance - Well Developed] : well developed [Well Groomed] : well groomed [General Appearance - Well Nourished] : well nourished [No Deformities] : no deformities [General Appearance - In No Acute Distress] : no acute distress [Normal Conjunctiva] : the conjunctiva exhibited no abnormalities [Eyelids - No Xanthelasma] : the eyelids demonstrated no xanthelasmas [Normal Oral Mucosa] : normal oral mucosa [No Oral Pallor] : no oral pallor [No Oral Cyanosis] : no oral cyanosis [Normal Jugular Venous A Waves Present] : normal jugular venous A waves present [Normal Jugular Venous V Waves Present] : normal jugular venous V waves present [No Jugular Venous Rosario A Waves] : no jugular venous rosario A waves [Respiration, Rhythm And Depth] : normal respiratory rhythm and effort [Exaggerated Use Of Accessory Muscles For Inspiration] : no accessory muscle use [Auscultation Breath Sounds / Voice Sounds] : lungs were clear to auscultation bilaterally [Heart Rate And Rhythm] : heart rate and rhythm were normal [Heart Sounds] : normal S1 and S2 [Murmurs] : no murmurs present [Abdomen Soft] : soft [Abdomen Tenderness] : non-tender [Abdomen Mass (___ Cm)] : no abdominal mass palpated [Gait - Sufficient For Exercise Testing] : the gait was sufficient for exercise testing [Abnormal Walk] : normal gait [Petechial Hemorrhages (___cm)] : no petechial hemorrhages [Cyanosis, Localized] : no localized cyanosis [Nail Clubbing] : no clubbing of the fingernails [Skin Color & Pigmentation] : normal skin color and pigmentation [] : no ischemic changes [No Venous Stasis] : no venous stasis [No Skin Ulcers] : no skin ulcer [Skin Lesions] : no skin lesions [No Xanthoma] : no  xanthoma was observed [Mood] : the mood was normal [Oriented To Time, Place, And Person] : oriented to person, place, and time [Affect] : the affect was normal [No Anxiety] : not feeling anxious

## 2019-05-02 NOTE — REASON FOR VISIT
[Follow-Up - Clinic] : a clinic follow-up of [Abnormal ECG] : an abnormal ECG [Fatigue] : feeling tired (fatigue) [Coronary Artery Disease] : coronary artery disease [Hypertension] : hypertension [Hyperlipidemia] : hyperlipidemia [Medication Management] : Medication management [FreeTextEntry1] : \par f/u visit for CAD,stable angina

## 2019-05-13 ENCOUNTER — APPOINTMENT (OUTPATIENT)
Dept: CARDIOLOGY | Facility: CLINIC | Age: 69
End: 2019-05-13
Payer: MEDICARE

## 2019-05-13 PROCEDURE — 93306 TTE W/DOPPLER COMPLETE: CPT

## 2019-05-30 ENCOUNTER — OTHER (OUTPATIENT)
Age: 69
End: 2019-05-30

## 2019-05-30 ENCOUNTER — APPOINTMENT (OUTPATIENT)
Dept: CARDIOLOGY | Facility: CLINIC | Age: 69
End: 2019-05-30
Payer: MEDICARE

## 2019-05-30 PROCEDURE — A9500: CPT

## 2019-05-30 PROCEDURE — 78452 HT MUSCLE IMAGE SPECT MULT: CPT

## 2019-05-30 PROCEDURE — 93015 CV STRESS TEST SUPVJ I&R: CPT

## 2019-09-01 ENCOUNTER — EMERGENCY (EMERGENCY)
Facility: HOSPITAL | Age: 69
LOS: 1 days | Discharge: HOME | End: 2019-09-01
Attending: EMERGENCY MEDICINE
Payer: COMMERCIAL

## 2019-09-01 VITALS
SYSTOLIC BLOOD PRESSURE: 133 MMHG | HEART RATE: 60 BPM | DIASTOLIC BLOOD PRESSURE: 70 MMHG | OXYGEN SATURATION: 99 % | TEMPERATURE: 98 F | RESPIRATION RATE: 19 BRPM

## 2019-09-01 VITALS
WEIGHT: 147.05 LBS | SYSTOLIC BLOOD PRESSURE: 155 MMHG | OXYGEN SATURATION: 99 % | DIASTOLIC BLOOD PRESSURE: 70 MMHG | HEIGHT: 66 IN | RESPIRATION RATE: 16 BRPM | TEMPERATURE: 98 F | HEART RATE: 64 BPM

## 2019-09-01 DIAGNOSIS — Z95.9 PRESENCE OF CARDIAC AND VASCULAR IMPLANT AND GRAFT, UNSPECIFIED: Chronic | ICD-10-CM

## 2019-09-01 DIAGNOSIS — Z98.49 CATARACT EXTRACTION STATUS, UNSPECIFIED EYE: Chronic | ICD-10-CM

## 2019-09-01 DIAGNOSIS — Z98.890 OTHER SPECIFIED POSTPROCEDURAL STATES: Chronic | ICD-10-CM

## 2019-09-01 LAB
ALBUMIN SERPL ELPH-MCNC: 4.4 G/DL — SIGNIFICANT CHANGE UP (ref 3.3–5)
ALP SERPL-CCNC: 55 U/L — SIGNIFICANT CHANGE UP (ref 40–120)
ALT FLD-CCNC: 17 U/L — SIGNIFICANT CHANGE UP (ref 10–45)
ANION GAP SERPL CALC-SCNC: 12 MMOL/L — SIGNIFICANT CHANGE UP (ref 5–17)
AST SERPL-CCNC: 18 U/L — SIGNIFICANT CHANGE UP (ref 10–40)
BASOPHILS # BLD AUTO: 0 K/UL — SIGNIFICANT CHANGE UP (ref 0–0.2)
BASOPHILS NFR BLD AUTO: 0.4 % — SIGNIFICANT CHANGE UP (ref 0–2)
BILIRUB SERPL-MCNC: 0.4 MG/DL — SIGNIFICANT CHANGE UP (ref 0.2–1.2)
BUN SERPL-MCNC: 10 MG/DL — SIGNIFICANT CHANGE UP (ref 7–23)
CALCIUM SERPL-MCNC: 9.6 MG/DL — SIGNIFICANT CHANGE UP (ref 8.4–10.5)
CHLORIDE SERPL-SCNC: 105 MMOL/L — SIGNIFICANT CHANGE UP (ref 96–108)
CO2 SERPL-SCNC: 22 MMOL/L — SIGNIFICANT CHANGE UP (ref 22–31)
CREAT SERPL-MCNC: 0.58 MG/DL — SIGNIFICANT CHANGE UP (ref 0.5–1.3)
EOSINOPHIL # BLD AUTO: 0.5 K/UL — SIGNIFICANT CHANGE UP (ref 0–0.5)
EOSINOPHIL NFR BLD AUTO: 5.5 % — SIGNIFICANT CHANGE UP (ref 0–6)
GLUCOSE SERPL-MCNC: 137 MG/DL — HIGH (ref 70–99)
HCT VFR BLD CALC: 43.6 % — SIGNIFICANT CHANGE UP (ref 39–50)
HGB BLD-MCNC: 14.9 G/DL — SIGNIFICANT CHANGE UP (ref 13–17)
LYMPHOCYTES # BLD AUTO: 3.2 K/UL — SIGNIFICANT CHANGE UP (ref 1–3.3)
LYMPHOCYTES # BLD AUTO: 37.1 % — SIGNIFICANT CHANGE UP (ref 13–44)
MCHC RBC-ENTMCNC: 29.6 PG — SIGNIFICANT CHANGE UP (ref 27–34)
MCHC RBC-ENTMCNC: 34.1 GM/DL — SIGNIFICANT CHANGE UP (ref 32–36)
MCV RBC AUTO: 86.9 FL — SIGNIFICANT CHANGE UP (ref 80–100)
MONOCYTES # BLD AUTO: 0.7 K/UL — SIGNIFICANT CHANGE UP (ref 0–0.9)
MONOCYTES NFR BLD AUTO: 7.8 % — SIGNIFICANT CHANGE UP (ref 2–14)
NEUTROPHILS # BLD AUTO: 4.2 K/UL — SIGNIFICANT CHANGE UP (ref 1.8–7.4)
NEUTROPHILS NFR BLD AUTO: 49.1 % — SIGNIFICANT CHANGE UP (ref 43–77)
PLATELET # BLD AUTO: 203 K/UL — SIGNIFICANT CHANGE UP (ref 150–400)
POTASSIUM SERPL-MCNC: 4.2 MMOL/L — SIGNIFICANT CHANGE UP (ref 3.5–5.3)
POTASSIUM SERPL-SCNC: 4.2 MMOL/L — SIGNIFICANT CHANGE UP (ref 3.5–5.3)
PROT SERPL-MCNC: 7.4 G/DL — SIGNIFICANT CHANGE UP (ref 6–8.3)
RBC # BLD: 5.02 M/UL — SIGNIFICANT CHANGE UP (ref 4.2–5.8)
RBC # FLD: 12.2 % — SIGNIFICANT CHANGE UP (ref 10.3–14.5)
SODIUM SERPL-SCNC: 139 MMOL/L — SIGNIFICANT CHANGE UP (ref 135–145)
WBC # BLD: 8.6 K/UL — SIGNIFICANT CHANGE UP (ref 3.8–10.5)
WBC # FLD AUTO: 8.6 K/UL — SIGNIFICANT CHANGE UP (ref 3.8–10.5)

## 2019-09-01 PROCEDURE — 80053 COMPREHEN METABOLIC PANEL: CPT

## 2019-09-01 PROCEDURE — 99283 EMERGENCY DEPT VISIT LOW MDM: CPT

## 2019-09-01 PROCEDURE — 99284 EMERGENCY DEPT VISIT MOD MDM: CPT

## 2019-09-01 PROCEDURE — 93005 ELECTROCARDIOGRAM TRACING: CPT

## 2019-09-01 PROCEDURE — 85027 COMPLETE CBC AUTOMATED: CPT

## 2019-09-01 RX ORDER — ACETAMINOPHEN 500 MG
975 TABLET ORAL ONCE
Refills: 0 | Status: COMPLETED | OUTPATIENT
Start: 2019-09-01 | End: 2019-09-01

## 2019-09-01 RX ORDER — MECLIZINE HCL 12.5 MG
1 TABLET ORAL
Qty: 15 | Refills: 0
Start: 2019-09-01 | End: 2019-09-05

## 2019-09-01 RX ORDER — MECLIZINE HCL 12.5 MG
25 TABLET ORAL ONCE
Refills: 0 | Status: COMPLETED | OUTPATIENT
Start: 2019-09-01 | End: 2019-09-01

## 2019-09-01 RX ADMIN — Medication 975 MILLIGRAM(S): at 22:43

## 2019-09-01 RX ADMIN — Medication 25 MILLIGRAM(S): at 22:42

## 2019-09-01 NOTE — ED PROVIDER NOTE - NSFOLLOWUPCLINICS_GEN_ALL_ED_FT
[facility] - ENT  Otolaryngology (ENT)  [address] [place] [address]  Phone: [phone]  Fax:   Follow Up Time:

## 2019-09-01 NOTE — ED PROVIDER NOTE - NSFOLLOWUPINSTRUCTIONS_ED_ALL_ED_FT
Follow up with your Primary Care Physician within the next 2-3 days  Bring a copy of your test results with you to your appointment  Continue your current medication regimen  Return to the Emergency Room if you experience new or worsening symptoms  Take Meclizine 3x per day as needed for dizziness - it can make you drowsy  Follow up with an ENT doctor regarding your dizziness

## 2019-09-01 NOTE — ED PROVIDER NOTE - CRANIAL NERVE AND PUPILLARY EXAM
tongue is midline/horizontal fatigable nystagmus to the left/cranial nerves 2-12 intact/extra-ocular movements intact

## 2019-09-01 NOTE — ED ADULT NURSE NOTE - OBJECTIVE STATEMENT
no note from previous RN 69 year old male presented to ED with c/o of dizziness and right eye swelling. pt denies CP, SOB, nausea/vomiting, numbness/tingling, fever, cough, chills, headache, blurred vision, neuro intact. pt a&ox3, lung sounds clear, heart rate regular, abdomen soft nontender nondistended to palp. skin intact. IV in right AC 20G and patent. pt currently resting in bed comfortably with family at bedside. Side rails up for safety. Will continue to monitor and assess while offering support and reassurance.

## 2019-09-01 NOTE — ED PROVIDER NOTE - PROGRESS NOTE DETAILS
Attending note (Holger): patient reassessed, reports feeling much better, dizziness resolved; ambulating without assistance or apparent difficulty; labs unremarkable; will dc, as c/w peripheral vertigo / bpv; dc on meclizine and f/u with pcp and ENT.  Results of ED evaluation including labs d/w patient, who verbalizes understanding of ED evaluation and discharge plan including medications, follow-up instructions and return precautions.

## 2019-09-01 NOTE — ED PROVIDER NOTE - PATIENT PORTAL LINK FT
You can access the FollowMyHealth Patient Portal offered by [facility] by registering at the following website: [email or web address]. By joining [facility]’s FollowMyHealth portal, you will also be able to view your health information using other applications (apps) compatible with our system.

## 2019-09-01 NOTE — ED PROVIDER NOTE - EYES, MLM
Clear bilaterally, pupils equal, round and reactive to light. horizontal fatigable nystagmus to the left

## 2019-09-01 NOTE — ED PROVIDER NOTE - ATTENDING CONTRIBUTION TO CARE
70y/o M with h/o CAD, HTN, HLD, GERD, presenting with intermittent dizziness for past few days, associated with movement, change in positions and improving with rest/staying still.  No fever/chills, +nausea, no vomiting.  No HA.  No vision changes.  No weakness/numbness.  Some difficulty walking when having dizziness but otherwise not feeling unsteady.    On Physical Exam:  General: well appearing, in NAD, speaking clearly in full sentences and without difficulty; cooperative with exam  HEENT: PERRL, MMM  Neck: no neck tenderness, no nuchal rigidity  Cardiac: normal s1, s2; RRR; no MGR  Lungs: CTABL  Abdomen: soft nontender/nondistended  : no bladder tenderness or distension  Skin: intact, no rash  Extremities: no peripheral edema, no gross deformities  Neuro: no gross neurologic deficits; EOMI w/ rightward nystagmus noted that is extinguishable, and brought on by movement of head; + dicks hallpike to the right; HINTS exam c/w peripheral vertigo.  Normal gait.      AP: HPI and PE are c/w benign positional vertigo.  Will treat with meclizine, and reassess, if improving, can dc with meclizine and f/u with PCP, ENT as outpatient.

## 2019-09-01 NOTE — ED ADULT NURSE NOTE - CHPI ED NUR SYMPTOMS NEG
no vomiting/no pain/no tingling/no chills/no weakness/no nausea/no decreased eating/drinking/no fever

## 2019-09-01 NOTE — ED PROVIDER NOTE - SKIN, MLM
Skin normal color for race, warm, dry and intact. patient with dry erythematous patches over the forhead and eyelids bilaterally

## 2019-09-01 NOTE — ED PROVIDER NOTE - TOBACCO USE
After Visit Summary   11/16/2017    Sophia Maynard    MRN: [id number]           Patient Information     Date Of Birth          1983        Visit Information        Provider Department      11/16/2017 10:30 AM Merlin Henry APRN CNM [facility] [place]        Today's Diagnoses     Routine postpartum follow-up    -  1       Follow-ups after your visit        Who to contact     If you have questions or need follow up information about today's clinic visit or your schedule please contact [facility] directly at [phone].  Normal or non-critical lab and imaging results will be communicated to you by [employer], letter or phone within 4 business days after the clinic has received the results. If you do not hear from us within 7 days, please contact the clinic through "[employer]t or phone. If you have a critical or abnormal lab result, we will notify you by phone as soon as possible.  Submit refill requests through [employer] or call your pharmacy and they will forward the refill request to us. Please allow 3 business days for your refill to be completed.          Additional Information About Your Visit        MyChart Information     [employer] gives you secure access to your electronic health record. If you see a primary care provider, you can also send messages to your care team and make appointments. If you have questions, please call your primary care clinic.  If you do not have a primary care provider, please call [phone] and they will assist you.        Care EveryWhere ID     This is your Care EveryWhere ID. This could be used by other organizations to access your [place] medical records  [id number]        Your Vitals Were     Pulse Temperature Last Period Pulse Oximetry BMI (Body Mass Index)       76 97  F (36.1  C) (Oral) 12/22/2016 98% 25.36 kg/m2        Blood Pressure from Last 3 Encounters:   11/16/17 112/76   10/08/17 133/81   09/29/17 110/76    Weight from Last 3  Encounters:   11/16/17 155 lb (70.3 kg)   10/06/17 179 lb (81.2 kg)   09/29/17 179 lb (81.2 kg)              Today, you had the following     No orders found for display       Primary Care Provider Office Phone # Fax #    Alexander Muller [phone]       [address] [address]        Equal Access to Services     DICK CASAS : Hadii aad ku hadasho Soomaali, waaxda luqadaha, qaybta kaalmada adeegyada, waxay idiin hayaan adeeg joshktdejan juarez . So [facility] [phone].    ATENCIÓN: Si habla español, tiene a gomez disposición servicios gratuitos de asistencia lingüística. Llame al [phone].    We comply with applicable federal civil rights laws and [place] laws. We do not discriminate on the basis of race, color, national origin, age, disability, sex, sexual orientation, or gender identity.            Thank you!     Thank you for [place] [facility]  [place]. Our goal is always to provide you with excellent care. Hearing back from our patients is one way we can continue to improve our services. Please take a few minutes to complete the written survey that you may receive in the mail after your visit with us. Thank you!             Your Updated Medication List - Protect others around you: Learn how to safely use, store and throw away your medicines at www.disposemymeds.org.          This list is accurate as of: 11/16/17 11:59 PM.  Always use your most recent med list.                   Brand Name Dispense Instructions for use Diagnosis    order for DME     1 Device    Bilateral electric breast pump as covered by insurance  Fax: [phone]    Breast feeding status of mother          Unknown if ever smoked

## 2019-09-01 NOTE — ED PROVIDER NOTE - OBJECTIVE STATEMENT
70 y/o male hx of CAD, HTN, HLD who presents for a few days of dizziness. dizziness occurs when he looks in a certain direction, if he sits and rests it resolves within a few seconds. If he remains still he is asymptomatic. no fever/chills/uri sx/chest pain/sob/abd pain/n/v. no trouble speaking or walking, no weakness, numbness or tingling of any extremity. does endorse a mild bitemporal headache that developed while waiting to be seen. 68 y/o male hx of CAD, HTN, HLD who presents for a few days of dizziness. dizziness occurs when he looks in a certain direction, if he sits and rests it resolves within a few seconds. If he remains still he is asymptomatic. no fever/chills/uri sx/chest pain/sob/abd pain/n/v. no trouble speaking or walking, no weakness, numbness or tingling of any extremity. does endorse a mild bitemporal headache that developed while waiting to be seen.    Attending note (Holger): dizziness is described as room-spinning sensation, horizontal, improves after remaining still for < 1 minute.

## 2019-09-05 ENCOUNTER — APPOINTMENT (OUTPATIENT)
Dept: CARDIOLOGY | Facility: CLINIC | Age: 69
End: 2019-09-05
Payer: MEDICARE

## 2019-09-05 ENCOUNTER — NON-APPOINTMENT (OUTPATIENT)
Age: 69
End: 2019-09-05

## 2019-09-05 VITALS
DIASTOLIC BLOOD PRESSURE: 67 MMHG | SYSTOLIC BLOOD PRESSURE: 129 MMHG | HEART RATE: 72 BPM | OXYGEN SATURATION: 99 % | WEIGHT: 148 LBS | HEIGHT: 63 IN | BODY MASS INDEX: 26.22 KG/M2

## 2019-09-05 DIAGNOSIS — H81.10 BENIGN PAROXYSMAL VERTIGO, UNSPECIFIED EAR: ICD-10-CM

## 2019-09-05 PROCEDURE — 93000 ELECTROCARDIOGRAM COMPLETE: CPT

## 2019-09-05 PROCEDURE — 99214 OFFICE O/P EST MOD 30 MIN: CPT | Mod: 25

## 2019-09-05 NOTE — CARDIOLOGY SUMMARY
[No Exercise Ind Arr] : no exercise induced arrhythmias [Ant Wall Defect] : anterior wall defect [No Symptoms] : no Symptoms [Inf Wall Defect] : inferior wall defect [Fixed Defect] : fixed defect [LVEF ___%] : LVEF [unfilled]% [___] : [unfilled]

## 2019-09-05 NOTE — REASON FOR VISIT
[Follow-Up - From Hospitalization] : follow-up of a recent hospitalization for [Abnormal ECG] : an abnormal ECG [Coronary Artery Disease] : coronary artery disease [Hypertension] : hypertension [Hyperlipidemia] : hyperlipidemia [Medication Management] : Medication management [FreeTextEntry1] : \par f/u visit for CAD,stable angina

## 2019-09-05 NOTE — DISCUSSION/SUMMARY
[Coronary Artery Disease] : coronary artery disease [Hyperlipidemia] : hyperlipidemia [Stable] : stable [Diet Modification] : diet modification [Exercise] : exercise [Weight Loss] : weight loss [<70] : goal is <70 [>40] : goal is >40 [<150] : goal is <150 [Patient] : the patient [Hypertension] : hypertension [Continue] : continuing beta blockers [Sodium Restriction] : sodium restriction [FreeTextEntry1] : Patient dizziness consistent with acute benign positional vertigo , improving meclizine ,   follow up ENT \par \par Patient with CAD with multiple intervention in multiple vessel , last 2/2018 intermediate branch   no ischemia , small apical wall infarct ,  non diagnostic ekg  continue current medication ,  patient did not tolerate ACE due to cough , recommend  ARBS  he is reluctant to it , \par \par HTN   controlled , continue medication ,  low salt diet\par \par Hyperlipidemia : continue medication , target LDL <70 \par \par Abnormal EKG stable   stable  ekg , no ischemia 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

## 2019-09-05 NOTE — REVIEW OF SYSTEMS
[Fever] : no fever [Chills] : no chills [Blurry Vision] : no blurred vision [Eyeglasses] : not currently wearing eyeglasses [Earache] : no earache [Mouth Sores] : no mouth sores [Shortness Of Breath] : no shortness of breath [Chest Pain] : no chest pain [Abdominal Pain] : no abdominal pain [Cough] : no cough [Heartburn] : no heartburn [Dysphagia] : no dysphagia

## 2019-09-05 NOTE — HISTORY OF PRESENT ILLNESS
[FreeTextEntry1] : Patient  with history of hypertension, hyperlipidemia and coronary artery disease.\par  status post stent placement 2006  LCX /2009 RCA .  LAD 9/2017 10/2017 , Intermediate artery  feb 2018 , who came for follow up says he was in ER with complain that he felt dizziness went  to [facility] , patient was told to have vertigo sent home , patient was in hot tub for some time a day before , woke up in the morning with dizziness when he lifts his head in certain way , no other associated symptoms , \par patient was given meclizine ,getting better , \par \par denies any other  symptoms , patient recent blood work  glucose 163  non fasting , HbA1c 6.2 ,normal lipid profile \par \par Patient blood pressure is controlled , hyperlipidemia controlled \par \par \par  \par

## 2019-09-05 NOTE — PHYSICAL EXAM
[General Appearance - Well Developed] : well developed [Normal Appearance] : normal appearance [Well Groomed] : well groomed [General Appearance - Well Nourished] : well nourished [No Deformities] : no deformities [General Appearance - In No Acute Distress] : no acute distress [Normal Conjunctiva] : the conjunctiva exhibited no abnormalities [Eyelids - No Xanthelasma] : the eyelids demonstrated no xanthelasmas [Normal Oral Mucosa] : normal oral mucosa [No Oral Pallor] : no oral pallor [No Oral Cyanosis] : no oral cyanosis [Normal Jugular Venous A Waves Present] : normal jugular venous A waves present [Normal Jugular Venous V Waves Present] : normal jugular venous V waves present [No Jugular Venous Rosario A Waves] : no jugular venous rosario A waves [Respiration, Rhythm And Depth] : normal respiratory rhythm and effort [Exaggerated Use Of Accessory Muscles For Inspiration] : no accessory muscle use [Auscultation Breath Sounds / Voice Sounds] : lungs were clear to auscultation bilaterally [Heart Rate And Rhythm] : heart rate and rhythm were normal [Heart Sounds] : normal S1 and S2 [Abdomen Soft] : soft [Murmurs] : no murmurs present [Abdomen Tenderness] : non-tender [Abdomen Mass (___ Cm)] : no abdominal mass palpated [Abnormal Walk] : normal gait [Gait - Sufficient For Exercise Testing] : the gait was sufficient for exercise testing [Nail Clubbing] : no clubbing of the fingernails [Cyanosis, Localized] : no localized cyanosis [Petechial Hemorrhages (___cm)] : no petechial hemorrhages [Skin Color & Pigmentation] : normal skin color and pigmentation [] : no rash [No Venous Stasis] : no venous stasis [Skin Lesions] : no skin lesions [No Xanthoma] : no  xanthoma was observed [No Skin Ulcers] : no skin ulcer [Oriented To Time, Place, And Person] : oriented to person, place, and time [Mood] : the mood was normal [Affect] : the affect was normal [No Anxiety] : not feeling anxious

## 2019-09-11 NOTE — ED CDU PROVIDER NOTE - MUSCULOSKELETAL, MLM
Spine appears normal, range of motion is not limited, no muscle or joint tenderness Statement Selected

## 2020-01-16 ENCOUNTER — NON-APPOINTMENT (OUTPATIENT)
Age: 70
End: 2020-01-16

## 2020-01-16 ENCOUNTER — APPOINTMENT (OUTPATIENT)
Dept: CARDIOLOGY | Facility: CLINIC | Age: 70
End: 2020-01-16
Payer: MEDICARE

## 2020-01-16 VITALS — WEIGHT: 146 LBS | BODY MASS INDEX: 25.87 KG/M2 | HEIGHT: 63 IN

## 2020-01-16 VITALS — HEART RATE: 66 BPM | DIASTOLIC BLOOD PRESSURE: 66 MMHG | SYSTOLIC BLOOD PRESSURE: 123 MMHG | OXYGEN SATURATION: 99 %

## 2020-01-16 VITALS
HEIGHT: 63 IN | SYSTOLIC BLOOD PRESSURE: 120 MMHG | RESPIRATION RATE: 15 BRPM | BODY MASS INDEX: 25.87 KG/M2 | HEART RATE: 66 BPM | OXYGEN SATURATION: 99 % | DIASTOLIC BLOOD PRESSURE: 64 MMHG | WEIGHT: 146 LBS

## 2020-01-16 DIAGNOSIS — I25.2 OLD MYOCARDIAL INFARCTION: ICD-10-CM

## 2020-01-16 PROCEDURE — 93000 ELECTROCARDIOGRAM COMPLETE: CPT

## 2020-01-16 PROCEDURE — 99214 OFFICE O/P EST MOD 30 MIN: CPT

## 2020-01-16 NOTE — DISCUSSION/SUMMARY
[Coronary Artery Disease] : coronary artery disease [Hyperlipidemia] : hyperlipidemia [Stable] : stable [Exercise] : exercise [Diet Modification] : diet modification [<70] : goal is <70 [Weight Loss] : weight loss [>40] : goal is >40 [<150] : goal is <150 [Hypertension] : hypertension [Patient] : the patient [Sodium Restriction] : sodium restriction [Continue] : continuing beta blockers [FreeTextEntry1] : Patient dizziness consistent with acute benign positional vertigo , almost resolved  \par \par Patient with CAD with multiple intervention in multiple vessel , last 2/2018 intermediate branch   no ischemia , small apical wall infarct ,  non diagnostic ekg  continue current medication ,  patient did not tolerate ACE due to cough , recommend  ARBS  he is reluctant to it , \par \par HTN   controlled , continue medication ,  low salt diet\par \par Hyperlipidemia : continue medication , target LDL <70 \par \par Abnormal EKG stable   stable  ekg , no ischemia 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

## 2020-01-16 NOTE — CARDIOLOGY SUMMARY
Case Complete  -Instructions given verbally 8/12/19 as well as mailed   -Patient aware of PAT needs  -No Auth or clearances required [No Symptoms] : no Symptoms [No Exercise Ind Arr] : no exercise induced arrhythmias [Ant Wall Defect] : anterior wall defect [Fixed Defect] : fixed defect [Inf Wall Defect] : inferior wall defect [LVEF ___%] : LVEF [unfilled]% [___] : [unfilled]

## 2020-01-16 NOTE — REASON FOR VISIT
[Follow-Up - Clinic] : a clinic follow-up of [Abnormal ECG] : an abnormal ECG [Coronary Artery Disease] : coronary artery disease [Hyperlipidemia] : hyperlipidemia [Medication Management] : Medication management [Hypertension] : hypertension [FreeTextEntry1] : \par f/u visit for CAD,stable angina

## 2020-01-16 NOTE — PHYSICAL EXAM
[General Appearance - Well Developed] : well developed [Normal Appearance] : normal appearance [Well Groomed] : well groomed [General Appearance - Well Nourished] : well nourished [No Deformities] : no deformities [General Appearance - In No Acute Distress] : no acute distress [Normal Conjunctiva] : the conjunctiva exhibited no abnormalities [Normal Oral Mucosa] : normal oral mucosa [Eyelids - No Xanthelasma] : the eyelids demonstrated no xanthelasmas [No Oral Pallor] : no oral pallor [No Oral Cyanosis] : no oral cyanosis [Normal Jugular Venous A Waves Present] : normal jugular venous A waves present [No Jugular Venous Rosario A Waves] : no jugular venous rosario A waves [Normal Jugular Venous V Waves Present] : normal jugular venous V waves present [Respiration, Rhythm And Depth] : normal respiratory rhythm and effort [Auscultation Breath Sounds / Voice Sounds] : lungs were clear to auscultation bilaterally [Heart Rate And Rhythm] : heart rate and rhythm were normal [Exaggerated Use Of Accessory Muscles For Inspiration] : no accessory muscle use [Murmurs] : no murmurs present [Heart Sounds] : normal S1 and S2 [Abdomen Tenderness] : non-tender [Abdomen Soft] : soft [Abdomen Mass (___ Cm)] : no abdominal mass palpated [Nail Clubbing] : no clubbing of the fingernails [Abnormal Walk] : normal gait [Gait - Sufficient For Exercise Testing] : the gait was sufficient for exercise testing [Cyanosis, Localized] : no localized cyanosis [Petechial Hemorrhages (___cm)] : no petechial hemorrhages [Skin Color & Pigmentation] : normal skin color and pigmentation [] : no rash [No Venous Stasis] : no venous stasis [Skin Lesions] : no skin lesions [No Skin Ulcers] : no skin ulcer [No Xanthoma] : no  xanthoma was observed [Oriented To Time, Place, And Person] : oriented to person, place, and time [Affect] : the affect was normal [Mood] : the mood was normal [No Anxiety] : not feeling anxious

## 2020-01-16 NOTE — HISTORY OF PRESENT ILLNESS
[FreeTextEntry1] : Patient  with history of hypertension, hyperlipidemia and coronary artery disease. status post stent placement 2006  LCX /2009 RCA .  LAD 9/2017 10/2017 , Intermediate artery  feb 2018 , who came for follow up says he is doing well , denies any chest pain or shortness of breath  ,  very rarely feel mild dizziness when he gets up quickly \par \par denies any other  symptoms , patient recent blood work  glucose 88  , HbA1c 6.2 ,normal lipid profile  LDL 63\par \par Patient blood pressure is controlled , hyperlipidemia controlled \par \par \par  \par

## 2020-02-14 ENCOUNTER — EMERGENCY (EMERGENCY)
Facility: HOSPITAL | Age: 70
LOS: 1 days | Discharge: HOME | End: 2020-02-14
Attending: EMERGENCY MEDICINE
Payer: COMMERCIAL

## 2020-02-14 VITALS
RESPIRATION RATE: 14 BRPM | TEMPERATURE: 98 F | HEART RATE: 60 BPM | OXYGEN SATURATION: 100 % | DIASTOLIC BLOOD PRESSURE: 79 MMHG | SYSTOLIC BLOOD PRESSURE: 162 MMHG

## 2020-02-14 VITALS
OXYGEN SATURATION: 98 % | SYSTOLIC BLOOD PRESSURE: 171 MMHG | WEIGHT: 143.96 LBS | HEART RATE: 70 BPM | RESPIRATION RATE: 20 BRPM | TEMPERATURE: 98 F | DIASTOLIC BLOOD PRESSURE: 93 MMHG | HEIGHT: 66 IN

## 2020-02-14 DIAGNOSIS — Z98.890 OTHER SPECIFIED POSTPROCEDURAL STATES: Chronic | ICD-10-CM

## 2020-02-14 DIAGNOSIS — Z95.9 PRESENCE OF CARDIAC AND VASCULAR IMPLANT AND GRAFT, UNSPECIFIED: Chronic | ICD-10-CM

## 2020-02-14 DIAGNOSIS — Z98.49 CATARACT EXTRACTION STATUS, UNSPECIFIED EYE: Chronic | ICD-10-CM

## 2020-02-14 LAB
ALBUMIN SERPL ELPH-MCNC: 4.4 G/DL — SIGNIFICANT CHANGE UP (ref 3.3–5)
ALP SERPL-CCNC: 55 U/L — SIGNIFICANT CHANGE UP (ref 40–120)
ALT FLD-CCNC: 21 U/L — SIGNIFICANT CHANGE UP (ref 10–45)
ANION GAP SERPL CALC-SCNC: 12 MMOL/L — SIGNIFICANT CHANGE UP (ref 5–17)
AST SERPL-CCNC: 16 U/L — SIGNIFICANT CHANGE UP (ref 10–40)
BILIRUB SERPL-MCNC: 0.5 MG/DL — SIGNIFICANT CHANGE UP (ref 0.2–1.2)
BUN SERPL-MCNC: 12 MG/DL — SIGNIFICANT CHANGE UP (ref 7–23)
CALCIUM SERPL-MCNC: 10.1 MG/DL — SIGNIFICANT CHANGE UP (ref 8.4–10.5)
CHLORIDE SERPL-SCNC: 101 MMOL/L — SIGNIFICANT CHANGE UP (ref 96–108)
CO2 SERPL-SCNC: 24 MMOL/L — SIGNIFICANT CHANGE UP (ref 22–31)
CREAT SERPL-MCNC: 0.64 MG/DL — SIGNIFICANT CHANGE UP (ref 0.5–1.3)
GLUCOSE SERPL-MCNC: 104 MG/DL — HIGH (ref 70–99)
HCT VFR BLD CALC: 46.8 % — SIGNIFICANT CHANGE UP (ref 39–50)
HGB BLD-MCNC: 15.1 G/DL — SIGNIFICANT CHANGE UP (ref 13–17)
MCHC RBC-ENTMCNC: 27.8 PG — SIGNIFICANT CHANGE UP (ref 27–34)
MCHC RBC-ENTMCNC: 32.3 GM/DL — SIGNIFICANT CHANGE UP (ref 32–36)
MCV RBC AUTO: 86.2 FL — SIGNIFICANT CHANGE UP (ref 80–100)
NRBC # BLD: 0 /100 WBCS — SIGNIFICANT CHANGE UP (ref 0–0)
NT-PROBNP SERPL-SCNC: 38 PG/ML — SIGNIFICANT CHANGE UP (ref 0–300)
PLATELET # BLD AUTO: 220 K/UL — SIGNIFICANT CHANGE UP (ref 150–400)
POTASSIUM SERPL-MCNC: 4.2 MMOL/L — SIGNIFICANT CHANGE UP (ref 3.5–5.3)
POTASSIUM SERPL-SCNC: 4.2 MMOL/L — SIGNIFICANT CHANGE UP (ref 3.5–5.3)
PROT SERPL-MCNC: 7.5 G/DL — SIGNIFICANT CHANGE UP (ref 6–8.3)
RBC # BLD: 5.43 M/UL — SIGNIFICANT CHANGE UP (ref 4.2–5.8)
RBC # FLD: 13.1 % — SIGNIFICANT CHANGE UP (ref 10.3–14.5)
SODIUM SERPL-SCNC: 137 MMOL/L — SIGNIFICANT CHANGE UP (ref 135–145)
TROPONIN T, HIGH SENSITIVITY RESULT: 7 NG/L — SIGNIFICANT CHANGE UP (ref 0–51)
TROPONIN T, HIGH SENSITIVITY RESULT: 8 NG/L — SIGNIFICANT CHANGE UP (ref 0–51)
WBC # BLD: 8.04 K/UL — SIGNIFICANT CHANGE UP (ref 3.8–10.5)
WBC # FLD AUTO: 8.04 K/UL — SIGNIFICANT CHANGE UP (ref 3.8–10.5)

## 2020-02-14 PROCEDURE — 99285 EMERGENCY DEPT VISIT HI MDM: CPT | Mod: GC

## 2020-02-14 PROCEDURE — 99285 EMERGENCY DEPT VISIT HI MDM: CPT

## 2020-02-14 PROCEDURE — 83880 ASSAY OF NATRIURETIC PEPTIDE: CPT

## 2020-02-14 PROCEDURE — 80053 COMPREHEN METABOLIC PANEL: CPT

## 2020-02-14 PROCEDURE — 96374 THER/PROPH/DIAG INJ IV PUSH: CPT

## 2020-02-14 PROCEDURE — 99284 EMERGENCY DEPT VISIT MOD MDM: CPT | Mod: 25

## 2020-02-14 PROCEDURE — 85027 COMPLETE CBC AUTOMATED: CPT

## 2020-02-14 PROCEDURE — 71045 X-RAY EXAM CHEST 1 VIEW: CPT

## 2020-02-14 PROCEDURE — 93005 ELECTROCARDIOGRAM TRACING: CPT

## 2020-02-14 PROCEDURE — 71045 X-RAY EXAM CHEST 1 VIEW: CPT | Mod: 26

## 2020-02-14 PROCEDURE — 84484 ASSAY OF TROPONIN QUANT: CPT

## 2020-02-14 RX ORDER — FAMOTIDINE 10 MG/ML
20 INJECTION INTRAVENOUS ONCE
Refills: 0 | Status: COMPLETED | OUTPATIENT
Start: 2020-02-14 | End: 2020-02-14

## 2020-02-14 RX ADMIN — FAMOTIDINE 20 MILLIGRAM(S): 10 INJECTION INTRAVENOUS at 15:12

## 2020-02-14 RX ADMIN — Medication 30 MILLILITER(S): at 15:13

## 2020-02-14 NOTE — ED PROVIDER NOTE - PROGRESS NOTE DETAILS
dw cards fellow - will see pt. -Mati Robison MD- pt cleared by cards, felt better after treatments. will dc - pt advised return precaution / findings / plan. will see his private cardiologist monday or tuesday.

## 2020-02-14 NOTE — CONSULT NOTE ADULT - SUBJECTIVE AND OBJECTIVE BOX
Patient seen and evaluated at bedside    Chief Complaint:    HPI:  Pt is a 70 year old man with a PMHx of CAD/MI s/p multiple PCIs (HOLLY to mCX 2006, HOLLY to pRCA 2009, HOLLY to pLAD 2017, HOLLY to R1 2017 w/ in-stent restenosis s/p laser atherectomy HOLLY 2018), HLD, HTN, CAD, GERD p/w     In the ED, VSSWL. Labs wnl. HsTrop 8. BNP 38. Serial EKGs w/ NSR, LAD, non-specific intra-ventricular conduction delay, unchanged from prior.         PMH:   H/O heart artery stent  HTN (hypertension)  Non-ST elevation (NSTEMI) myocardial infarction  GERD without esophagitis  Coronary artery disease of native artery of native heart with stable angina pectoris  Hyperlipidemia, unspecified hyperlipidemia type      PSH:   History of colonoscopy  Status post cataract extraction and insertion of intraocular lens, unspecified laterality  S/P angioplasty with stent      Medications:   Home Medications:  isosorbide mononitrate 30 mg oral tablet, extended release: 1 tab(s) orally once a day (in the morning) (12 Feb 2018 13:03)  metoprolol succinate 50 mg oral tablet, extended release: 1 tab(s) orally once a day (12 Feb 2018 13:03)  rosuvastatin 10 mg oral tablet: 1 tab(s) orally once a day (at bedtime) (12 Feb 2018 13:03)        Allergies:  No Known Allergies      FAMILY HISTORY:  Family history of ischemic heart disease (Sibling): Brother @ 73 CABG and 68 with stents  Sister @58 with stents    (Brother CABG @73; Brother Stents @ 68 and sister Stents @58)    Social History:  Smoking History:  Alcohol Use:  Drug Use:    Review of Systems:  REVIEW OF SYSTEMS:  CONSTITUTIONAL: No weakness, fevers or chills  EYES/ENT: No visual changes;  No dysphagia  NECK: No pain or stiffness  RESPIRATORY: No cough, wheezing, hemoptysis; No shortness of breath  CARDIOVASCULAR: No chest pain or palpitations; No lower extremity edema  GASTROINTESTINAL: No abdominal or epigastric pain. No nausea, vomiting, or hematemesis; No diarrhea or constipation. No melena or hematochezia.  BACK: No back pain  GENITOURINARY: No dysuria, frequency or hematuria  NEUROLOGICAL: No numbness or weakness  SKIN: No itching, burning, rashes, or lesions   All other review of systems is negative unless indicated above.    Physical Exam:  T(F): 98.1 (02-14), Max: 98.1 (02-14)  HR: 68 (02-14) (68 - 70)  BP: 141/72 (02-14) (141/72 - 171/93)  RR: 18 (02-14)  SpO2: 98% (02-14)  GENERAL: No acute distress, well-developed  HEAD:  Atraumatic, Normocephalic  ENT: EOMI, PERRLA, conjunctiva and sclera clear, Neck supple, No JVD, moist mucosa  CHEST/LUNG: Clear to auscultation bilaterally; No wheeze, equal breath sounds bilaterally   BACK: No spinal tenderness  HEART: Regular rate and rhythm; No murmurs, rubs, or gallops  ABDOMEN: Soft, Nontender, Nondistended; Bowel sounds present  EXTREMITIES:  No clubbing, cyanosis, or edema  PSYCH: Nl behavior, nl affect  NEUROLOGY: AAOx3, non-focal, cranial nerves intact  SKIN: Normal color, No rashes or lesions  LINES:    Cardiovascular Diagnostic Testing:    ECG: Personally reviewed  2/14/20: NSR, LAD, non-specific intra-ventricular conduction delay    Echo:    Stress Testing:  IMPRESSIONS:Abnormal Study  * Chest Pain: No chest pain with administration of  Regadenoson.  * Symptom: Warm sensation, flushing, abdominal discomfort.  * HR Response: Appropriate.  * BP Response: Appropriate.  * Heart Rhythm: Sinus Rhythm - 66 BPM.  * Conduction defects: Left bundle branch block, Left axis  deviation.  * ECG Abnormalities: ECG changes could not be interpreted  due to bundle branch block.  * Arrhythmia: None.  * The left ventricle was normal in size. There are  medium-sized, mild to moderate defects in the mid to  distal inferolateral and apical lateral walls that are  mostly reversible suggestive of ischemia with partial  scarring.  * There are large, mild to moderate defects in the  inferior, inferoseptal, and inferoapical walls that are  mostly fixed suggestive of infarct with mild rl-infarct  ischemia.  * Post-stress gated wall motion analysis was performed  (LVEF = 56 %;LVEDV = 107 ml.) revealing reduced systolic  thickening of the inferior, inferoapical, apical lateral,  distal inferolateral, and basal septal walls with  preserved overall left ventricular ejection fraction.  ------------------------------------------------------------------------  Confirmed on  10/17/2017 - 15:26:42 by Cm Liang M.D.  ------------------------------------------------------------------------    Cath:  [facility] 2/12/18  VENTRICLES: Global left ventricular function was normal. EF estimated was  60 %.  CORONARY VESSELS: The coronary circulation is right dominant.  LM:   --  LM: Normal.  LAD:   --  Mid LAD: There was a 30 % stenosis.  CX:   --  Circumflex: Normal.  RI:   --  Ramus intermedius: There was a 95 % stenosis   RCA:   --  Proximal RCA: There was a 40 % stenosis.    Interpretation of Telemetry:    Imaging:  CXR PA/LAT 2/14/2020      FINDINGS:  The cardiac silhouette is normal in size. There are no focal consolidations or pleural effusions. The hilar and mediastinal structures appear unremarkable. The osseous structures are intact.    IMPRESSION: Clear lungs    Labs: Personally reviewed                        15.1   8.04  )-----------( 220      ( 14 Feb 2020 11:41 )             46.8     02-14    137  |  101  |  12  ----------------------------<  104<H>  4.2   |  24  |  0.64    Ca    10.1      14 Feb 2020 11:41    TPro  7.5  /  Alb  4.4  /  TBili  0.5  /  DBili  x   /  AST  16  /  ALT  21  /  AlkPhos  55  02-14          Serum Pro-Brain Natriuretic Peptide: 38 pg/mL (02-14 @ 11:41) Patient seen and evaluated at bedside    Chief Complaint: Burning/pressure in chest    HPI:  Pt is a 70 year old man with a PMHx of CAD/NSTEMI 2006 s/p HOLLY to mCX, s/p multiple PCIs since then for +stress/stable angina (HOLLY to mCX 2006, HOLLY to pRCA 2009, HOLLY to pLAD 2017, HOLLY to R1 2017 w/ in-stent restenosis s/p laser atherectomy HOLLY 2018), HLD, HTN, CAD, GERD p/w 1 week of burning/pressure in chest w/o radiation. Symptoms usually occur after meals, and feel similar to sx of GERD he has had in the past. He has not required acid suppressant for several years. No associated diaphoresis, SOB, palpitations, GARCIA, orthopnea, PND, LE edema. He also endorses intermittent episodes dizziness for the past 3 days, usually when he gets up from bed in the morning or when he moves his head to the side. No recent fevers, chills, URI type sx, GI sx, travel. He has been compliant with his medications including DAPT/statin.     In the ED, VSSWL. Labs wnl. HsTrop 8. BNP 38. Serial EKGs w/ NSR, LAD, non-specific intra-ventricular conduction delay, unchanged from prior.  CXR w/ clear lungs.    PMH:   H/O heart artery stent  HTN (hypertension)  Non-ST elevation (NSTEMI) myocardial infarction  GERD without esophagitis  Coronary artery disease of native artery of native heart with stable angina pectoris  Hyperlipidemia, unspecified hyperlipidemia type      PSH:   History of colonoscopy  Status post cataract extraction and insertion of intraocular lens, unspecified laterality  S/P angioplasty with stent      Medications:   Home Medications:  metoprolol succinate 50 mg daily  rosuvastatin 10 mg daily  asa 81 mg daily  plavix 75 mg daily      Allergies:  No Known Allergies      FAMILY HISTORY:  Brother w/ CABG at age 73  Brother w/ CABG at age 73  Brother w/ stents at age 68  Sister w/ stents at age 58        Social History: Retired, lives w/ wife  Smoking History: Denies  Alcohol Use: Social  Drug Use: Denies    Review of Systems:  REVIEW OF SYSTEMS:  CONSTITUTIONAL: No weakness, fevers or chills  EYES/ENT: No visual changes;  No dysphagia  NECK: No pain or stiffness  RESPIRATORY: No cough, wheezing, hemoptysis; No shortness of breath  CARDIOVASCULAR: +Chest pressure. No palpitations; No lower extremity edema  GASTROINTESTINAL: +Burning. No nausea, vomiting, or hematemesis; No diarrhea or constipation. No melena or hematochezia.  BACK: No back pain  GENITOURINARY: No dysuria, frequency or hematuria  NEUROLOGICAL: +Intermittent dizziness  SKIN: No itching, burning, rashes, or lesions   All other review of systems is negative unless indicated above.    Physical Exam:  T(F): 98.1 (02-14), Max: 98.1 (02-14)  HR: 68 (02-14) (68 - 70)  BP: 141/72 (02-14) (141/72 - 171/93)  RR: 18 (02-14)  SpO2: 98% (02-14)  GENERAL: No acute distress, well-developed  HEAD:  Atraumatic, Normocephalic  ENT: EOMI, PERRLA, conjunctiva and sclera clear, Neck supple, moist mucosa  CHEST/LUNG: Clear to auscultation bilaterally; No wheeze, equal breath sounds bilaterally   HEART: Regular rate and rhythm; No murmurs, rubs, or gallops, no JVD  ABDOMEN: Soft, Nontender, Nondistended; Bowel sounds present  EXTREMITIES:  No clubbing, cyanosis, or edema  PSYCH: Nl behavior, nl affect  NEUROLOGY: AAOx3, non-focal, cranial nerves intact  SKIN: Normal color, No rashes or lesions      Cardiovascular Diagnostic Testing:    ECG: Personally reviewed  2/14/20: NSR, LAD, non-specific intra-ventricular conduction delay      NST [id number]:  IMPRESSIONS: Abnormal Study  * Chest Pain: No chest pain with administration of  Regadenoson.  * Symptom: Warm sensation, flushing, abdominal discomfort.  * HR Response: Appropriate.  * BP Response: Appropriate.  * Heart Rhythm: Sinus Rhythm - 66 BPM.  * Conduction defects: Left bundle branch block, Left axis  deviation.  * ECG Abnormalities: ECG changes could not be interpreted  due to bundle branch block.  * Arrhythmia: None.  * The left ventricle was normal in size. There are  medium-sized, mild to moderate defects in the mid to  distal inferolateral and apical lateral walls that are  mostly reversible suggestive of ischemia with partial  scarring.  * There are large, mild to moderate defects in the  inferior, inferoseptal, and inferoapical walls that are  mostly fixed suggestive of infarct with mild rl-infarct  ischemia.  * Post-stress gated wall motion analysis was performed  (LVEF = 56 %;LVEDV = 107 ml.) revealing reduced systolic  thickening of the inferior, inferoapical, apical lateral,  distal inferolateral, and basal septal walls with  preserved overall left ventricular ejection fraction.    Cath:  [facility] 2/12/18  VENTRICLES: Global left ventricular function was normal. EF estimated was  60 %.  CORONARY VESSELS: The coronary circulation is right dominant.  LM:   --  LM: Normal.  LAD:   --  Mid LAD: There was a 30 % stenosis.  CX:   --  Circumflex: Normal.  RI:   --  Ramus intermedius: There was a 95 % stenosis   RCA:   --  Proximal RCA: There was a 40 % stenosis.    [facility] 10/17/17:  LM:   --  LM: Normal.  LAD:   --  D1: There was a60 % stenosis.  CX:   --  Distal circumflex: There was a 40 % stenosis.  RI:   --  Ramus intermedius: There was a 80 % stenosis.  RCA:   --  Distal RCA: There was a 30 % stenosis.    [facility] 9/14/17:  LM:   --  LM: Normal.  LAD:   --  Proximal LAD: There was a 80 % stenosis.  CX:   --  Circumflex: Normal.  RCA:   --  Proximal RCA: There was a 40 % stenosis.    [facility] 2/21/09:  LM:      LM: The vessel was medium to large sized. Angiography showed mild  atherosclerosis with no flow limiting lesions.  LAD:      Mid LAD: There was a discrete 40 % stenosis.  CX:      Mid circumflex: There was a discrete 0 % stenosis at the site of a  prior stent, stent type taxus.  RCA:      Proximal RCA: There was a discrete 85 % stenosis.    Interpretation of Telemetry:    Imaging:  CXR PA/LAT 2/14/2020      FINDINGS:  The cardiac silhouette is normal in size. There are no focal consolidations or pleural effusions. The hilar and mediastinal structures appear unremarkable. The osseous structures are intact.    IMPRESSION: Clear lungs    Labs: Personally reviewed                        15.1   8.04  )-----------( 220      ( 14 Feb 2020 11:41 )             46.8     02-14    137  |  101  |  12  ----------------------------<  104<H>  4.2   |  24  |  0.64    Ca    10.1      14 Feb 2020 11:41    TPro  7.5  /  Alb  4.4  /  TBili  0.5  /  DBili  x   /  AST  16  /  ALT  21  /  AlkPhos  55  02-14          Serum Pro-Brain Natriuretic Peptide: 38 pg/mL (02-14 @ 11:41)

## 2020-02-14 NOTE — ED PROVIDER NOTE - CLINICAL SUMMARY MEDICAL DECISION MAKING FREE TEXT BOX
69 yo M with significant cardiac risk factors, pw chest pain 1 week and dizziness 2 days. Possible ACS given prior history. Possible Gerd, gastritis. ED chest pain workup, possible cards consult for Echo/cath.

## 2020-02-14 NOTE — ED ADULT NURSE NOTE - NS ED NOTE ABUSE SUSPICION NEGLECT YN
EPWORTH SLEEPINESS SCALE - PATIENT RESPONSES    Situation             Response  Sitting and reading 1   Watching TV 0   Sitting inactive in a public place (e.g. A theatre or a meeting) 0   As a passenger in a car for an hour without a break 0   Lying down to rest in the afternoon when circumstances permit 0   Sitting and talking to someone 0   Sitting quietly after lunch without alcohol 0   In a car while stopped for a few minutes in traffic 0   Total score 1        No

## 2020-02-14 NOTE — ED PROVIDER NOTE - OBJECTIVE STATEMENT
71 yo M pmhx of CAD MI sp 6 stents, HTN, HLD, GERD, pw 1 week midline retrosternal chest pressure. CP is worse with eating, described as burning and chest heaviness, non pleuritic, non reproducible, no radiation, no SOB, no nausea, vomiting, diaphoresis. Pt endorse mild lightheadedness for past 2 days, denies fever chills, GI or  symptoms.

## 2020-02-14 NOTE — ED PROVIDER NOTE - PHYSICAL EXAMINATION
Gen: well appearing, of stated age, no acute distress; Head: NC, AT; ENT: MMM, no uvular deviation; Neck: supple with full ROM; Chest: CTAB, no retractions, rate normal, appears to breath comfortable; Heart: RRR S1S2 No JVD No peripheral edema b/l pulses 2+ in arms ; Abd: Soft non-tender, no rebound or guarding, no masses, no ferris sign, no mcburney tenderness; Back: No spinal deformity; Ext: Moving all 4 limbs without obvious impairment to ROM, no obvious weakness; Neuro: fluid speech, no focal deficits, oriented to person, place, situation; Psych: No anxiety, depression or pressured speech noted; Skin: no utricaria, no diffuse rash. -ncohen

## 2020-02-14 NOTE — CONSULT NOTE ADULT - ATTENDING COMMENTS
70 year old man with coronary stents has had few days of intermittent burning chest pain, both sides and dizziness. Symptoms are different than at time of stents. First troponin 8. EKG unchanged from September 2019. Await second troponin and if remains down near lowest detectable level can discharge to home for outpatient follow up.

## 2020-02-14 NOTE — ED ADULT NURSE NOTE - OBJECTIVE STATEMENT
70y m pt arrived in ed with wife; pt states was sent by pcp; pt c/o chest pressure since Monday; pt has hx of cardiac stents; pt denies sob/yost; pt also c/o pain to right shoulder for months; pt has been following up with md regarding 70y m pt arrived in ed with wife; pt states was sent by pcp; pt c/o chest pressure since Monday; pt has hx of cardiac stents; pt denies sob/yost; pt also c/o pain to right shoulder for months; pt has been following up with md with steroid shots; pt aox3; no resp distress; +dizziness that comes and goes; no vision changes; no abd pain; no n/v/d; denies fever/chills; no cough/congestion; denies le swelling; pt states feels dizzy when stands or sits up; ekg done; pt placed on cardiac monitor in nsr; iv placed; labs drawn; family at bedside; safety/comfort maintained

## 2020-02-14 NOTE — CONSULT NOTE ADULT - ASSESSMENT
Pt is a 70 year old man with a PMHx of CAD/NSTEMI 2006 s/p HOLLY to mCX, s/p multiple PCIs since then for +stress/stable angina (HOLLY to mCX 2006, HOLLY to pRCA 2009, HOLLY to pLAD 2017, HOLLY to R1 2017 w/ in-stent restenosis s/p laser atherectomy HOLLY 2018), HLD, HTN, CAD, GERD p/w 1 week of chest pressure/burning.     #Chest pressure/burning: Sx more c/w GERD than angina. First HsTrop negative. Serial EKGs w/o ischemic changes. No evidence of CHF on exam  -F/u second set of cardiac enzymes  -Continue pts home asa/plavix (takes both at night), metoprolol, statin    #Dizziness: Sx c/w with BPPV  -OP neurology f/u    Ed Wright MD  Cardiology Fellow  All cardiology service information can be found 24/7 on [email or web address], password: [email or web address]

## 2020-02-14 NOTE — ED PROVIDER NOTE - PATIENT PORTAL LINK FT
You can access the FollowMyHealth Patient Portal offered by [facility] by registering at the following website: [email or web address]. By joining [facility]’s FollowMyHealth portal, you will also be able to view your health information using other applications (apps) compatible with our system.

## 2020-02-14 NOTE — ED PROVIDER NOTE - NSFOLLOWUPINSTRUCTIONS_ED_ALL_ED_FT
69 yo M with significant cardiac risk factors, pw chest pain 1 week and dizziness 2 days. Possible ACS given prior history. Possible Gerd, gastritis. ED chest pain workup, possible cards consult for Echo/cath. IMPORTANT INSTRUCTIONS FROM Dr. RANDOLPH:    Please follow up with your personal medical doctor / cardiologist in 24-48 hours.   Bring results from today to your visit.    If you were advised to take any medications - be sure to review the package insert.    If your symptoms change, get worse or if you have any new symptoms, come to the ER right away.  If you have any questions, call the ER at the phone number on this page.

## 2020-02-17 ENCOUNTER — APPOINTMENT (OUTPATIENT)
Dept: CARDIOLOGY | Facility: CLINIC | Age: 70
End: 2020-02-17
Payer: MEDICARE

## 2020-02-17 ENCOUNTER — NON-APPOINTMENT (OUTPATIENT)
Age: 70
End: 2020-02-17

## 2020-02-17 VITALS
HEART RATE: 79 BPM | DIASTOLIC BLOOD PRESSURE: 73 MMHG | WEIGHT: 147 LBS | BODY MASS INDEX: 26.05 KG/M2 | SYSTOLIC BLOOD PRESSURE: 136 MMHG | HEIGHT: 63 IN | OXYGEN SATURATION: 98 %

## 2020-02-17 PROCEDURE — 99215 OFFICE O/P EST HI 40 MIN: CPT

## 2020-02-17 PROCEDURE — 93000 ELECTROCARDIOGRAM COMPLETE: CPT

## 2020-02-17 RX ORDER — ASPIRIN 325 MG/1
325 TABLET, FILM COATED ORAL DAILY
Qty: 30 | Refills: 11 | Status: DISCONTINUED | COMMUNITY
Start: 2018-02-26 | End: 2020-02-17

## 2020-02-17 NOTE — DISCUSSION/SUMMARY
[Coronary Artery Disease] : coronary artery disease [Stable] : stable [Hyperlipidemia] : hyperlipidemia [Diet Modification] : diet modification [Weight Loss] : weight loss [Exercise] : exercise [<150] : goal is <150 [>40] : goal is >40 [<70] : goal is <70 [Hypertension] : hypertension [Patient] : the patient [Sodium Restriction] : sodium restriction [Continue] : continuing beta blockers [FreeTextEntry1] : Patient dizziness consistent with acute benign positional vertigo , almost resolved  \par \par Patient with CAD with multiple intervention in multiple vessel , last 2/2018 intermediate branch   with recent chest pain  possible GERD , possible angina , will recommend cardiac cath as patient has LBBB (baseline ) multiple stents , will give protonix 40 mg po daily \par \par HTN   controlled , continue medication ,  low salt diet\par \par Hyperlipidemia : continue medication , target LDL <70 \par \par Abnormal EKG stable   stable  ekg , no ischemia 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

## 2020-02-17 NOTE — REVIEW OF SYSTEMS
[Chills] : no chills [Fever] : no fever [Eyeglasses] : not currently wearing eyeglasses [Blurry Vision] : no blurred vision [Earache] : no earache [Chest Pain] : no chest pain [Cough] : no cough [Shortness Of Breath] : no shortness of breath [Mouth Sores] : no mouth sores [Abdominal Pain] : no abdominal pain [Heartburn] : no heartburn [Dysphagia] : no dysphagia

## 2020-02-17 NOTE — CARDIOLOGY SUMMARY
[No Exercise Ind Arr] : no exercise induced arrhythmias [Ant Wall Defect] : anterior wall defect [No Symptoms] : no Symptoms [Fixed Defect] : fixed defect [Inf Wall Defect] : inferior wall defect [LVEF ___%] : LVEF [unfilled]% [___] : [unfilled]

## 2020-02-17 NOTE — PHYSICAL EXAM
[General Appearance - Well Developed] : well developed [Normal Appearance] : normal appearance [General Appearance - Well Nourished] : well nourished [Well Groomed] : well groomed [General Appearance - In No Acute Distress] : no acute distress [No Deformities] : no deformities [Normal Conjunctiva] : the conjunctiva exhibited no abnormalities [No Oral Pallor] : no oral pallor [Normal Oral Mucosa] : normal oral mucosa [Eyelids - No Xanthelasma] : the eyelids demonstrated no xanthelasmas [No Oral Cyanosis] : no oral cyanosis [Normal Jugular Venous V Waves Present] : normal jugular venous V waves present [Normal Jugular Venous A Waves Present] : normal jugular venous A waves present [Respiration, Rhythm And Depth] : normal respiratory rhythm and effort [No Jugular Venous Rosario A Waves] : no jugular venous rosario A waves [Auscultation Breath Sounds / Voice Sounds] : lungs were clear to auscultation bilaterally [Heart Rate And Rhythm] : heart rate and rhythm were normal [Exaggerated Use Of Accessory Muscles For Inspiration] : no accessory muscle use [Murmurs] : no murmurs present [Heart Sounds] : normal S1 and S2 [Abdomen Tenderness] : non-tender [Abdomen Soft] : soft [Nail Clubbing] : no clubbing of the fingernails [Abdomen Mass (___ Cm)] : no abdominal mass palpated [Abnormal Walk] : normal gait [Gait - Sufficient For Exercise Testing] : the gait was sufficient for exercise testing [Cyanosis, Localized] : no localized cyanosis [Petechial Hemorrhages (___cm)] : no petechial hemorrhages [] : no rash [No Venous Stasis] : no venous stasis [Skin Color & Pigmentation] : normal skin color and pigmentation [No Skin Ulcers] : no skin ulcer [No Xanthoma] : no  xanthoma was observed [Skin Lesions] : no skin lesions [Oriented To Time, Place, And Person] : oriented to person, place, and time [Mood] : the mood was normal [Affect] : the affect was normal [No Anxiety] : not feeling anxious

## 2020-02-17 NOTE — REASON FOR VISIT
[Abnormal ECG] : an abnormal ECG [Chest Pain] : chest pain [Follow-Up - Clinic] : a clinic follow-up of [Hyperlipidemia] : hyperlipidemia [Coronary Artery Disease] : coronary artery disease [Medication Management] : Medication management [FreeTextEntry1] : \par f/u visit for CAD,stable angina [Hypertension] : hypertension

## 2020-02-17 NOTE — HISTORY OF PRESENT ILLNESS
[FreeTextEntry1] : Patient  with history of hypertension, hyperlipidemia and coronary artery disease. status post stent placement 2006  LCX /2009 RCA .  LAD 9/2017 10/2017 , Intermediate artery  feb 2018 , who came for follow up after having  episodes of heart burn like symptoms for 5 days last week  not related to exertion , not associated with shortness of breath , got little better with water. Patient went to [place] where he was observed , had negative troponin  , was sent home , patient denies any recurrence , \par but he felt like some discomfort in chest  yesterday when he walked too fast . no recurrence  since then \par \par \par denies any other  symptoms , patient recent blood work  glucose 88  , HbA1c 6.2 ,normal lipid profile  LDL 63\par \par Patient blood pressure is controlled , hyperlipidemia controlled \par \par \par  \par

## 2020-02-18 ENCOUNTER — INPATIENT (INPATIENT)
Facility: HOSPITAL | Age: 70
LOS: 0 days | Discharge: HOME | DRG: 247 | End: 2020-02-19
Attending: INTERNAL MEDICINE | Admitting: INTERNAL MEDICINE
Payer: COMMERCIAL

## 2020-02-18 VITALS
TEMPERATURE: 99 F | WEIGHT: 143.96 LBS | HEART RATE: 63 BPM | HEIGHT: 66 IN | OXYGEN SATURATION: 100 % | SYSTOLIC BLOOD PRESSURE: 140 MMHG | DIASTOLIC BLOOD PRESSURE: 91 MMHG

## 2020-02-18 DIAGNOSIS — Z98.890 OTHER SPECIFIED POSTPROCEDURAL STATES: Chronic | ICD-10-CM

## 2020-02-18 DIAGNOSIS — Z95.9 PRESENCE OF CARDIAC AND VASCULAR IMPLANT AND GRAFT, UNSPECIFIED: Chronic | ICD-10-CM

## 2020-02-18 DIAGNOSIS — Z98.49 CATARACT EXTRACTION STATUS, UNSPECIFIED EYE: Chronic | ICD-10-CM

## 2020-02-18 DIAGNOSIS — R94.39 ABNORMAL RESULT OF OTHER CARDIOVASCULAR FUNCTION STUDY: ICD-10-CM

## 2020-02-18 LAB
ANION GAP SERPL CALC-SCNC: 13 MMOL/L — SIGNIFICANT CHANGE UP (ref 5–17)
BUN SERPL-MCNC: 9 MG/DL — SIGNIFICANT CHANGE UP (ref 7–23)
CALCIUM SERPL-MCNC: 9.4 MG/DL — SIGNIFICANT CHANGE UP (ref 8.4–10.5)
CHLORIDE SERPL-SCNC: 103 MMOL/L — SIGNIFICANT CHANGE UP (ref 96–108)
CO2 SERPL-SCNC: 25 MMOL/L — SIGNIFICANT CHANGE UP (ref 22–31)
CREAT SERPL-MCNC: 0.63 MG/DL — SIGNIFICANT CHANGE UP (ref 0.5–1.3)
GLUCOSE SERPL-MCNC: 92 MG/DL — SIGNIFICANT CHANGE UP (ref 70–99)
HCT VFR BLD CALC: 45.9 % — SIGNIFICANT CHANGE UP (ref 39–50)
HGB BLD-MCNC: 14.9 G/DL — SIGNIFICANT CHANGE UP (ref 13–17)
MCHC RBC-ENTMCNC: 28 PG — SIGNIFICANT CHANGE UP (ref 27–34)
MCHC RBC-ENTMCNC: 32.5 GM/DL — SIGNIFICANT CHANGE UP (ref 32–36)
MCV RBC AUTO: 86.1 FL — SIGNIFICANT CHANGE UP (ref 80–100)
NRBC # BLD: 0 /100 WBCS — SIGNIFICANT CHANGE UP (ref 0–0)
PLATELET # BLD AUTO: 215 K/UL — SIGNIFICANT CHANGE UP (ref 150–400)
POTASSIUM SERPL-MCNC: 4.3 MMOL/L — SIGNIFICANT CHANGE UP (ref 3.5–5.3)
POTASSIUM SERPL-SCNC: 4.3 MMOL/L — SIGNIFICANT CHANGE UP (ref 3.5–5.3)
RBC # BLD: 5.33 M/UL — SIGNIFICANT CHANGE UP (ref 4.2–5.8)
RBC # FLD: 13.1 % — SIGNIFICANT CHANGE UP (ref 10.3–14.5)
SODIUM SERPL-SCNC: 141 MMOL/L — SIGNIFICANT CHANGE UP (ref 135–145)
WBC # BLD: 8.56 K/UL — SIGNIFICANT CHANGE UP (ref 3.8–10.5)
WBC # FLD AUTO: 8.56 K/UL — SIGNIFICANT CHANGE UP (ref 3.8–10.5)

## 2020-02-18 PROCEDURE — 93010 ELECTROCARDIOGRAM REPORT: CPT | Mod: 76

## 2020-02-18 PROCEDURE — 92928 PRQ TCAT PLMT NTRAC ST 1 LES: CPT | Mod: RC

## 2020-02-18 PROCEDURE — 99152 MOD SED SAME PHYS/QHP 5/>YRS: CPT

## 2020-02-18 PROCEDURE — 93454 CORONARY ARTERY ANGIO S&I: CPT | Mod: 26,59

## 2020-02-18 RX ORDER — ASPIRIN/CALCIUM CARB/MAGNESIUM 324 MG
81 TABLET ORAL DAILY
Refills: 0 | Status: DISCONTINUED | OUTPATIENT
Start: 2020-02-18 | End: 2020-02-19

## 2020-02-18 RX ORDER — ISOSORBIDE MONONITRATE 60 MG/1
1 TABLET, EXTENDED RELEASE ORAL
Qty: 0 | Refills: 0 | DISCHARGE

## 2020-02-18 RX ORDER — CLOPIDOGREL BISULFATE 75 MG/1
75 TABLET, FILM COATED ORAL DAILY
Refills: 0 | Status: DISCONTINUED | OUTPATIENT
Start: 2020-02-18 | End: 2020-02-19

## 2020-02-18 RX ORDER — ATORVASTATIN CALCIUM 80 MG/1
40 TABLET, FILM COATED ORAL AT BEDTIME
Refills: 0 | Status: DISCONTINUED | OUTPATIENT
Start: 2020-02-18 | End: 2020-02-19

## 2020-02-18 RX ORDER — METOPROLOL TARTRATE 50 MG
50 TABLET ORAL DAILY
Refills: 0 | Status: DISCONTINUED | OUTPATIENT
Start: 2020-02-18 | End: 2020-02-19

## 2020-02-18 RX ORDER — ROSUVASTATIN CALCIUM 5 MG/1
1 TABLET ORAL
Qty: 0 | Refills: 0 | DISCHARGE

## 2020-02-18 RX ADMIN — ATORVASTATIN CALCIUM 40 MILLIGRAM(S): 80 TABLET, FILM COATED ORAL at 21:20

## 2020-02-18 RX ADMIN — Medication 50 MILLIGRAM(S): at 21:20

## 2020-02-18 NOTE — H&P CARDIOLOGY - HISTORY OF PRESENT ILLNESS
This is a 71 yo man with history of CAD/ multiple PCI, HTN, HLD MI, presents as outpatient for cardiac cath to evaluate unstable angina over last week with chest burning on exertion with radiation to left shoulder.   Pt was recently in ER on 2/14/2020 with angina and was discharged to home.     denies implanted cardiac monitors   Dr. Palla cardiologist This is a 69 yo man with history of CAD/ multiple PCI, HTN, HLD MI, presents as outpatient for cardiac cath to evaluate unstable angina over last week with chest burning on exertion with radiation to left shoulder.   Pt was recently in ER on 2/14/2020 with angina negative troponins and was discharged to home.     denies implanted cardiac monitors   Dr. Palla cardiologist   PROCEDURE: cath 2/2018  --  Left heart catheterization with ventriculography.  --  Left coronary angiography.  --  Right coronary angiography.  --  Coronary Laser.  --  Intervention on ramus intermedius: drug-eluting stent.  TECHNIQUE: The risks and alternatives of the procedures and conscious  sedation were explained to the patient and informed consent was obtained.  Cardiac catheterization performed urgently. Coronary intervention  performed urgently.  Local anesthetic given. Right radial artery access. Left heart  catheterization. Ventriculography was performed. Left coronary artery  angiography. The vessel was injected utilizing a catheter. Right coronary  artery angiography. The vessel was injected utilizing a catheter.  RADIATION EXPOSURE: 9.5 min. A successful drug-eluting stent was performed  on the 95 % lesion in the ramus intermedius. Following intervention there  was a 1 % residual stenosis. According to the ACC/AHA classification  system, this lesion was a type B2 lesion. There was DONOVAN 3 flow after the  procedure. There was no dissection. Vessel setup was performed. A 6FR  JL3.5 LAUNCHER guiding catheter was used to intubate the vessel. Vessel  setup was performed. A NICOLA [id number] wire was used to cross the lesion.  Balloon angioplasty was performed, using a 2.00 X 15 APEX balloon, with 1  inflations and a maximum inflation pressure of 20 kobi. Laser atherectomy  was performed using a 0.9MM ELCA catheter and 0 pass(es) across the  lesion. Balloon angioplasty was performed, using a 2.50 X 15 EUPHORA NC  balloon, with 2 inflations and a maximum inflation pressure of 25 kobi. A  drug-eluting stent was placed across the lesion and deployed at a maximum  inflation pressure of 27 kobi. Balloon angioplasty was performed, with 1  inflations and a maximum inflation pressure of 30 kobi. Coronary Laser.  CONTRAST GIVEN: Omnipaque 30 ml. Omnipaque 44 ml.  MEDICATIONS GIVEN: Midazolam, 1 mg,IV. Fentanyl, 25 mcg, IV. Fentanyl, 25  mcg, IV. Verapamil (Isoptin, Calan, Covera), 2.5 mg, IA. Nitroglycerin,  100 mcg, intracoronary. Nitroglycerin, 100 mcg, intracoronary. Heparin,  3000 units, IA. Heparin, 3500 units, IV. Clopidogrel (Plavix), 75 mg, PO.  VENTRICLES: Global left ventricular function was normal. EF estimated was  60 %.  CORONARY VESSELS: The coronary circulation is right dominant.  LM:   --  LM: Normal.  LAD:   --  Mid LAD: There was a 30 % stenosis.  CX:   --  Circumflex: Normal.  RI:   --  Ramus intermedius: There was a 95 % stenosis.  RCA:   --  Proximal RCA: There was a 40 % stenosis.  COMPLICATIONS: There were no complications.  INTERVENTIONAL RECOMMENDATIONS: ASA and Plavix for 1 year  Prepared and signed by  Romario Pearson M.D.  Signed 02/12/2018 14:37:13  HEMODYNAMIC TABLES

## 2020-02-18 NOTE — PATIENT PROFILE ADULT - DOES PATIENT HAVE ADVANCE DIRECTIVE
Infant currently at gestational age of 38w 1d. Feeding time:  1700         Refer to the below scoring systems to complete:  Person bottle feeding Feeding readiness score Length of  feeding Quality Score Caregiver techniques    []Nurse       []     PT     [x] Parent       []   Other  [x]     1   []     2   []     3   []     4   []     5  []  Breast   [x]  Bottle     20 Minutes  []     1   [x]     2   []     3   []     4   []     5  [x] *n/a   []  A   []  B   []  C - Type:   (indicate nipple type if not regular nipple)       []  D   []  E   []  F       COMMENTS:      Parent present for feeding? [x] Yes        [] No                 Mode of feeding:  []   Breast        [x]   Bottle: []  Mother's Milk   [] Donor Milk        [x]  Formula                   []   NG:  []  Mother's Milk   [] Donor Milk       []  Formula    Infant Driven Feeding (IDF) protocol followed to establish and encourage positive feeding patterns, as well as promote favorable long-term outcomes for infant. INFANT DRIVEN FEEDING SCORING SYSTEM:    Feeding readiness score: Bottle or breast feed with scores of 1 or 2. Tube feeding with scores of 3,4, or 5.  1.  Alert or fussy prior to care. Rooting and and/or hands to mouth behavior. Good tone. 2. Alert once handled. Some rooting or takes pacifier. Adequate tone. 3. Briefly alert with care. No hunger behaviors (ie rooting, sucking) No change in tone. 4. Sleeping throughout care. No hunger cues. No change in tone. 5. Significant autonomic changes outside of safe parameters:  HR, RR, oxygen or work breathing. Quality score:    1. Nipples with strong coordinated suck, swallow, breathe (SSB)  2. Nipples with a strong coordinated SSB but fatigues with progression  3. Difficulty coordinating SSB despite consistent suck  4. Nipples with weak/inconsistent SSB. Little to no rhythm  5. Unable to coordinate SSB pattern.   Significant autonomic changes:  HR, RR, oxygen, work of breathing is no

## 2020-02-19 ENCOUNTER — TRANSCRIPTION ENCOUNTER (OUTPATIENT)
Age: 70
End: 2020-02-19

## 2020-02-19 VITALS
HEART RATE: 75 BPM | DIASTOLIC BLOOD PRESSURE: 68 MMHG | OXYGEN SATURATION: 97 % | SYSTOLIC BLOOD PRESSURE: 133 MMHG | RESPIRATION RATE: 18 BRPM | TEMPERATURE: 98 F

## 2020-02-19 LAB
ANION GAP SERPL CALC-SCNC: 12 MMOL/L — SIGNIFICANT CHANGE UP (ref 5–17)
BUN SERPL-MCNC: 11 MG/DL — SIGNIFICANT CHANGE UP (ref 7–23)
CALCIUM SERPL-MCNC: 9.2 MG/DL — SIGNIFICANT CHANGE UP (ref 8.4–10.5)
CHLORIDE SERPL-SCNC: 102 MMOL/L — SIGNIFICANT CHANGE UP (ref 96–108)
CO2 SERPL-SCNC: 23 MMOL/L — SIGNIFICANT CHANGE UP (ref 22–31)
CREAT SERPL-MCNC: 0.58 MG/DL — SIGNIFICANT CHANGE UP (ref 0.5–1.3)
GLUCOSE SERPL-MCNC: 109 MG/DL — HIGH (ref 70–99)
HCT VFR BLD CALC: 45.9 % — SIGNIFICANT CHANGE UP (ref 39–50)
HGB BLD-MCNC: 14.9 G/DL — SIGNIFICANT CHANGE UP (ref 13–17)
MCHC RBC-ENTMCNC: 28 PG — SIGNIFICANT CHANGE UP (ref 27–34)
MCHC RBC-ENTMCNC: 32.5 GM/DL — SIGNIFICANT CHANGE UP (ref 32–36)
MCV RBC AUTO: 86.1 FL — SIGNIFICANT CHANGE UP (ref 80–100)
NRBC # BLD: 0 /100 WBCS — SIGNIFICANT CHANGE UP (ref 0–0)
PLATELET # BLD AUTO: 213 K/UL — SIGNIFICANT CHANGE UP (ref 150–400)
POTASSIUM SERPL-MCNC: 4.3 MMOL/L — SIGNIFICANT CHANGE UP (ref 3.5–5.3)
POTASSIUM SERPL-SCNC: 4.3 MMOL/L — SIGNIFICANT CHANGE UP (ref 3.5–5.3)
RBC # BLD: 5.33 M/UL — SIGNIFICANT CHANGE UP (ref 4.2–5.8)
RBC # FLD: 13 % — SIGNIFICANT CHANGE UP (ref 10.3–14.5)
SODIUM SERPL-SCNC: 137 MMOL/L — SIGNIFICANT CHANGE UP (ref 135–145)
WBC # BLD: 9.87 K/UL — SIGNIFICANT CHANGE UP (ref 3.8–10.5)
WBC # FLD AUTO: 9.87 K/UL — SIGNIFICANT CHANGE UP (ref 3.8–10.5)

## 2020-02-19 PROCEDURE — 93454 CORONARY ARTERY ANGIO S&I: CPT | Mod: 59

## 2020-02-19 PROCEDURE — 85027 COMPLETE CBC AUTOMATED: CPT

## 2020-02-19 PROCEDURE — 99152 MOD SED SAME PHYS/QHP 5/>YRS: CPT

## 2020-02-19 PROCEDURE — 99153 MOD SED SAME PHYS/QHP EA: CPT

## 2020-02-19 PROCEDURE — C1894: CPT

## 2020-02-19 PROCEDURE — C1874: CPT

## 2020-02-19 PROCEDURE — C1725: CPT

## 2020-02-19 PROCEDURE — 99238 HOSP IP/OBS DSCHRG MGMT 30/<: CPT

## 2020-02-19 PROCEDURE — C1769: CPT

## 2020-02-19 PROCEDURE — C9600: CPT | Mod: RC

## 2020-02-19 PROCEDURE — 93005 ELECTROCARDIOGRAM TRACING: CPT

## 2020-02-19 PROCEDURE — C1887: CPT

## 2020-02-19 PROCEDURE — 80048 BASIC METABOLIC PNL TOTAL CA: CPT

## 2020-02-19 RX ADMIN — Medication 81 MILLIGRAM(S): at 08:19

## 2020-02-19 RX ADMIN — CLOPIDOGREL BISULFATE 75 MILLIGRAM(S): 75 TABLET, FILM COATED ORAL at 08:19

## 2020-02-19 NOTE — PROGRESS NOTE ADULT - SUBJECTIVE AND OBJECTIVE BOX
No events overnight. Denies CP, SOB, palp, dizziness.    MEDICATIONS:  aspirin enteric coated 81 milliGRAM(s) Oral daily  clopidogrel Tablet 75 milliGRAM(s) Oral daily  metoprolol succinate ER 50 milliGRAM(s) Oral daily  atorvastatin 40 milliGRAM(s) Oral at bedtime        REVIEW OF SYSTEMS:    CONSTITUTIONAL: No weakness, fevers or chills  EYES/ENT: No visual changes;  No dysphagia  RESPIRATORY: No cough, wheezing, hemoptysis; No shortness of breath  CARDIOVASCULAR: No chest pain or palpitations; No lower extremity edema  GASTROINTESTINAL: No abdominal or epigastric pain. No nausea, vomiting, or hematemesis  GENITOURINARY: No dysuria, frequency or hematuria  NEUROLOGICAL: No numbness or weakness  SKIN: No itching, burning, rashes, or lesions   HEME: No bleeding or bruising  MSK: No joint pains or muscle pains  All other review of systems is negative unless indicated above.    PHYSICAL EXAM:  T(C): 36.6 (02-19-20 @ 04:15), Max: 37.1 (02-18-20 @ 13:43)  HR: 67 (02-19-20 @ 04:15) (63 - 71)  BP: 141/69 (02-19-20 @ 04:15) (140/74 - 165/77)  RR: 18 (02-19-20 @ 04:15) (18 - 18)  SpO2: 96% (02-19-20 @ 04:15) (96% - 100%)  Wt(kg): --  I&O's Summary    18 Feb 2020 07:01  -  19 Feb 2020 07:00  --------------------------------------------------------  IN: 240 mL / OUT: 700 mL / NET: -460 mL        Appearance: Normal	  HEENT:   Normal oral mucosa  Cardiovascular: Normal S1 S2, No JVD, No murmurs, No edema  Respiratory: Lungs clear to auscultation	  Psychiatry: A & O x 3, Mood & affect appropriate  Gastrointestinal:  Soft, Non-tender, + BS	  Skin: No rashes, No ecchymoses, No cyanosis	  Neurologic: Non-focal  Extremities: R radial site w/o tenderness, swelling, pulses intact      LABS:	 	    CBC Full  -  ( 19 Feb 2020 04:22 )  WBC Count : 9.87 K/uL  Hemoglobin : 14.9 g/dL  Hematocrit : 45.9 %  Platelet Count - Automated : 213 K/uL  Mean Cell Volume : 86.1 fl  Mean Cell Hemoglobin : 28.0 pg  Mean Cell Hemoglobin Concentration : 32.5 gm/dL  Auto Neutrophil # : x  Auto Lymphocyte # : x  Auto Monocyte # : x  Auto Eosinophil # : x  Auto Basophil # : x  Auto Neutrophil % : x  Auto Lymphocyte % : x  Auto Monocyte % : x  Auto Eosinophil % : x  Auto Basophil % : x    02-19    137  |  102  |  11  ----------------------------<  109<H>  4.3   |  23  |  0.58  02-18    141  |  103  |  9   ----------------------------<  92  4.3   |  25  |  0.63    Ca    9.2      19 Feb 2020 04:22  Ca    9.4      18 Feb 2020 13:54        proBNP: Serum Pro-Brain Natriuretic Peptide: 38 pg/mL (02-14-20 @ 11:41)    TELEMETRY: 	  NSR 60-80s    PREVIOUS DIAGNOSTIC TESTING:    Cath 2/18/20  CORONARY VESSELS: The coronary circulation is right dominant.  LM:   --  LM: Angiography showed minor luminal irregularities with no flow  limiting lesions.  LAD:   --  Proximal LAD: There was a 20 % stenosis at the site of a prior  stent.  CX:   --  OM1: There was a 25 % stenosis at the site of a prior stent.  RCA:   --  Mid RCA: There was a 99 % stenosis.  COMPLICATIONS: There were no complications.  DIAGNOSTIC RECOMMENDATIONS: ASA and Plavix for 1 year.  INTERVENTIONAL RECOMMENDATIONS: ASA and Plavix for 1 year.    	  ASSESSMENT/PLAN: 	  69 yo man with history of CAD/multiple PCI, HTN, HLD presents as outpatient for cardiac cath to evaluate unstable angina over last week s/p cath with mRCA stent.    - no events overnight  - R radial site w/o evidence of hematoma, non-tender, pulses intact  - c/w asa, plavix, atorvastatin, metoprolol  - okay to discharge today      Robert Diaz MD  Cardiology Fellow, M-F 7:30A-5P  [phone]    All Cardiology service information can be found on [email or web address], password: [email or web address].

## 2020-02-19 NOTE — DISCHARGE NOTE NURSING/CASE MANAGEMENT/SOCIAL WORK - PATIENT PORTAL LINK FT
You can access the FollowMyHealth Patient Portal offered by [facility] by registering at the following website: [email or web address]. By joining [employer]’s FollowMyHealth portal, you will also be able to view your health information using other applications (apps) compatible with our system.

## 2020-02-19 NOTE — DISCHARGE NOTE PROVIDER - NSDCMRMEDTOKEN_GEN_ALL_CORE_FT
aspirin 81 mg oral delayed release tablet: 1 tab(s) orally once a day  clopidogrel 75 mg oral tablet: 1 tab(s) orally once a day  metoprolol succinate 50 mg oral tablet, extended release: 1 tab(s) orally once a day  rosuvastatin 10 mg oral tablet: 1 tab(s) orally once a day (at bedtime)

## 2020-02-19 NOTE — DISCHARGE NOTE PROVIDER - HOSPITAL COURSE
69 yo man with history of CAD/multiple PCI, HTN, HLD presents as outpatient for cardiac cath to evaluate unstable angina over last week s/p cath with mRCA stent.        - no events overnight    - R radial site w/o evidence of hematoma, non-tender, pulses intact    - c/w asa, plavix, atorvastatin, metoprolol    - okay to discharge today 69 yo male with history of CAD/ multiple PCI, Non-ST elevation (NSTEMI) myocardial infarction, stable angina pectoris, HTN, HLD MI, GERD without esophagitis,     PSH angioplasty with stents, cataract extraction and insertion of intraocular lens who presented as outpatient for cardiac cath to evaluate unstable angina over last week.    Now  s/p Angiogram with cath with mid RCA: drug-eluting stent. Pt was recently in ER on 2/14/2020 with angina negative troponins and was discharged to home.    Pt stable for discharge today per Cardiology Attending Dr Petty.

## 2020-02-19 NOTE — DISCHARGE NOTE PROVIDER - NSDCCPCAREPLAN_GEN_ALL_CORE_FT
PRINCIPAL DISCHARGE DIAGNOSIS  Diagnosis: CAD S/P percutaneous coronary angioplasty  Assessment and Plan of Treatment: Continue medications as prescribed.  Follow up with you Cardiologist in 1 week on Feb 28th as scheduled.      SECONDARY DISCHARGE DIAGNOSES  Diagnosis: HTN (hypertension)  Assessment and Plan of Treatment: Take your medication as prescribed.  Follow up with your medical doctor for routine blood pressure monitoring, and to establish long term blood pressure treatment goals.  Low salt diet  Activity as tolerated.  Notify your doctor if you have any of the following symptoms:   Dizziness, Lightheadedness, Blurry vision, Headache, Chest pain, Shortness of breath

## 2020-02-19 NOTE — DISCHARGE NOTE PROVIDER - CARE PROVIDER_API CALL
Palla, Venugopal R (MD)  Cardiovascular Disease; Internal Medicine  [address], [place] [id number]  Phone: [phone]  Fax: [phone]  Follow Up Time: 1 week

## 2020-02-19 NOTE — DISCHARGE NOTE PROVIDER - NSDCFUSCHEDAPPT_GEN_ALL_CORE_FT
KIMBERLEY RUSH ; 02/28/2020 ; [facility] KIMBERLEY RUSH ; 02/28/2020 ; [facility]

## 2020-02-28 ENCOUNTER — APPOINTMENT (OUTPATIENT)
Dept: CARDIOLOGY | Facility: CLINIC | Age: 70
End: 2020-02-28
Payer: MEDICARE

## 2020-02-28 ENCOUNTER — NON-APPOINTMENT (OUTPATIENT)
Age: 70
End: 2020-02-28

## 2020-02-28 VITALS — DIASTOLIC BLOOD PRESSURE: 61 MMHG | SYSTOLIC BLOOD PRESSURE: 114 MMHG | HEART RATE: 66 BPM | OXYGEN SATURATION: 99 %

## 2020-02-28 VITALS — WEIGHT: 146 LBS | BODY MASS INDEX: 25.87 KG/M2 | HEIGHT: 63 IN

## 2020-02-28 PROCEDURE — 99214 OFFICE O/P EST MOD 30 MIN: CPT

## 2020-02-28 PROCEDURE — 93000 ELECTROCARDIOGRAM COMPLETE: CPT

## 2020-02-28 NOTE — DISCUSSION/SUMMARY
[Coronary Artery Disease] : coronary artery disease [Hyperlipidemia] : hyperlipidemia [Stable] : stable [Diet Modification] : diet modification [Exercise] : exercise [<70] : goal is <70 [Weight Loss] : weight loss [Patient] : the patient [<150] : goal is <150 [>40] : goal is >40 [Continue] : continuing beta blockers [Hypertension] : hypertension [Sodium Restriction] : sodium restriction [FreeTextEntry1] : Patient dizziness consistent with acute benign positional vertigo , almost resolved  \par \par Patient with CAD with multiple intervention in multiple vessel , last 2/2018 intermediate branch   with recent chest pain resolved after RCA stent 2/28/20 ,   \par \par HTN   controlled , continue medication ,  low salt diet\par \par Hyperlipidemia : continue medication , target LDL <70 \par \par \par \par \par \par \par \par  \par \par \par \par \par

## 2020-02-28 NOTE — REASON FOR VISIT
[Follow-Up - From Hospitalization] : follow-up of a recent hospitalization for [Abnormal ECG] : an abnormal ECG [Chest Pain] : chest pain [Coronary Artery Disease] : coronary artery disease [Hypertension] : hypertension [Hyperlipidemia] : hyperlipidemia [Medication Management] : Medication management [FreeTextEntry1] : \par f/u visit for CAD,stable angina

## 2020-02-28 NOTE — REVIEW OF SYSTEMS
[Fever] : no fever [Eyeglasses] : not currently wearing eyeglasses [Chills] : no chills [Blurry Vision] : no blurred vision [Mouth Sores] : no mouth sores [Earache] : no earache [Shortness Of Breath] : no shortness of breath [Chest Pain] : no chest pain [Cough] : no cough [Abdominal Pain] : no abdominal pain [Heartburn] : no heartburn [Dysphagia] : no dysphagia

## 2020-02-28 NOTE — HISTORY OF PRESENT ILLNESS
[FreeTextEntry1] : Patient  with history of hypertension, hyperlipidemia and coronary artery disease. status post stent placement 2006  LCX /2009 RCA .  LAD 9/2017 10/2017 , Intermediate artery  feb 2018 , who came for follow up after having recommended cardiac cath last week  which showed  RCA 99% lesion which was stented , his symptoms resolved ,  patient denies any recurrence , \par \par \par denies any other  symptoms , patient recent blood work  glucose 88  , HbA1c 6.2 ,normal lipid profile  LDL 63\par \par Patient blood pressure is controlled , hyperlipidemia controlled \par \par \par  \par

## 2020-02-28 NOTE — PHYSICAL EXAM
[General Appearance - Well Developed] : well developed [Normal Appearance] : normal appearance [Well Groomed] : well groomed [General Appearance - Well Nourished] : well nourished [General Appearance - In No Acute Distress] : no acute distress [No Deformities] : no deformities [Eyelids - No Xanthelasma] : the eyelids demonstrated no xanthelasmas [Normal Conjunctiva] : the conjunctiva exhibited no abnormalities [Normal Oral Mucosa] : normal oral mucosa [No Oral Pallor] : no oral pallor [No Oral Cyanosis] : no oral cyanosis [Normal Jugular Venous A Waves Present] : normal jugular venous A waves present [No Jugular Venous Rosario A Waves] : no jugular venous rosario A waves [Normal Jugular Venous V Waves Present] : normal jugular venous V waves present [Auscultation Breath Sounds / Voice Sounds] : lungs were clear to auscultation bilaterally [Respiration, Rhythm And Depth] : normal respiratory rhythm and effort [Exaggerated Use Of Accessory Muscles For Inspiration] : no accessory muscle use [Heart Sounds] : normal S1 and S2 [Heart Rate And Rhythm] : heart rate and rhythm were normal [Murmurs] : no murmurs present [Abdomen Soft] : soft [Abdomen Tenderness] : non-tender [Abdomen Mass (___ Cm)] : no abdominal mass palpated [Abnormal Walk] : normal gait [Gait - Sufficient For Exercise Testing] : the gait was sufficient for exercise testing [Cyanosis, Localized] : no localized cyanosis [Nail Clubbing] : no clubbing of the fingernails [Petechial Hemorrhages (___cm)] : no petechial hemorrhages [Skin Color & Pigmentation] : normal skin color and pigmentation [] : no rash [No Venous Stasis] : no venous stasis [Skin Lesions] : no skin lesions [No Xanthoma] : no  xanthoma was observed [No Skin Ulcers] : no skin ulcer [Oriented To Time, Place, And Person] : oriented to person, place, and time [Mood] : the mood was normal [Affect] : the affect was normal [No Anxiety] : not feeling anxious

## 2020-04-08 NOTE — ED ADULT NURSE NOTE - CAS EDN DISCHARGE INTERVENTIONS
Advised Moises  we can schedule well care  and immunizations   as soon as clinic opens  for routine care again,   No worries about immunizations due, next is MMR, he is generally up to date.    Arm band on/IV intact

## 2020-05-28 ENCOUNTER — NON-APPOINTMENT (OUTPATIENT)
Age: 70
End: 2020-05-28

## 2020-05-28 ENCOUNTER — APPOINTMENT (OUTPATIENT)
Dept: CARDIOLOGY | Facility: CLINIC | Age: 70
End: 2020-05-28
Payer: MEDICARE

## 2020-05-28 VITALS — DIASTOLIC BLOOD PRESSURE: 68 MMHG | SYSTOLIC BLOOD PRESSURE: 120 MMHG

## 2020-05-28 VITALS — DIASTOLIC BLOOD PRESSURE: 68 MMHG | SYSTOLIC BLOOD PRESSURE: 150 MMHG | HEART RATE: 75 BPM

## 2020-05-28 VITALS — WEIGHT: 144 LBS | HEIGHT: 63 IN | BODY MASS INDEX: 25.52 KG/M2

## 2020-05-28 PROCEDURE — 99214 OFFICE O/P EST MOD 30 MIN: CPT

## 2020-05-28 PROCEDURE — 93000 ELECTROCARDIOGRAM COMPLETE: CPT

## 2020-05-28 NOTE — CARDIOLOGY SUMMARY
[No Symptoms] : no Symptoms [No Exercise Ind Arr] : no exercise induced arrhythmias [Ant Wall Defect] : anterior wall defect [Inf Wall Defect] : inferior wall defect [Fixed Defect] : fixed defect [LVEF ___%] : LVEF [unfilled]% [___] : [unfilled]

## 2020-05-28 NOTE — HISTORY OF PRESENT ILLNESS
[FreeTextEntry1] : Patient  with history of hypertension, hyperlipidemia and coronary artery disease. status post stent placement 2006  LCX /2009 RCA .  LAD 9/2017 10/2017 , Intermediate artery  feb 2018 ,   RCA  stent  2/18/20 came for follow up says he is doing well , except occasional brief left sided localized chest discomfort different from his cardiac chest pain , not related to exertion ,  occurs very rarely , \par \par \par denies any other  symptoms , patient recent blood work  glucose 88  , HbA1c 6.2 ,normal lipid profile  LDL 63\par \par Patient blood pressure is controlled , hyperlipidemia controlled \par \par \par  \par

## 2020-05-28 NOTE — PHYSICAL EXAM
[General Appearance - Well Developed] : well developed [Normal Appearance] : normal appearance [No Deformities] : no deformities [Well Groomed] : well groomed [General Appearance - Well Nourished] : well nourished [General Appearance - In No Acute Distress] : no acute distress [Normal Conjunctiva] : the conjunctiva exhibited no abnormalities [No Oral Pallor] : no oral pallor [Normal Oral Mucosa] : normal oral mucosa [Eyelids - No Xanthelasma] : the eyelids demonstrated no xanthelasmas [Normal Jugular Venous A Waves Present] : normal jugular venous A waves present [Normal Jugular Venous V Waves Present] : normal jugular venous V waves present [No Oral Cyanosis] : no oral cyanosis [No Jugular Venous Rosario A Waves] : no jugular venous rosario A waves [Exaggerated Use Of Accessory Muscles For Inspiration] : no accessory muscle use [Auscultation Breath Sounds / Voice Sounds] : lungs were clear to auscultation bilaterally [Respiration, Rhythm And Depth] : normal respiratory rhythm and effort [Heart Sounds] : normal S1 and S2 [Heart Rate And Rhythm] : heart rate and rhythm were normal [Abdomen Tenderness] : non-tender [Abdomen Soft] : soft [Murmurs] : no murmurs present [Abnormal Walk] : normal gait [Abdomen Mass (___ Cm)] : no abdominal mass palpated [Gait - Sufficient For Exercise Testing] : the gait was sufficient for exercise testing [Cyanosis, Localized] : no localized cyanosis [Nail Clubbing] : no clubbing of the fingernails [Petechial Hemorrhages (___cm)] : no petechial hemorrhages [No Venous Stasis] : no venous stasis [] : no rash [Skin Color & Pigmentation] : normal skin color and pigmentation [No Skin Ulcers] : no skin ulcer [No Xanthoma] : no  xanthoma was observed [Skin Lesions] : no skin lesions [Mood] : the mood was normal [Affect] : the affect was normal [Oriented To Time, Place, And Person] : oriented to person, place, and time [No Anxiety] : not feeling anxious

## 2020-05-28 NOTE — REASON FOR VISIT
[Follow-Up - Clinic] : a clinic follow-up of [Abnormal ECG] : an abnormal ECG [Chest Pain] : chest pain [Coronary Artery Disease] : coronary artery disease [Hyperlipidemia] : hyperlipidemia [Hypertension] : hypertension [Medication Management] : Medication management [FreeTextEntry1] : \par f/u visit for CAD,stable angina

## 2020-05-28 NOTE — REVIEW OF SYSTEMS
[see HPI] : see HPI [Chest Pain] : chest pain [Chills] : no chills [Fever] : no fever [Blurry Vision] : no blurred vision [Eyeglasses] : not currently wearing eyeglasses [Mouth Sores] : no mouth sores [Shortness Of Breath] : no shortness of breath [Earache] : no earache [Abdominal Pain] : no abdominal pain [Cough] : no cough [Dysphagia] : no dysphagia [Heartburn] : no heartburn

## 2020-05-28 NOTE — DISCUSSION/SUMMARY
[Coronary Artery Disease] : coronary artery disease [Hyperlipidemia] : hyperlipidemia [Stable] : stable [Diet Modification] : diet modification [Exercise] : exercise [Weight Loss] : weight loss [<150] : goal is <150 [>40] : goal is >40 [<70] : goal is <70 [Hypertension] : hypertension [Patient] : the patient [Sodium Restriction] : sodium restriction [Continue] : continuing beta blockers [FreeTextEntry1] : Patient with above hx  atypical chest pain  appears to be GERD  ? continue pantoprazole daily \par \par Patient with CAD with multiple intervention in multiple vessel , last 2/2018 intermediate branch   CA stent 2/28/20 ,   atypical chest pain ,  will obtain chemical stress test .\par \par HTN   controlled , continue medication ,  low salt diet\par \par Hyperlipidemia : continue medication , target LDL <70 \par \par \par \par \par \par \par \par  \par \par \par \par \par

## 2020-06-10 ENCOUNTER — APPOINTMENT (OUTPATIENT)
Dept: CARDIOLOGY | Facility: CLINIC | Age: 70
End: 2020-06-10
Payer: MEDICARE

## 2020-06-10 PROCEDURE — 93306 TTE W/DOPPLER COMPLETE: CPT

## 2020-06-11 ENCOUNTER — APPOINTMENT (OUTPATIENT)
Dept: CARDIOLOGY | Facility: CLINIC | Age: 70
End: 2020-06-11
Payer: MEDICARE

## 2020-06-11 PROCEDURE — A9500: CPT

## 2020-06-11 PROCEDURE — 93015 CV STRESS TEST SUPVJ I&R: CPT

## 2020-06-11 PROCEDURE — 78452 HT MUSCLE IMAGE SPECT MULT: CPT

## 2020-08-04 ENCOUNTER — RX RENEWAL (OUTPATIENT)
Age: 70
End: 2020-08-04

## 2020-08-07 ENCOUNTER — APPOINTMENT (OUTPATIENT)
Dept: CARDIOLOGY | Facility: CLINIC | Age: 70
End: 2020-08-07
Payer: MEDICARE

## 2020-08-07 ENCOUNTER — NON-APPOINTMENT (OUTPATIENT)
Age: 70
End: 2020-08-07

## 2020-08-07 VITALS
HEART RATE: 70 BPM | BODY MASS INDEX: 25.69 KG/M2 | WEIGHT: 145 LBS | SYSTOLIC BLOOD PRESSURE: 165 MMHG | HEIGHT: 63 IN | OXYGEN SATURATION: 97 % | DIASTOLIC BLOOD PRESSURE: 62 MMHG

## 2020-08-07 VITALS — SYSTOLIC BLOOD PRESSURE: 140 MMHG | DIASTOLIC BLOOD PRESSURE: 78 MMHG

## 2020-08-07 PROCEDURE — 99214 OFFICE O/P EST MOD 30 MIN: CPT

## 2020-08-07 PROCEDURE — 93000 ELECTROCARDIOGRAM COMPLETE: CPT

## 2020-08-07 NOTE — DISCUSSION/SUMMARY
[Coronary Artery Disease] : coronary artery disease [Hyperlipidemia] : hyperlipidemia [Stable] : stable [Diet Modification] : diet modification [Exercise] : exercise [Weight Loss] : weight loss [<150] : goal is <150 [<70] : goal is <70 [>40] : goal is >40 [Patient] : the patient [Hypertension] : hypertension [Continue] : continuing beta blockers [Sodium Restriction] : sodium restriction [FreeTextEntry1] : Patient with above hx  atypical chest pain  appears to be GERD  ? continue pantoprazole daily \par \par Patient with CAD with multiple intervention in multiple vessel , last 2/2018 intermediate branch   CA stent 2/28/20 ,   atypical chest pain ,  will obtain chemical stress test .\par \par HTN  mild elevated , continue medication ,  low salt diet\par \par Hyperlipidemia : continue medication , target LDL <70 \par \par chronic  LBBB   continue monitor \par \par \par \par \par \par \par \par  \par \par \par \par \par

## 2020-08-07 NOTE — PHYSICAL EXAM
[Normal Appearance] : normal appearance [General Appearance - Well Developed] : well developed [Well Groomed] : well groomed [No Deformities] : no deformities [General Appearance - Well Nourished] : well nourished [General Appearance - In No Acute Distress] : no acute distress [Normal Conjunctiva] : the conjunctiva exhibited no abnormalities [Eyelids - No Xanthelasma] : the eyelids demonstrated no xanthelasmas [No Oral Pallor] : no oral pallor [Normal Oral Mucosa] : normal oral mucosa [Normal Jugular Venous A Waves Present] : normal jugular venous A waves present [No Oral Cyanosis] : no oral cyanosis [No Jugular Venous Rosario A Waves] : no jugular venous rosario A waves [Normal Jugular Venous V Waves Present] : normal jugular venous V waves present [Exaggerated Use Of Accessory Muscles For Inspiration] : no accessory muscle use [Auscultation Breath Sounds / Voice Sounds] : lungs were clear to auscultation bilaterally [Respiration, Rhythm And Depth] : normal respiratory rhythm and effort [Heart Sounds] : normal S1 and S2 [Heart Rate And Rhythm] : heart rate and rhythm were normal [Abdomen Soft] : soft [Murmurs] : no murmurs present [Abdomen Tenderness] : non-tender [Abdomen Mass (___ Cm)] : no abdominal mass palpated [Nail Clubbing] : no clubbing of the fingernails [Abnormal Walk] : normal gait [Gait - Sufficient For Exercise Testing] : the gait was sufficient for exercise testing [Cyanosis, Localized] : no localized cyanosis [Skin Color & Pigmentation] : normal skin color and pigmentation [Petechial Hemorrhages (___cm)] : no petechial hemorrhages [No Venous Stasis] : no venous stasis [] : no rash [Skin Lesions] : no skin lesions [No Skin Ulcers] : no skin ulcer [No Xanthoma] : no  xanthoma was observed [Oriented To Time, Place, And Person] : oriented to person, place, and time [Mood] : the mood was normal [Affect] : the affect was normal [No Anxiety] : not feeling anxious

## 2020-08-07 NOTE — HISTORY OF PRESENT ILLNESS
[FreeTextEntry1] : Patient  with history of hypertension, hyperlipidemia and coronary artery disease. status post stent placement 2006  LCX /2009 RCA .  LAD 9/2017 10/2017 , Intermediate artery  feb 2018 ,   RCA  stent  2/18/20 came for follow up says he is doing well . patient did have acid reflux symptoms when he stopped PPI  , got better restarting  , patient  denies any exertional chest pain or palpitation \par \par denies any other  symptoms , patient recent blood work  glucose 88  , HbA1c 6.2 ,normal lipid profile  LDL 63\par \par Patient blood pressure is controlled , hyperlipidemia controlled   patient had echo , nuclear stress showed fixed defect \par \par \par  \par

## 2020-08-07 NOTE — REVIEW OF SYSTEMS
[see HPI] : see HPI [Chest Pain] : chest pain [Fever] : no fever [Blurry Vision] : no blurred vision [Eyeglasses] : not currently wearing eyeglasses [Chills] : no chills [Earache] : no earache [Mouth Sores] : no mouth sores [Heartburn] : no heartburn [Shortness Of Breath] : no shortness of breath [Abdominal Pain] : no abdominal pain [Cough] : no cough [Dysphagia] : no dysphagia

## 2020-08-07 NOTE — CARDIOLOGY SUMMARY
[No Symptoms] : no Symptoms [No Exercise Ind Arr] : no exercise induced arrhythmias [Fixed Defect] : fixed defect [Inf Wall Defect] : inferior wall defect [Ant Wall Defect] : anterior wall defect [LVEF ___%] : LVEF [unfilled]% [___] : [unfilled] [___] : [unfilled]

## 2020-08-07 NOTE — REASON FOR VISIT
[Follow-Up - Clinic] : a clinic follow-up of [Abnormal ECG] : an abnormal ECG [Coronary Artery Disease] : coronary artery disease [Hyperlipidemia] : hyperlipidemia [Medication Management] : Medication management [Hypertension] : hypertension [FreeTextEntry1] : \par

## 2020-11-12 ENCOUNTER — NON-APPOINTMENT (OUTPATIENT)
Age: 70
End: 2020-11-12

## 2020-11-12 ENCOUNTER — APPOINTMENT (OUTPATIENT)
Dept: CARDIOLOGY | Facility: CLINIC | Age: 70
End: 2020-11-12
Payer: MEDICARE

## 2020-11-12 VITALS
DIASTOLIC BLOOD PRESSURE: 66 MMHG | HEART RATE: 63 BPM | OXYGEN SATURATION: 97 % | HEIGHT: 63 IN | BODY MASS INDEX: 25.52 KG/M2 | SYSTOLIC BLOOD PRESSURE: 163 MMHG | WEIGHT: 144 LBS

## 2020-11-12 VITALS — SYSTOLIC BLOOD PRESSURE: 148 MMHG | DIASTOLIC BLOOD PRESSURE: 66 MMHG

## 2020-11-12 PROCEDURE — 99214 OFFICE O/P EST MOD 30 MIN: CPT

## 2020-11-12 PROCEDURE — 99072 ADDL SUPL MATRL&STAF TM PHE: CPT

## 2020-11-12 PROCEDURE — 93000 ELECTROCARDIOGRAM COMPLETE: CPT

## 2020-11-12 NOTE — REASON FOR VISIT
[Follow-Up - Clinic] : a clinic follow-up of [Abnormal ECG] : an abnormal ECG [Coronary Artery Disease] : coronary artery disease [Hyperlipidemia] : hyperlipidemia [Hypertension] : hypertension [Medication Management] : Medication management [FreeTextEntry1] : \par

## 2020-11-12 NOTE — DISCUSSION/SUMMARY
[Coronary Artery Disease] : coronary artery disease [Hyperlipidemia] : hyperlipidemia [Stable] : stable [Diet Modification] : diet modification [Exercise] : exercise [Weight Loss] : weight loss [<70] : goal is <70 [<150] : goal is <150 [>40] : goal is >40 [Patient] : the patient [Hypertension] : hypertension [Continue] : continuing beta blockers [Sodium Restriction] : sodium restriction [FreeTextEntry1] : Patient with above hx  atypical chest pain  due to  GERD   resolved on PPI continue pantoprazole daily \par \par Patient with CAD with multiple intervention in multiple vessel , last 2/2018 intermediate branch   CA stent 2/28/20 ,   no active chest pain , no evidence of ischemia on stress test , continue PPI \par \par HTN  mild elevated , add losartan 50 mg po daily , monitor renal function     low salt diet  patient will have blood work with primary  home BP monitoring , \par \par Hyperlipidemia : continue medication , target LDL <70 \par \par chronic  LBBB   continue monitor \par \par \par \par \par \par \par \par  \par \par \par \par \par

## 2020-11-12 NOTE — HISTORY OF PRESENT ILLNESS
[FreeTextEntry1] : Patient  with history of hypertension, hyperlipidemia and coronary artery disease. status post stent placement 2006  LCX /2009 RCA .  LAD 9/2017 10/2017 , Intermediate artery  feb 2018 ,   RCA  stent  2/18/20 came for follow up says he is doing well .  his reflux symptoms improved  on PPI , patient denies chest pain or shortness of breath \par \par  patient recent blood work  glucose 88  , HbA1c 6.2 ,normal lipid profile  LDL 63\par \par Patient blood pressure is  mild elevated , hyperlipidemia controlled   patient had echo , nuclear stress showed fixed defect \par \par \par  \par
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
## 2020-12-23 PROBLEM — Z12.11 ENCOUNTER FOR COLORECTAL CANCER SCREENING: Status: RESOLVED | Noted: 2019-01-29 | Resolved: 2020-12-23

## 2021-02-04 ENCOUNTER — RX RENEWAL (OUTPATIENT)
Age: 71
End: 2021-02-04

## 2021-02-05 ENCOUNTER — APPOINTMENT (OUTPATIENT)
Dept: CARDIOLOGY | Facility: CLINIC | Age: 71
End: 2021-02-05
Payer: MEDICARE

## 2021-02-05 ENCOUNTER — NON-APPOINTMENT (OUTPATIENT)
Age: 71
End: 2021-02-05

## 2021-02-05 VITALS
HEIGHT: 63 IN | WEIGHT: 145 LBS | DIASTOLIC BLOOD PRESSURE: 75 MMHG | HEART RATE: 62 BPM | SYSTOLIC BLOOD PRESSURE: 137 MMHG | BODY MASS INDEX: 25.69 KG/M2 | OXYGEN SATURATION: 98 %

## 2021-02-05 VITALS — SYSTOLIC BLOOD PRESSURE: 130 MMHG | DIASTOLIC BLOOD PRESSURE: 68 MMHG

## 2021-02-05 PROCEDURE — 93000 ELECTROCARDIOGRAM COMPLETE: CPT

## 2021-02-05 PROCEDURE — 99072 ADDL SUPL MATRL&STAF TM PHE: CPT

## 2021-02-05 PROCEDURE — 99214 OFFICE O/P EST MOD 30 MIN: CPT

## 2021-02-05 NOTE — REVIEW OF SYSTEMS
[Fever] : no fever [Chills] : no chills [Blurry Vision] : no blurred vision [Eyeglasses] : not currently wearing eyeglasses [Earache] : no earache [Mouth Sores] : no mouth sores [see HPI] : see HPI [Shortness Of Breath] : no shortness of breath [Chest Pain] : chest pain [Cough] : no cough [Abdominal Pain] : no abdominal pain [Heartburn] : no heartburn [Dysphagia] : no dysphagia

## 2021-02-05 NOTE — HISTORY OF PRESENT ILLNESS
[FreeTextEntry1] : Patient  with history of hypertension, hyperlipidemia and coronary artery disease. status post stent placement 2006  LCX /2009 RCA .  LAD 9/2017 10/2017 , Intermediate artery  feb 2018 ,   RCA  stent  2/18/20 came for follow up says he is doing well .  his reflux symptoms improved  on PPI , patient denies chest pain or shortness of breath \par \par  patient recent blood work  glucose 116  , HbA1c 6.2 ,normal lipid profile  LDL 54\par \par Patient blood pressure is  controlled  after adding losartan  , hyperlipidemia controlled   patient had echo , nuclear stress showed fixed defect \par \par \par  \par

## 2021-02-05 NOTE — DISCUSSION/SUMMARY
[Coronary Artery Disease] : coronary artery disease [Hyperlipidemia] : hyperlipidemia [Stable] : stable [Diet Modification] : diet modification [Exercise] : exercise [Weight Loss] : weight loss [<70] : goal is <70 [<150] : goal is <150 [>40] : goal is >40 [Patient] : the patient [Hypertension] : hypertension [Continue] : continuing beta blockers [Sodium Restriction] : sodium restriction [FreeTextEntry1] : Patient with above hx  atypical chest pain  due to  GERD   resolved on PPI continue pantoprazole daily \par \par Patient with CAD with multiple intervention in multiple vessel , last 2/2018 intermediate branch   CA stent 2/28/20 ,   no active chest pain , no evidence of ischemia on stress test , continue PPI \par \par HTN  controlled  losartan 50 mg po daily , monitor renal function     low salt diet  patient will have blood work with primary  home BP monitoring , \par \par Hyperlipidemia : continue medication , target LDL <70 \par \par chronic  LBBB   continue monitor \par \par \par \par \par \par \par \par  \par \par \par \par \par

## 2021-05-03 ENCOUNTER — RX RENEWAL (OUTPATIENT)
Age: 71
End: 2021-05-03

## 2021-06-04 ENCOUNTER — NON-APPOINTMENT (OUTPATIENT)
Age: 71
End: 2021-06-04

## 2021-06-04 ENCOUNTER — APPOINTMENT (OUTPATIENT)
Dept: CARDIOLOGY | Facility: CLINIC | Age: 71
End: 2021-06-04
Payer: MEDICARE

## 2021-06-04 VITALS
SYSTOLIC BLOOD PRESSURE: 136 MMHG | OXYGEN SATURATION: 97 % | TEMPERATURE: 98.4 F | HEIGHT: 63 IN | HEART RATE: 67 BPM | DIASTOLIC BLOOD PRESSURE: 68 MMHG | WEIGHT: 141 LBS | BODY MASS INDEX: 24.98 KG/M2

## 2021-06-04 PROCEDURE — 99214 OFFICE O/P EST MOD 30 MIN: CPT

## 2021-06-04 PROCEDURE — 93000 ELECTROCARDIOGRAM COMPLETE: CPT

## 2021-06-04 NOTE — HISTORY OF PRESENT ILLNESS
[FreeTextEntry1] : Patient  with history of hypertension, hyperlipidemia and coronary artery disease. status post stent placement 2006  LCX /2009 RCA .  LAD 9/2017 10/2017 , Intermediate artery  feb 2018 ,   RCA  stent  2/18/20 , reflux symptoms improved  on PPI , patient denies chest pain or shortness of breath  physically active , did have skin biopsy on skin close to his right eye \par \par  patient recent blood work  glucose 116  , HbA1c 6.2 ,normal lipid profile  LDL 49  5/8/21 \par \par Patient blood pressure is  controlled  after adding losartan  , hyperlipidemia controlled   patient had echo , nuclear stress showed fixed defect \par \par \par  \par

## 2021-06-04 NOTE — CARDIOLOGY SUMMARY
[de-identified] : normal sinus rhythm LBBB [de-identified] : 6/11/20 fixed anterior wall ,apical wall , inferior wall defect (diaphragmatic defect ) EF 57% apical wall hypokinesis  [de-identified] : 6/10/20  mild dilated aorta 4.0 cm , mild paradoxical septal motion with  apical hypokinesis EF 50-55% [de-identified] : 2/28/20 LM normal , Mid LAD 20% at prior stent , LCX OM1 25%,Mid RCA 99%\par \par 2/12/18  RAMUS   ,LAD stent 9/2017,   RCA stent  2/28/20

## 2021-06-04 NOTE — DISCUSSION/SUMMARY
[Coronary Artery Disease] : coronary artery disease [Hyperlipidemia] : hyperlipidemia [Stable] : stable [Diet Modification] : diet modification [Exercise] : exercise [Weight Loss] : weight loss [<70] : goal is <70 [<150] : goal is <150 [>40] : goal is >40 [Patient] : the patient [Hypertension] : hypertension [Continue] : continuing beta blockers [FreeTextEntry1] : Patient with above hx  atypical chest pain  due to  GERD   resolved on PPI continue pantoprazole daily \par \par Patient with CAD with multiple intervention in multiple vessel , last 2/2018 intermediate branch   CA stent 2/28/20 ,   no active chest pain , no evidence of ischemia on stress test , continue PPI \par \par HTN  controlled  losartan 50 mg po daily , monitor renal function     low salt diet  patient will have blood work with primary  home BP monitoring , \par \par Hyperlipidemia : continue medication , target LDL <70 \par \par chronic  LBBB   continue monitor \par \par \par \par follow up after 4 months  spent  >30 minutes total reviewing blood work and discussion \par \par \par \par  \par \par \par \par \par

## 2021-10-08 NOTE — DISCHARGE NOTE ADULT - VISION (WITH CORRECTIVE LENSES IF THE PATIENT USUALLY WEARS THEM):
Negative Screen reading glasses/Normal vision: sees adequately in most situations; can see medication labels, newsprint

## 2021-10-10 ENCOUNTER — RX RENEWAL (OUTPATIENT)
Age: 71
End: 2021-10-10

## 2021-10-14 NOTE — ED ADULT TRIAGE NOTE - BP NONINVASIVE DIASTOLIC (MM HG)
Isotretinoin Counseling: Patient should get monthly blood tests, not donate blood, not drive at night if vision affected, not share medication, and not undergo elective surgery for 6 months after tx completed. Side effects reviewed, pt to contact office should one occur. 69

## 2021-10-15 ENCOUNTER — NON-APPOINTMENT (OUTPATIENT)
Age: 71
End: 2021-10-15

## 2021-10-15 ENCOUNTER — APPOINTMENT (OUTPATIENT)
Dept: CARDIOLOGY | Facility: CLINIC | Age: 71
End: 2021-10-15
Payer: MEDICARE

## 2021-10-15 VITALS
HEART RATE: 64 BPM | WEIGHT: 142 LBS | OXYGEN SATURATION: 97 % | DIASTOLIC BLOOD PRESSURE: 72 MMHG | HEIGHT: 63 IN | BODY MASS INDEX: 25.16 KG/M2 | SYSTOLIC BLOOD PRESSURE: 129 MMHG

## 2021-10-15 PROCEDURE — 93000 ELECTROCARDIOGRAM COMPLETE: CPT

## 2021-10-15 PROCEDURE — 99214 OFFICE O/P EST MOD 30 MIN: CPT

## 2021-10-15 NOTE — DISCUSSION/SUMMARY
[Coronary Artery Disease] : coronary artery disease [Hyperlipidemia] : hyperlipidemia [Stable] : stable [Diet Modification] : diet modification [Exercise] : exercise [Weight Loss] : weight loss [<70] : goal is <70 [<150] : goal is <150 [>40] : goal is >40 [Patient] : the patient [Hypertension] : hypertension [FreeTextEntry1] : Patient with above hx  atypical chest pain  due to  GERD   resolved on PPI continue pantoprazole daily \par \par Patient with CAD with multiple intervention in multiple vessel , last 2/2018 intermediate branch   CA stent 2/28/20 ,   no active chest pain , no evidence of ischemia on stress test , continue PPI \par \par HTN  controlled  losartan 50 mg po daily , monitor renal function     low salt diet  patient will have blood work with primary  home BP monitoring , \par \par Hyperlipidemia : continue medication , target LDL <70 \par \par chronic  LBBB   continue monitor \par \par \par \par follow up after 4 months  spent  >30 minutes total reviewing blood work and discussion \par \par \par \par  \par \par \par \par \par

## 2021-10-15 NOTE — CARDIOLOGY SUMMARY
[de-identified] : normal sinus rhythm LBBB [de-identified] : 6/11/20 fixed anterior wall ,apical wall , inferior wall defect (diaphragmatic defect ) EF 57% apical wall hypokinesis  [de-identified] : 6/10/20  mild dilated aorta 4.0 cm , mild paradoxical septal motion with  apical hypokinesis EF 50-55% [de-identified] : 2/28/20 LM normal , Mid LAD 20% at prior stent , LCX OM1 25%,Mid RCA 99%\par \par 2/12/18  RAMUS   ,LAD stent 9/2017,   RCA stent  2/28/20

## 2021-10-15 NOTE — CARDIOLOGY SUMMARY
[de-identified] : normal sinus rhythm LBBB [de-identified] : 6/11/20 fixed anterior wall ,apical wall , inferior wall defect (diaphragmatic defect ) EF 57% apical wall hypokinesis  [de-identified] : 6/10/20  mild dilated aorta 4.0 cm , mild paradoxical septal motion with  apical hypokinesis EF 50-55% [de-identified] : 2/28/20 LM normal , Mid LAD 20% at prior stent , LCX OM1 25%,Mid RCA 99%\par \par 2/12/18  RAMUS   ,LAD stent 9/2017,   RCA stent  2/28/20

## 2021-11-01 NOTE — DISCHARGE NOTE NURSING/CASE MANAGEMENT/SOCIAL WORK - CAREGIVER NAME
[FreeTextEntry1] : Rapid Covid test negative in office.\par Rapid strep test negative in office, throat culture sent to lab.\par Will follow up by phone if throat culture results are positive.\par Advise supportive care. Tylenol or Motrin as needed for pain or fever. Increase fluids, rest.\par Trial of daily Claritin.\par Flonase 1 spray each nostril daily.\par Follow up if symptoms persist or worsen.\par 
Astrid Castellon

## 2021-11-10 ENCOUNTER — RX RENEWAL (OUTPATIENT)
Age: 71
End: 2021-11-10

## 2022-01-26 NOTE — H&P CARDIOLOGY - NSCAFFEINETYPE_GEN_ALL_CORE_SD
tea Otezla Counseling: The side effects of Otezla were discussed with the patient, including but not limited to worsening or new depression, weight loss, diarrhea, nausea, upper respiratory tract infection, and headache. Patient instructed to call the office should any adverse effect occur.  The patient verbalized understanding of the proper use and possible adverse effects of Otezla.  All the patient's questions and concerns were addressed.

## 2022-02-09 NOTE — PHYSICAL EXAM
[Well Developed] : well developed [Well Nourished] : well nourished [No Acute Distress] : no acute distress [Normal Conjunctiva] : normal conjunctiva [Normal Venous Pressure] : normal venous pressure [No Carotid Bruit] : no carotid bruit [Normal S1, S2] : normal S1, S2 [No Murmur] : no murmur [No Rub] : no rub [No Gallop] : no gallop [Clear Lung Fields] : clear lung fields [Good Air Entry] : good air entry [No Respiratory Distress] : no respiratory distress  [Soft] : abdomen soft [Non Tender] : non-tender [Normal Bowel Sounds] : normal bowel sounds [Normal Gait] : normal gait [No Edema] : no edema [No Cyanosis] : no cyanosis [No Clubbing] : no clubbing [No Varicosities] : no varicosities [No Rash] : no rash [No Skin Lesions] : no skin lesions [Moves all extremities] : moves all extremities [No Focal Deficits] : no focal deficits [Normal Speech] : normal speech [Alert and Oriented] : alert and oriented [Normal memory] : normal memory

## 2022-02-10 ENCOUNTER — APPOINTMENT (OUTPATIENT)
Dept: CARDIOLOGY | Facility: CLINIC | Age: 72
End: 2022-02-10
Payer: MEDICARE

## 2022-02-10 ENCOUNTER — NON-APPOINTMENT (OUTPATIENT)
Age: 72
End: 2022-02-10

## 2022-02-10 VITALS
HEART RATE: 66 BPM | BODY MASS INDEX: 25.52 KG/M2 | SYSTOLIC BLOOD PRESSURE: 132 MMHG | WEIGHT: 144 LBS | HEIGHT: 63 IN | OXYGEN SATURATION: 98 % | DIASTOLIC BLOOD PRESSURE: 70 MMHG

## 2022-02-10 VITALS — SYSTOLIC BLOOD PRESSURE: 126 MMHG | DIASTOLIC BLOOD PRESSURE: 70 MMHG

## 2022-02-10 VITALS — SYSTOLIC BLOOD PRESSURE: 120 MMHG | DIASTOLIC BLOOD PRESSURE: 70 MMHG

## 2022-02-10 PROCEDURE — 99214 OFFICE O/P EST MOD 30 MIN: CPT

## 2022-02-10 PROCEDURE — 93000 ELECTROCARDIOGRAM COMPLETE: CPT

## 2022-02-10 NOTE — REVIEW OF SYSTEMS
[Negative] : Heme/Lymph [SOB] : no shortness of breath [Chest Discomfort] : chest discomfort [Leg Claudication] : no intermittent leg claudication [Syncope] : no syncope

## 2022-02-10 NOTE — HISTORY OF PRESENT ILLNESS
[FreeTextEntry1] : Patient  with history of hypertension, hyperlipidemia and coronary artery disease. status post stent placement 2006  LCX /2009 RCA .  LAD 9/2017 10/2017 , Intermediate artery  feb 2018 ,   RCA  stent  2/18/20 ,GERD came for follow up says he is doing well , patient did some heavy physical work , lifting , did feel mild soreness in the chest , increase with touching  chest wall , not related to walking , not associated with shortness of breath , different from  his cardiac pain , patient \par \par  patient recent blood work  glucose 101   , HbA1c 6.2 ,normal lipid profile  LDL 48  1/6/21\par \par Patient blood pressure is  controlled  after adding losartan  , hyperlipidemia controlled   patient had echo , nuclear stress showed fixed defect \par \par \par  \par

## 2022-02-10 NOTE — DISCUSSION/SUMMARY
[Coronary Artery Disease] : coronary artery disease [Hyperlipidemia] : hyperlipidemia [Stable] : stable [Diet Modification] : diet modification [Exercise] : exercise [Weight Loss] : weight loss [<70] : goal is <70 [<150] : goal is <150 [>40] : goal is >40 [Patient] : the patient [Hypertension] : hypertension [FreeTextEntry1] : Patient with above hx  atypical chest pain   due to musculoskeletal chest pain due to lifting weight , \par \par \par GERD   resolved on PPI  stopped \par \par Patient with CAD with multiple intervention in multiple vessel , last 2/2018 intermediate branch   CA stent 2/28/20 ,   no active chest pain , no evidence of ischemia on stress test , continue PPI \par \par HTN  controlled  losartan 50 mg po daily , monitor renal function     low salt diet  patient will have blood work with primary  home BP monitoring , \par \par Hyperlipidemia : continue medication , target LDL <70 \par \par chronic  LBBB   continue monitor \par \par \par \par follow up after 4 months  spent  >30 minutes total reviewing blood work and discussion \par \par \par \par  \par \par \par \par \par

## 2022-02-10 NOTE — CARDIOLOGY SUMMARY
[de-identified] : 2/10/22 normal sinus rhythm LBBB  atypical  [de-identified] : 6/11/20 fixed anterior wall ,apical wall , inferior wall defect (diaphragmatic defect ) EF 57% apical wall hypokinesis  [de-identified] : 6/10/20  mild dilated aorta 4.0 cm , mild paradoxical septal motion with  apical hypokinesis EF 50-55% [de-identified] : 2/28/20 LM normal , Mid LAD 20% at prior stent , LCX OM1 25%,Mid RCA 99%\par \par 2/12/18  RAMUS   ,LAD stent 9/2017,   RCA stent  2/28/20

## 2022-03-08 ENCOUNTER — NON-APPOINTMENT (OUTPATIENT)
Age: 72
End: 2022-03-08

## 2022-05-12 ENCOUNTER — APPOINTMENT (OUTPATIENT)
Dept: CARDIOLOGY | Facility: CLINIC | Age: 72
End: 2022-05-12
Payer: MEDICARE

## 2022-05-12 ENCOUNTER — NON-APPOINTMENT (OUTPATIENT)
Age: 72
End: 2022-05-12

## 2022-05-12 VITALS
OXYGEN SATURATION: 96 % | SYSTOLIC BLOOD PRESSURE: 146 MMHG | HEART RATE: 72 BPM | RESPIRATION RATE: 16 BRPM | DIASTOLIC BLOOD PRESSURE: 70 MMHG | BODY MASS INDEX: 25.87 KG/M2 | HEIGHT: 63 IN | WEIGHT: 146 LBS

## 2022-05-12 VITALS — DIASTOLIC BLOOD PRESSURE: 70 MMHG | SYSTOLIC BLOOD PRESSURE: 128 MMHG

## 2022-05-12 PROCEDURE — 99214 OFFICE O/P EST MOD 30 MIN: CPT

## 2022-05-12 PROCEDURE — 93000 ELECTROCARDIOGRAM COMPLETE: CPT

## 2022-05-12 RX ORDER — ASPIRIN 81 MG
81 TABLET, DELAYED RELEASE (ENTERIC COATED) ORAL DAILY
Refills: 0 | Status: ACTIVE | COMMUNITY

## 2022-05-12 NOTE — REVIEW OF SYSTEMS
[Negative] : Heme/Lymph [SOB] : no shortness of breath [Dyspnea on exertion] : not dyspnea during exertion [Chest Discomfort] : no chest discomfort [Leg Claudication] : no intermittent leg claudication [Palpitations] : no palpitations [Syncope] : no syncope [Nausea] : no nausea [Change in Appetite] : no change in appetite [Constipation] : no constipation

## 2022-05-12 NOTE — HISTORY OF PRESENT ILLNESS
[FreeTextEntry1] : Patient  with history of hypertension, hyperlipidemia and coronary artery disease. status post stent placement 2006  LCX /2009 RCA .  LAD 9/2017 10/2017 , Intermediate artery  feb 2018 ,   RCA  stent  2/18/20 ,GERD came for follow up says he is doing well   no chest pain , patient did have mild reflux symptoms had endoscopy , was taking Pepcid for one month his symptoms improved , no recurrence of chest pain \par \par  patient recent blood work  glucose 101   , HbA1c 6.2 ,normal lipid profile  LDL 48  1/6/21\par \par Patient blood pressure is  controlled  after adding losartan  , hyperlipidemia controlled   patient had echo , nuclear stress showed fixed defect \par \par \par  \par

## 2022-05-12 NOTE — PHYSICAL EXAM
[Well Developed] : well developed [Well Nourished] : well nourished [No Acute Distress] : no acute distress [Normal Conjunctiva] : normal conjunctiva [Normal Venous Pressure] : normal venous pressure [Normal S1, S2] : normal S1, S2 [No Murmur] : no murmur [No Rub] : no rub [No Gallop] : no gallop [Clear Lung Fields] : clear lung fields [Good Air Entry] : good air entry [No Respiratory Distress] : no respiratory distress  [Soft] : abdomen soft [Non Tender] : non-tender [Normal Bowel Sounds] : normal bowel sounds [Normal Gait] : normal gait [No Edema] : no edema [No Cyanosis] : no cyanosis [No Clubbing] : no clubbing [No Varicosities] : no varicosities [No Rash] : no rash [No Skin Lesions] : no skin lesions [Moves all extremities] : moves all extremities [No Focal Deficits] : no focal deficits [Normal Speech] : normal speech [Alert and Oriented] : alert and oriented [Normal memory] : normal memory [de-identified] : erlin

## 2022-05-12 NOTE — REASON FOR VISIT
[Hyperlipidemia] : hyperlipidemia [Hypertension] : hypertension [Coronary Artery Disease] : coronary artery disease [Symptom and Test Evaluation] : symptom and test evaluation [Other: ____] : [unfilled]

## 2022-05-12 NOTE — DISCUSSION/SUMMARY
[Coronary Artery Disease] : coronary artery disease [Hyperlipidemia] : hyperlipidemia [Stable] : stable [Diet Modification] : diet modification [Exercise] : exercise [Weight Loss] : weight loss [<70] : goal is <70 [<150] : goal is <150 [>40] : goal is >40 [Patient] : the patient [Hypertension] : hypertension [FreeTextEntry1] : , \par \par \par GERD   resolved on PPI  stopped \par \par Patient with CAD with multiple intervention in multiple vessel , last 2/2018 intermediate branch   CA stent 2/28/20 ,  will 0btain chemical stress test  to rule out significant ischemia , echocardiogram to follow EF \par \par HTN  controlled  losartan 50 mg po daily , monitor renal function     low salt diet  patient will have blood work with primary  home BP monitoring , \par \par Hyperlipidemia : continue medication , target LDL <70 \par \par chronic  LBBB   continue monitor \par \par carotid  bruit : would obtain carotid doppler \par \par \par \par follow up after 4 months  spent  >30 minutes total reviewing blood work and discussion \par \par \par \par  \par \par \par \par \par

## 2022-05-12 NOTE — CARDIOLOGY SUMMARY
[de-identified] : 5/12/22  normal sinus rhythm LBBB  atypical  [de-identified] : 6/11/20 fixed anterior wall ,apical wall , inferior wall defect (diaphragmatic defect ) EF 57% apical wall hypokinesis  [de-identified] : 6/10/20  mild dilated aorta 4.0 cm , mild paradoxical septal motion with  apical hypokinesis EF 50-55% [de-identified] : 2/28/20 LM normal , Mid LAD 20% at prior stent , LCX OM1 25%,Mid RCA 99%\par \par 2/12/18  RAMUS   ,LAD stent 9/2017,   RCA stent  2/28/20

## 2022-05-16 ENCOUNTER — RX RENEWAL (OUTPATIENT)
Age: 72
End: 2022-05-16

## 2022-05-20 ENCOUNTER — APPOINTMENT (OUTPATIENT)
Dept: CARDIOLOGY | Facility: CLINIC | Age: 72
End: 2022-05-20
Payer: MEDICARE

## 2022-05-20 PROCEDURE — 93880 EXTRACRANIAL BILAT STUDY: CPT

## 2022-05-20 PROCEDURE — 93306 TTE W/DOPPLER COMPLETE: CPT

## 2022-06-06 NOTE — DISCHARGE NOTE ADULT - NS MD DC PLAN IMMU FLU PROVIDE INFO
1st attempt calling patient in regards of message. Art did not answer. Left message stating to call the office back. Risks/benefits discussed with patient or patient surrogate

## 2022-06-10 ENCOUNTER — RX RENEWAL (OUTPATIENT)
Age: 72
End: 2022-06-10

## 2022-07-07 ENCOUNTER — APPOINTMENT (OUTPATIENT)
Dept: CARDIOLOGY | Facility: CLINIC | Age: 72
End: 2022-07-07

## 2022-07-07 PROCEDURE — A9500: CPT

## 2022-07-07 PROCEDURE — 78452 HT MUSCLE IMAGE SPECT MULT: CPT

## 2022-07-07 PROCEDURE — 93015 CV STRESS TEST SUPVJ I&R: CPT

## 2022-07-12 ENCOUNTER — NON-APPOINTMENT (OUTPATIENT)
Age: 72
End: 2022-07-12

## 2022-07-16 NOTE — DISCHARGE NOTE ADULT - ADDITIONAL INSTRUCTIONS
Follow-up with your cardiologist in 1-2 weeks   ***************Do not stop you Aspirin or Plavix unless instructed to do so by your cardiologist.*****************
unknown

## 2022-09-08 ENCOUNTER — APPOINTMENT (OUTPATIENT)
Dept: CARDIOLOGY | Facility: CLINIC | Age: 72
End: 2022-09-08

## 2022-09-08 ENCOUNTER — NON-APPOINTMENT (OUTPATIENT)
Age: 72
End: 2022-09-08

## 2022-09-08 VITALS
OXYGEN SATURATION: 98 % | DIASTOLIC BLOOD PRESSURE: 58 MMHG | SYSTOLIC BLOOD PRESSURE: 118 MMHG | BODY MASS INDEX: 25.87 KG/M2 | RESPIRATION RATE: 16 BRPM | HEART RATE: 65 BPM | WEIGHT: 146 LBS | HEIGHT: 63 IN

## 2022-09-08 DIAGNOSIS — R09.89 OTHER SPECIFIED SYMPTOMS AND SIGNS INVOLVING THE CIRCULATORY AND RESPIRATORY SYSTEMS: ICD-10-CM

## 2022-09-08 PROCEDURE — 93000 ELECTROCARDIOGRAM COMPLETE: CPT

## 2022-09-08 PROCEDURE — 99214 OFFICE O/P EST MOD 30 MIN: CPT | Mod: 25

## 2022-09-08 NOTE — HISTORY OF PRESENT ILLNESS
[FreeTextEntry1] : Patient  with history of hypertension, hyperlipidemia and coronary artery disease. status post stent placement 2006  LCX /2009 RCA .  LAD 9/2017 10/2017 , Intermediate artery  feb 2018 ,   RCA  stent  2/18/20 ,GERD came for follow up says he is doing well   no chest pain ,\par \par patient did have mild reflux symptoms had endoscopy , was taking Pepcid for one month his symptoms improved , no recurrence of chest pain \par \par patient had carotid doppler , showed mild intimal thickening , echo showed EF 56% no significant change from prior \par \par  patient recent blood work  glucose 100   , HbA1c 6.2 ,normal lipid profile  LDL 50  7/15/22 \par \par Patient blood pressure is  controlled  after adding losartan  , hyperlipidemia controlled   \par \par \par patient had echo , nuclear stress showed fixed defect \par \par \par  \par

## 2022-09-08 NOTE — PHYSICAL EXAM
[Well Developed] : well developed [Well Nourished] : well nourished [No Acute Distress] : no acute distress [Normal Conjunctiva] : normal conjunctiva [Normal Venous Pressure] : normal venous pressure [Normal S1, S2] : normal S1, S2 [No Rub] : no rub [No Gallop] : no gallop [Clear Lung Fields] : clear lung fields [Good Air Entry] : good air entry [No Respiratory Distress] : no respiratory distress  [Soft] : abdomen soft [Non Tender] : non-tender [Normal Bowel Sounds] : normal bowel sounds [Normal Gait] : normal gait [No Edema] : no edema [No Cyanosis] : no cyanosis [No Clubbing] : no clubbing [No Varicosities] : no varicosities [No Rash] : no rash [No Skin Lesions] : no skin lesions [Moves all extremities] : moves all extremities [No Focal Deficits] : no focal deficits [Normal Speech] : normal speech [Alert and Oriented] : alert and oriented [Normal memory] : normal memory [Normal Rate] : normal [Normal S1] : normal S1 [Normal S2] : normal S2 [S3] : no S3 [S4] : no S4 [II] : a grade 2 [No Pitting Edema] : no pitting edema present [Right Carotid Bruit] : no bruit heard over the right carotid [Left Carotid Bruit] : no bruit heard over the left carotid [Right Femoral Bruit] : no bruit heard over the right femoral artery [Left Femoral Bruit] : no bruit heard over the left femoral artery [2+] : left 2+ [No Abnormalities] : the abdominal aorta was not enlarged and no bruit was heard [de-identified] : erlin

## 2022-09-08 NOTE — DISCUSSION/SUMMARY
[Coronary Artery Disease] : coronary artery disease [Hyperlipidemia] : hyperlipidemia [Stable] : stable [Diet Modification] : diet modification [Exercise] : exercise [Weight Loss] : weight loss [<70] : goal is <70 [<150] : goal is <150 [>40] : goal is >40 [Patient] : the patient [Hypertension] : hypertension [FreeTextEntry1] : , \par \par \par GERD   resolved on PPI  stopped \par \par Patient with CAD with multiple intervention in multiple vessel , last 2/2018 intermediate branch   CA stent 2/28/20 , stress test reveal fixed defect with decreased EF 45%  clinically compensated , echo showed same , however EF 56% \par \par HTN  hypertensive heart disease ,mild dilated aortic root  stable ,  controlled  losartan 50 mg po daily , monitor renal function     low salt diet  patient will have blood work with primary  home BP monitoring , \par \par Cardiac murmur carotid bruits  possibly from aortic sclerosis \par \par Hyperlipidemia : continue medication , target LDL <70 \par \par chronic  LBBB   continue monitor \par \par carotid  bruit : mild intimal thickening  no stenosis \par \par GERD   resolved on PPI  stopped \par \par \par \par \par followup after 4months \par  \par \par \par \par \par

## 2022-09-08 NOTE — CARDIOLOGY SUMMARY
[de-identified] : 9/8/22   normal sinus rhythm LBBB  atypical  [de-identified] : 7/7/22 fixed anterior wall ,apical wall , inferior wall defect (diaphragmatic defect ) EF 45% apical wall hypokinesis  [de-identified] : 5/20/22 mild dilated aorta 3.9 cm , Mild LVH Mild DD  mild paradoxical septal motion with  apical hypokinesis EF 50-55% [de-identified] : 2/28/20 LM normal , Mid LAD 20% at prior stent , LCX OM1 25%,Mid RCA 99%\par \par 2/12/18  RAMUS   ,LAD stent 9/2017,   RCA stent  2/28/20  [de-identified] : 5/20/22  mild intimal thickening

## 2022-10-17 ENCOUNTER — RX RENEWAL (OUTPATIENT)
Age: 72
End: 2022-10-17

## 2022-11-07 ENCOUNTER — RX RENEWAL (OUTPATIENT)
Age: 72
End: 2022-11-07

## 2022-11-14 NOTE — ED ADULT NURSE NOTE - NS TRANSFER DENTURES
Relevant Problems   No relevant active problems       Anesthetic History   No history of anesthetic complications            Review of Systems / Medical History  Patient summary reviewed, nursing notes reviewed and pertinent labs reviewed    Pulmonary        Sleep apnea: No treatment           Neuro/Psych   Within defined limits           Cardiovascular    Hypertension: well controlled                   GI/Hepatic/Renal  Within defined limits              Endo/Other    Diabetes: well controlled, type 2  Hypothyroidism: well controlled  Morbid obesity     Other Findings              Physical Exam    Airway  Mallampati: II  TM Distance: 4 - 6 cm  Neck ROM: normal range of motion   Mouth opening: Normal     Cardiovascular    Rhythm: regular  Rate: normal         Dental    Dentition: Poor dentition     Pulmonary  Breath sounds clear to auscultation               Abdominal  GI exam deferred       Other Findings            Anesthetic Plan    ASA: 3  Anesthesia type: general          Induction: Intravenous  Anesthetic plan and risks discussed with: Patient Partial/Upper

## 2022-12-15 ENCOUNTER — NON-APPOINTMENT (OUTPATIENT)
Age: 72
End: 2022-12-15

## 2022-12-15 ENCOUNTER — APPOINTMENT (OUTPATIENT)
Dept: CARDIOLOGY | Facility: CLINIC | Age: 72
End: 2022-12-15

## 2022-12-15 VITALS
DIASTOLIC BLOOD PRESSURE: 62 MMHG | BODY MASS INDEX: 25.9 KG/M2 | WEIGHT: 146.19 LBS | SYSTOLIC BLOOD PRESSURE: 134 MMHG | HEIGHT: 63 IN | OXYGEN SATURATION: 97 % | HEART RATE: 66 BPM

## 2022-12-15 VITALS
WEIGHT: 146 LBS | HEART RATE: 66 BPM | SYSTOLIC BLOOD PRESSURE: 126 MMHG | TEMPERATURE: 98.4 F | DIASTOLIC BLOOD PRESSURE: 60 MMHG | RESPIRATION RATE: 16 BRPM | HEIGHT: 63 IN | OXYGEN SATURATION: 97 % | BODY MASS INDEX: 25.87 KG/M2

## 2022-12-15 PROCEDURE — 99214 OFFICE O/P EST MOD 30 MIN: CPT | Mod: 25

## 2022-12-15 PROCEDURE — 93000 ELECTROCARDIOGRAM COMPLETE: CPT

## 2022-12-15 NOTE — PHYSICAL EXAM
[Well Developed] : well developed [Well Nourished] : well nourished [No Acute Distress] : no acute distress [Normal Conjunctiva] : normal conjunctiva [Normal Venous Pressure] : normal venous pressure [Normal S1, S2] : normal S1, S2 [No Rub] : no rub [No Gallop] : no gallop [Normal Rate] : normal [Normal S1] : normal S1 [Normal S2] : normal S2 [S3] : no S3 [S4] : no S4 [II] : a grade 2 [No Pitting Edema] : no pitting edema present [Right Carotid Bruit] : no bruit heard over the right carotid [Left Carotid Bruit] : no bruit heard over the left carotid [Right Femoral Bruit] : no bruit heard over the right femoral artery [Left Femoral Bruit] : no bruit heard over the left femoral artery [2+] : left 2+ [No Abnormalities] : the abdominal aorta was not enlarged and no bruit was heard [Clear Lung Fields] : clear lung fields [Good Air Entry] : good air entry [No Respiratory Distress] : no respiratory distress  [Soft] : abdomen soft [Non Tender] : non-tender [Normal Bowel Sounds] : normal bowel sounds [Normal Gait] : normal gait [No Edema] : no edema [No Cyanosis] : no cyanosis [No Clubbing] : no clubbing [No Varicosities] : no varicosities [No Rash] : no rash [No Skin Lesions] : no skin lesions [Moves all extremities] : moves all extremities [No Focal Deficits] : no focal deficits [Normal Speech] : normal speech [Alert and Oriented] : alert and oriented [Normal memory] : normal memory [de-identified] : erlin

## 2022-12-15 NOTE — REVIEW OF SYSTEMS
[SOB] : no shortness of breath [Dyspnea on exertion] : not dyspnea during exertion [Chest Discomfort] : no chest discomfort [Leg Claudication] : no intermittent leg claudication [Palpitations] : no palpitations [Syncope] : no syncope [Nausea] : no nausea [Change in Appetite] : no change in appetite [Constipation] : no constipation [Negative] : Heme/Lymph

## 2022-12-15 NOTE — DISCUSSION/SUMMARY
[Coronary Artery Disease] : coronary artery disease [Hyperlipidemia] : hyperlipidemia [Stable] : stable [Diet Modification] : diet modification [Exercise] : exercise [Weight Loss] : weight loss [<70] : goal is <70 [<150] : goal is <150 [>40] : goal is >40 [Patient] : the patient [Hypertension] : hypertension [FreeTextEntry1] : \par \par Patient with CAD with multiple intervention in multiple vessel , last 2/2018 intermediate branch   CA stent 2/28/20 , stress test reveal fixed defect with decreased EF 45%  clinically compensated , echo showed same , however EF 56% \par \par HTN  hypertensive heart disease ,mild dilated aortic root  stable ,  controlled  losartan 50 mg po daily , monitor renal function     low salt diet  patient will have blood work with primary  home BP monitoring , \par \par Cardiac murmur carotid bruits  possibly from aortic sclerosis \par \par Hyperlipidemia : continue medication , target LDL <70 \par \par chronic  LBBB   continue monitor \par \par carotid  bruit : mild intimal thickening  no stenosis \par \par GERD   resolved on PPI  stopped \par \par \par \par \par \par \par followup after 4months \par  \par \par \par \par \par

## 2022-12-15 NOTE — HISTORY OF PRESENT ILLNESS
[FreeTextEntry1] : Patient  with history of hypertension, hyperlipidemia and coronary artery disease. status post stent placement 2006  LCX /2009 RCA .  LAD 9/2017 10/2017 , Intermediate artery  feb 2018 ,   RCA  stent  2/18/20 ,GERD came for follow up says he is fine , \par \par Patient denies any chest pain or shortness of breath or dizziness or palpitation , \par \par patient did have mild reflux symptoms had endoscopy , was taking Pepcid for one month his symptoms improved , no recurrence of chest pain , occasionally occurs when he eat  certain type of food , \par \par patient had carotid doppler , showed mild intimal thickening , echo showed EF 56% no significant change from prior \par  patient recent blood work  glucose 100   , HbA1c 6.2 ,normal lipid profile  LDL 50  7/15/22 \par \par Patient blood pressure is  controlled  after adding losartan  , hyperlipidemia controlled   \par \par \par patient had echo , nuclear stress showed fixed defect \par \par \par  \par

## 2022-12-15 NOTE — CARDIOLOGY SUMMARY
[de-identified] : 12/15/22    normal sinus rhythm LBBB  atypical  [de-identified] : 7/7/22 fixed anterior wall ,apical wall , inferior wall defect (diaphragmatic defect ) EF 45% apical wall hypokinesis  [de-identified] : 5/20/22 mild dilated aorta 3.9 cm , Mild LVH Mild DD  mild paradoxical septal motion with  apical hypokinesis EF 50-55% [de-identified] : 2/28/20 LM normal , Mid LAD 20% at prior stent , LCX OM1 25%,Mid RCA 99%\par \par 2/12/18  RAMUS   ,LAD stent 9/2017,   RCA stent  2/28/20  [de-identified] : 5/20/22  mild intimal thickening

## 2023-03-02 NOTE — CONSULT NOTE ADULT - REASON FOR ADMISSION
Called pt states that she already picked up medication from pharmacy and has no other questions. Advised pt that she must keep appt as scheduled for 06/05/19 for additional refills. Pt verbalizes understanding.    CP Chest pressure/burning Patient requests all Lab, Cardiology, and Radiology Results on their Discharge Instructions

## 2023-03-09 ENCOUNTER — APPOINTMENT (OUTPATIENT)
Dept: CARDIOLOGY | Facility: CLINIC | Age: 73
End: 2023-03-09
Payer: MEDICARE

## 2023-03-09 ENCOUNTER — NON-APPOINTMENT (OUTPATIENT)
Age: 73
End: 2023-03-09

## 2023-03-09 VITALS
SYSTOLIC BLOOD PRESSURE: 140 MMHG | HEART RATE: 73 BPM | OXYGEN SATURATION: 98 % | BODY MASS INDEX: 26.75 KG/M2 | TEMPERATURE: 97.7 F | HEIGHT: 63 IN | DIASTOLIC BLOOD PRESSURE: 66 MMHG | WEIGHT: 151 LBS

## 2023-03-09 PROCEDURE — 93000 ELECTROCARDIOGRAM COMPLETE: CPT

## 2023-03-09 PROCEDURE — 99214 OFFICE O/P EST MOD 30 MIN: CPT

## 2023-03-09 NOTE — PHYSICAL EXAM
[Well Developed] : well developed [Well Nourished] : well nourished [No Acute Distress] : no acute distress [Normal Conjunctiva] : normal conjunctiva [Normal Venous Pressure] : normal venous pressure [Normal S1, S2] : normal S1, S2 [No Rub] : no rub [No Gallop] : no gallop [Normal Rate] : normal [Normal S1] : normal S1 [Normal S2] : normal S2 [II] : a grade 2 [No Pitting Edema] : no pitting edema present [2+] : left 2+ [No Abnormalities] : the abdominal aorta was not enlarged and no bruit was heard [Clear Lung Fields] : clear lung fields [Good Air Entry] : good air entry [No Respiratory Distress] : no respiratory distress  [Soft] : abdomen soft [Non Tender] : non-tender [Normal Bowel Sounds] : normal bowel sounds [Normal Gait] : normal gait [No Edema] : no edema [No Cyanosis] : no cyanosis [No Clubbing] : no clubbing [No Varicosities] : no varicosities [No Rash] : no rash [No Skin Lesions] : no skin lesions [Moves all extremities] : moves all extremities [No Focal Deficits] : no focal deficits [Normal Speech] : normal speech [Alert and Oriented] : alert and oriented [Normal memory] : normal memory [S3] : no S3 [S4] : no S4 [Right Carotid Bruit] : no bruit heard over the right carotid [Left Carotid Bruit] : no bruit heard over the left carotid [Right Femoral Bruit] : no bruit heard over the right femoral artery [Left Femoral Bruit] : no bruit heard over the left femoral artery [de-identified] : erlin

## 2023-03-09 NOTE — CARDIOLOGY SUMMARY
[de-identified] : 3/9/23   normal sinus rhythm LBBB  atypical  [de-identified] : 7/7/22 fixed anterior wall ,apical wall , inferior wall defect (diaphragmatic defect ) EF 45% apical wall hypokinesis  [de-identified] : 5/20/22 mild dilated aorta 3.9 cm , Mild LVH Mild DD  mild paradoxical septal motion with  apical hypokinesis EF 50-55% [de-identified] : 2/28/20 LM normal , Mid LAD 20% at prior stent , LCX OM1 25%,Mid RCA 99%\par \par 2/12/18  RAMUS   ,LAD stent 9/2017,   RCA stent  2/28/20  [de-identified] : 5/20/22  mild intimal thickening

## 2023-03-09 NOTE — DISCUSSION/SUMMARY
[Coronary Artery Disease] : coronary artery disease [Hyperlipidemia] : hyperlipidemia [Stable] : stable [Diet Modification] : diet modification [Exercise] : exercise [Weight Loss] : weight loss [<70] : goal is <70 [<150] : goal is <150 [>40] : goal is >40 [Patient] : the patient [Hypertension] : hypertension [FreeTextEntry1] : \par \par Patient with CAD with multiple intervention in multiple vessel , last 2/2018 intermediate branch   CA stent 2/28/20 , stress test reveal fixed defect with decreased EF 45%  clinically compensated , echo showed same , however EF 56% \par \par HTN  hypertensive heart disease ,mild dilated aortic root  stable ,  controlled  losartan 50 mg po daily , monitor renal function    , encourage patient to follow  low salt diet , home BP monitoring , \par \par Cardiac murmur carotid bruits  possibly from aortic sclerosis \par \par Hyperlipidemia : continue medication , target LDL <70 \par \par chronic  LBBB   continue monitor \par \par carotid  bruit : mild intimal thickening  no stenosis \par \par cough  : likely GERD  vs allergies ,  continue PPI  protonix 40 mg po daily \par \par followup after 4 months \par  \par \par \par \par \par

## 2023-03-09 NOTE — HISTORY OF PRESENT ILLNESS
[FreeTextEntry1] : Patient  with history of hypertension, hyperlipidemia and coronary artery disease. status post stent placement 2006  LCX /2009 RCA .  LAD 9/2017 10/2017 , Intermediate artery  feb 2018 ,   RCA  stent  2/18/20 ,GERD came for follow up says he is fine , other than  mild cough with whitish scanty , not related to meal , no wheezing no sob \par \par Patient denies any chest pain or shortness of breath or dizziness or palpitation , \par \par patient did have mild reflux symptoms had endoscopy , no recurrence of chest pain , occasionally occurs when he eat  certain type of food , \par \par patient had carotid doppler , showed mild intimal thickening , echo showed EF 56% no significant change from prior \par  patient recent blood work  glucose 109   , HbA1c 6.2 ,normal lipid profile  LDL 48  feb 2023 \par \par Patient blood pressure is minimally elevated   , hyperlipidemia controlled   \par \par \par patient had echo , nuclear stress showed fixed defect \par \par \par  \par

## 2023-03-17 ENCOUNTER — RX RENEWAL (OUTPATIENT)
Age: 73
End: 2023-03-17

## 2023-04-10 ENCOUNTER — APPOINTMENT (OUTPATIENT)
Dept: CARDIOLOGY | Facility: CLINIC | Age: 73
End: 2023-04-10
Payer: MEDICARE

## 2023-04-10 ENCOUNTER — NON-APPOINTMENT (OUTPATIENT)
Age: 73
End: 2023-04-10

## 2023-04-10 VITALS
BODY MASS INDEX: 26.37 KG/M2 | TEMPERATURE: 97.5 F | HEIGHT: 63 IN | HEART RATE: 72 BPM | DIASTOLIC BLOOD PRESSURE: 62 MMHG | OXYGEN SATURATION: 98 % | SYSTOLIC BLOOD PRESSURE: 152 MMHG | WEIGHT: 148.81 LBS

## 2023-04-10 PROCEDURE — 93000 ELECTROCARDIOGRAM COMPLETE: CPT

## 2023-04-10 PROCEDURE — 99214 OFFICE O/P EST MOD 30 MIN: CPT

## 2023-04-10 NOTE — CARDIOLOGY SUMMARY
[de-identified] : 4/10/23   normal sinus rhythm LBBB  atypical  [de-identified] : 7/7/22 fixed anterior wall ,apical wall , inferior wall defect (diaphragmatic defect ) EF 45% apical wall hypokinesis  [de-identified] : 5/20/22 mild dilated aorta 3.9 cm , Mild LVH Mild DD  mild paradoxical septal motion with  apical hypokinesis EF 50-55% [de-identified] : 2/28/20 LM normal , Mid LAD 20% at prior stent , LCX OM1 25%,Mid RCA 99%\par \par 2/12/18  RAMUS   ,LAD stent 9/2017,   RCA stent  2/28/20  [de-identified] : 5/20/22  mild intimal thickening

## 2023-04-10 NOTE — PHYSICAL EXAM
[Well Developed] : well developed [Well Nourished] : well nourished [No Acute Distress] : no acute distress [Normal Conjunctiva] : normal conjunctiva [Normal Venous Pressure] : normal venous pressure [Normal S1, S2] : normal S1, S2 [No Rub] : no rub [No Gallop] : no gallop [Normal Rate] : normal [Normal S1] : normal S1 [Normal S2] : normal S2 [S3] : no S3 [S4] : no S4 [II] : a grade 2 [No Pitting Edema] : no pitting edema present [Right Carotid Bruit] : no bruit heard over the right carotid [Left Carotid Bruit] : no bruit heard over the left carotid [Right Femoral Bruit] : no bruit heard over the right femoral artery [Left Femoral Bruit] : no bruit heard over the left femoral artery [2+] : left 2+ [No Abnormalities] : the abdominal aorta was not enlarged and no bruit was heard [Clear Lung Fields] : clear lung fields [Good Air Entry] : good air entry [No Respiratory Distress] : no respiratory distress  [Soft] : abdomen soft [Non Tender] : non-tender [Normal Bowel Sounds] : normal bowel sounds [Normal Gait] : normal gait [No Edema] : no edema [No Cyanosis] : no cyanosis [No Clubbing] : no clubbing [No Varicosities] : no varicosities [No Rash] : no rash [No Skin Lesions] : no skin lesions [Moves all extremities] : moves all extremities [No Focal Deficits] : no focal deficits [Normal Speech] : normal speech [Alert and Oriented] : alert and oriented [Normal memory] : normal memory [de-identified] : erlin

## 2023-04-10 NOTE — HISTORY OF PRESENT ILLNESS
[FreeTextEntry1] : Patient  with history of hypertension, hyperlipidemia and coronary artery disease. status post stent placement 2006  LCX /2009 RCA .  LAD 9/2017 10/2017 , Intermediate artery  feb 2018 ,   RCA  stent  2/18/20 ,GERD came with complain that he had few episodes of left sided chest pain for last two days , can localized finger tip size , different at different location lasts for few seconds on and off for few seconds , not associated  shortness of breath or palpitations or dizziness  , not related to exertion ,  patient did stop taking PPI , patient did eat Pizza , \par \par \par \par \par patient did have mild reflux symptoms had endoscopy , no recurrence of chest pain , occasionally occurs when he eat  certain type of food , \par \par patient had carotid doppler , showed mild intimal thickening , echo showed EF 56% no significant change from prior \par  patient recent blood work  glucose 109   , HbA1c 6.2 ,normal lipid profile  LDL 48  feb 2023 \par \par Patient blood pressure is minimally elevated   , hyperlipidemia controlled   \par \par \par patient had echo , nuclear stress showed fixed defect \par \par \par  \par

## 2023-04-10 NOTE — DISCUSSION/SUMMARY
[Coronary Artery Disease] : coronary artery disease [Hyperlipidemia] : hyperlipidemia [Stable] : stable [Diet Modification] : diet modification [Exercise] : exercise [Weight Loss] : weight loss [<70] : goal is <70 [<150] : goal is <150 [>40] : goal is >40 [Patient] : the patient [Hypertension] : hypertension [FreeTextEntry1] : \par Atypical chest pain  : Patient with CAD with multiple intervention in multiple vessel , last 2/2018 intermediate branch   RCA stent 2/28/20   atypical , ? GERD will give protonix , will obtain echo , chemical stress test \par \par HTN  hypertensive heart disease ,mild dilated aortic root  stable ,  controlled  losartan 50 mg po daily , monitor renal function    , encourage patient to follow  low salt diet , home BP monitoring , \par \par Cardiac murmur carotid bruits  possibly from aortic sclerosis \par \par Hyperlipidemia : continue medication , target LDL <70 \par \par chronic  LBBB   continue monitor \par \par carotid  bruit : mild intimal thickening  no stenosis \par \par  \par \par followup 1 month \par  \par \par \par \par \par

## 2023-04-20 ENCOUNTER — APPOINTMENT (OUTPATIENT)
Dept: CARDIOLOGY | Facility: CLINIC | Age: 73
End: 2023-04-20
Payer: MEDICARE

## 2023-04-20 PROCEDURE — 93306 TTE W/DOPPLER COMPLETE: CPT

## 2023-04-27 ENCOUNTER — APPOINTMENT (OUTPATIENT)
Dept: CARDIOLOGY | Facility: CLINIC | Age: 73
End: 2023-04-27
Payer: MEDICARE

## 2023-04-27 PROCEDURE — A9500: CPT

## 2023-04-27 PROCEDURE — 78452 HT MUSCLE IMAGE SPECT MULT: CPT

## 2023-04-27 PROCEDURE — 93016 CV STRESS TEST SUPVJ ONLY: CPT

## 2023-04-27 PROCEDURE — 93017 CV STRESS TEST TRACING ONLY: CPT

## 2023-04-27 PROCEDURE — 93018 CV STRESS TEST I&R ONLY: CPT

## 2023-05-01 ENCOUNTER — APPOINTMENT (OUTPATIENT)
Dept: CARDIOLOGY | Facility: CLINIC | Age: 73
End: 2023-05-01
Payer: MEDICARE

## 2023-05-01 VITALS
OXYGEN SATURATION: 97 % | BODY MASS INDEX: 27.11 KG/M2 | HEART RATE: 74 BPM | RESPIRATION RATE: 16 BRPM | DIASTOLIC BLOOD PRESSURE: 64 MMHG | SYSTOLIC BLOOD PRESSURE: 128 MMHG | HEIGHT: 63 IN | WEIGHT: 153 LBS

## 2023-05-01 VITALS
DIASTOLIC BLOOD PRESSURE: 60 MMHG | BODY MASS INDEX: 27.11 KG/M2 | OXYGEN SATURATION: 97 % | SYSTOLIC BLOOD PRESSURE: 140 MMHG | HEART RATE: 74 BPM | HEIGHT: 63 IN | WEIGHT: 153 LBS

## 2023-05-01 DIAGNOSIS — R07.89 OTHER CHEST PAIN: ICD-10-CM

## 2023-05-01 PROCEDURE — 99214 OFFICE O/P EST MOD 30 MIN: CPT

## 2023-05-01 NOTE — PHYSICAL EXAM
[S3] : no S3 [S4] : no S4 [Right Carotid Bruit] : no bruit heard over the right carotid [Left Carotid Bruit] : no bruit heard over the left carotid [Right Femoral Bruit] : no bruit heard over the right femoral artery [Left Femoral Bruit] : no bruit heard over the left femoral artery [de-identified] : erlin

## 2023-05-01 NOTE — REVIEW OF SYSTEMS
[SOB] : no shortness of breath [Dyspnea on exertion] : not dyspnea during exertion [Chest Discomfort] : no chest discomfort [Leg Claudication] : no intermittent leg claudication [Palpitations] : no palpitations [Syncope] : no syncope [Nausea] : no nausea [Change in Appetite] : no change in appetite [Constipation] : no constipation

## 2023-05-01 NOTE — CARDIOLOGY SUMMARY
[de-identified] : 4/10/23   normal sinus rhythm LBBB  atypical  [de-identified] : 4/27/23  medium size mild  fixed mid inferior ,apical  inferior wall wall , inferior wall defect  suggestive infarct without ischemia  EF 54%  [de-identified] : 4/20/22 mild dilated aorta 3.9 cm , Mild LVH Mild DD EF 55-60% [de-identified] : 2/28/20 LM normal , Mid LAD 20% at prior stent , LCX OM1 25%,Mid RCA 99%\par \par 2/12/18  RAMUS   ,LAD stent 9/2017,   RCA stent  2/28/20  [de-identified] : 5/20/22  mild intimal thickening  left

## 2023-05-01 NOTE — DISCUSSION/SUMMARY
[FreeTextEntry1] : \par Atypical chest pain  : resolved on on resuming PPI , no evidence of ischemia on stress test , normal EF on echo , Patient had  CAD with multiple intervention in multiple vessel , last 2/2018 intermediate branch   RCA stent 2/28/20   likely from ,  GERD will continue  Protonix , \par \par HTN  hypertensive heart disease ,mild dilated aortic root  stable ,  controlled  losartan 50 mg po daily , monitor renal function    , encourage patient to follow  low salt diet , home BP monitoring , \par \par Cardiac murmur carotid bruits  possibly from aortic sclerosis \par \par Hyperlipidemia : continue medication , target LDL <70 \par \par chronic  LBBB   continue monitor \par \par carotid  bruit : mild intimal thickening  no stenosis \par \par  \par \par followup 3 months \par  \par \par \par \par \par

## 2023-05-25 ENCOUNTER — RX RENEWAL (OUTPATIENT)
Age: 73
End: 2023-05-25

## 2023-07-14 ENCOUNTER — APPOINTMENT (OUTPATIENT)
Dept: CARDIOLOGY | Facility: CLINIC | Age: 73
End: 2023-07-14

## 2023-08-03 ENCOUNTER — APPOINTMENT (OUTPATIENT)
Dept: CARDIOLOGY | Facility: CLINIC | Age: 73
End: 2023-08-03
Payer: MEDICARE

## 2023-08-03 VITALS
OXYGEN SATURATION: 98 % | HEART RATE: 76 BPM | WEIGHT: 148 LBS | SYSTOLIC BLOOD PRESSURE: 144 MMHG | HEIGHT: 63 IN | DIASTOLIC BLOOD PRESSURE: 66 MMHG | BODY MASS INDEX: 26.22 KG/M2

## 2023-08-03 DIAGNOSIS — R01.1 CARDIAC MURMUR, UNSPECIFIED: ICD-10-CM

## 2023-08-03 DIAGNOSIS — I25.10 ATHEROSCLEROTIC HEART DISEASE OF NATIVE CORONARY ARTERY W/OUT ANGINA PECTORIS: ICD-10-CM

## 2023-08-03 PROCEDURE — 93000 ELECTROCARDIOGRAM COMPLETE: CPT

## 2023-08-03 PROCEDURE — 99214 OFFICE O/P EST MOD 30 MIN: CPT

## 2023-08-03 NOTE — CARDIOLOGY SUMMARY
[de-identified] : 8/3/23  normal sinus rhythm LBBB  atypical  [de-identified] : 4/27/23  medium size mild  fixed mid inferior ,apical  inferior wall wall , inferior wall defect  suggestive infarct without ischemia  EF 54%  [de-identified] : 4/20/22 mild dilated aorta 3.9 cm , Mild LVH Mild DD EF 55-60% [de-identified] : 2/28/20 LM normal , Mid LAD 20% at prior stent , LCX OM1 25%,Mid RCA 99%\par  \par  2/12/18  RAMUS   ,LAD stent 9/2017,   RCA stent  2/28/20  [de-identified] : 5/20/22  mild intimal thickening

## 2023-08-03 NOTE — PHYSICAL EXAM
[Well Developed] : well developed [Well Nourished] : well nourished [No Acute Distress] : no acute distress [Normal Conjunctiva] : normal conjunctiva [Normal Venous Pressure] : normal venous pressure [Normal S1, S2] : normal S1, S2 [No Rub] : no rub [No Gallop] : no gallop [Normal Rate] : normal [Normal S1] : normal S1 [Normal S2] : normal S2 [S3] : no S3 [S4] : no S4 [II] : a grade 2 [No Pitting Edema] : no pitting edema present [Right Carotid Bruit] : no bruit heard over the right carotid [Left Carotid Bruit] : no bruit heard over the left carotid [Right Femoral Bruit] : no bruit heard over the right femoral artery [Left Femoral Bruit] : no bruit heard over the left femoral artery [2+] : left 2+ [No Abnormalities] : the abdominal aorta was not enlarged and no bruit was heard [Clear Lung Fields] : clear lung fields [Good Air Entry] : good air entry [No Respiratory Distress] : no respiratory distress  [Soft] : abdomen soft [Non Tender] : non-tender [Normal Bowel Sounds] : normal bowel sounds [Normal Gait] : normal gait [No Edema] : no edema [No Cyanosis] : no cyanosis [No Clubbing] : no clubbing [No Varicosities] : no varicosities [No Rash] : no rash [No Skin Lesions] : no skin lesions [Moves all extremities] : moves all extremities [No Focal Deficits] : no focal deficits [Normal Speech] : normal speech [Alert and Oriented] : alert and oriented [Normal memory] : normal memory [de-identified] : erlin

## 2023-08-03 NOTE — DISCUSSION/SUMMARY
[Coronary Artery Disease] : coronary artery disease [Hyperlipidemia] : hyperlipidemia [Stable] : stable [Diet Modification] : diet modification [Exercise] : exercise [Weight Loss] : weight loss [<70] : goal is <70 [<150] : goal is <150 [>40] : goal is >40 [Patient] : the patient [Hypertension] : hypertension [FreeTextEntry1] : \par  Atypical chest pain  : resolved on on resuming PPI , no evidence of ischemia on stress test , normal EF on echo , Patient had  CAD with multiple intervention in multiple vessel , last 2/2018 intermediate branch   RCA stent 2/28/20   likely from ,  GERD will continue  Protonix , \par  \par  HTN  hypertensive heart disease ,mild dilated aortic root  stable ,  controlled  losartan 50 mg po daily , monitor renal function    , encourage patient to follow  low salt diet , home BP monitoring , \par  \par  Cardiac murmur carotid bruits  possibly from aortic sclerosis \par  \par  Hyperlipidemia : continue medication , target LDL <70 \par  \par  chronic  LBBB   continue monitor \par  \par  carotid  bruit : mild intimal thickening  no stenosis \par  \par   \par  \par  followup 3 months \par   \par  \par  \par  \par  \par   [EKG obtained to assist in diagnosis and management of assessed problem(s)] : EKG obtained to assist in diagnosis and management of assessed problem(s)

## 2023-08-03 NOTE — HISTORY OF PRESENT ILLNESS
[FreeTextEntry1] : Patient  with history of hypertension, hyperlipidemia and coronary artery disease. status post stent placement 2006  LCX /2009 RCA .  LAD 9/2017 10/2017 , Intermediate artery  feb 2018 ,   RCA  stent  2/18/20 ,GERD came for follow up says he is feeling fine ,except aches in both legs while standing , has varicose vein , no hx of  back pain , not related to walk  , ,  Patient had  arterial doppler of LE , no evidence of  signfiicant disease seen , done  july 11 2023 at [facility] , denies any chest pain or shortness of breath or dizziness ,  physically active.  Patient had cho showed mild LVH normal EF mild DD , no evidence of ischemia on chemical stress test , fixed mid , apical inferior wall infarct    patient did have mild reflux symptoms had endoscopy , no recurrence of chest pain , occasionally occurs when he eat  certain type of food ,   patient had carotid doppler , showed mild intimal thickening , echo showed EF 56% no significant change from prior   patient recent blood work  glucose 109   , HbA1c 6.2 ,normal lipid profile  LDL 44  july 1 2023   Patient blood pressure is minimally elevated   , hyperlipidemia controlled

## 2023-09-07 ENCOUNTER — OFFICE (OUTPATIENT)
Dept: URBAN - METROPOLITAN AREA CLINIC 70 | Facility: CLINIC | Age: 73
Setting detail: OPHTHALMOLOGY
End: 2023-09-07
Payer: MEDICARE

## 2023-09-07 DIAGNOSIS — H01.005: ICD-10-CM

## 2023-09-07 DIAGNOSIS — H16.223: ICD-10-CM

## 2023-09-07 DIAGNOSIS — H01.004: ICD-10-CM

## 2023-09-07 DIAGNOSIS — H01.001: ICD-10-CM

## 2023-09-07 DIAGNOSIS — H01.002: ICD-10-CM

## 2023-09-07 PROCEDURE — 83861 MICROFLUID ANALY TEARS: CPT | Performed by: OPHTHALMOLOGY

## 2023-09-07 PROCEDURE — 92014 COMPRE OPH EXAM EST PT 1/>: CPT | Performed by: OPHTHALMOLOGY

## 2023-09-07 ASSESSMENT — VISUAL ACUITY
OS_BCVA: 20/20
OD_BCVA: 20/25

## 2023-09-07 ASSESSMENT — KERATOMETRY
OD_K1POWER_DIOPTERS: 44.00
OS_K1POWER_DIOPTERS: 44.50
OS_AXISANGLE_DEGREES: 108
OS_K2POWER_DIOPTERS: 45.25
OD_K2POWER_DIOPTERS: 44.75
OD_AXISANGLE_DEGREES: 007

## 2023-09-07 ASSESSMENT — AXIALLENGTH_DERIVED
OD_AL: 22.9939
OS_AL: 22.9122

## 2023-09-07 ASSESSMENT — CONFRONTATIONAL VISUAL FIELD TEST (CVF)
OD_FINDINGS: FULL
OS_FINDINGS: FULL

## 2023-09-07 ASSESSMENT — REFRACTION_AUTOREFRACTION
OS_SPHERE: +0.75
OD_AXIS: 087
OD_SPHERE: +1.25
OS_CYLINDER: -0.50
OD_CYLINDER: -1.00
OS_AXIS: 048

## 2023-09-07 ASSESSMENT — LID POSITION - DERMATOCHALASIS
OD_DERMATOCHALASIS: RUL 3+
OS_DERMATOCHALASIS: LUL 2+

## 2023-09-07 ASSESSMENT — LID EXAM ASSESSMENTS
OD_DERMATOCHALASIS: 3+
OS_BLEPHARITIS: LLL LUL 2+
OD_BLEPHARITIS: RLL RUL 2+
OS_TRICHIASIS: 1+
OS_DERMATOCHALASIS: 3+

## 2023-09-07 ASSESSMENT — SPHEQUIV_DERIVED
OS_SPHEQUIV: 0.5
OD_SPHEQUIV: 0.75

## 2023-09-07 ASSESSMENT — SUPERFICIAL PUNCTATE KERATITIS (SPK)
OS_SPK: 3+
OD_SPK: 3+

## 2023-10-25 ENCOUNTER — APPOINTMENT (OUTPATIENT)
Dept: VASCULAR SURGERY | Facility: CLINIC | Age: 73
End: 2023-10-25
Payer: MEDICARE

## 2023-10-25 VITALS
OXYGEN SATURATION: 97 % | DIASTOLIC BLOOD PRESSURE: 73 MMHG | SYSTOLIC BLOOD PRESSURE: 146 MMHG | HEIGHT: 64 IN | HEART RATE: 68 BPM | WEIGHT: 148 LBS | BODY MASS INDEX: 25.27 KG/M2

## 2023-10-25 DIAGNOSIS — M79.662 PAIN IN RIGHT LOWER LEG: ICD-10-CM

## 2023-10-25 DIAGNOSIS — M79.661 PAIN IN RIGHT LOWER LEG: ICD-10-CM

## 2023-10-25 PROCEDURE — 99203 OFFICE O/P NEW LOW 30 MIN: CPT

## 2023-10-25 PROCEDURE — 93970 EXTREMITY STUDY: CPT

## 2023-12-07 ENCOUNTER — NON-APPOINTMENT (OUTPATIENT)
Age: 73
End: 2023-12-07

## 2023-12-07 ENCOUNTER — APPOINTMENT (OUTPATIENT)
Dept: CARDIOLOGY | Facility: CLINIC | Age: 73
End: 2023-12-07
Payer: MEDICARE

## 2023-12-07 VITALS
OXYGEN SATURATION: 97 % | HEIGHT: 64 IN | SYSTOLIC BLOOD PRESSURE: 140 MMHG | HEART RATE: 70 BPM | WEIGHT: 150 LBS | DIASTOLIC BLOOD PRESSURE: 60 MMHG | BODY MASS INDEX: 25.61 KG/M2

## 2023-12-07 DIAGNOSIS — J40 BRONCHITIS, NOT SPECIFIED AS ACUTE OR CHRONIC: ICD-10-CM

## 2023-12-07 DIAGNOSIS — K21.9 GASTRO-ESOPHAGEAL REFLUX DISEASE W/OUT ESOPHAGITIS: ICD-10-CM

## 2023-12-07 PROCEDURE — 99214 OFFICE O/P EST MOD 30 MIN: CPT

## 2023-12-07 PROCEDURE — 93000 ELECTROCARDIOGRAM COMPLETE: CPT

## 2023-12-13 ENCOUNTER — RX RENEWAL (OUTPATIENT)
Age: 73
End: 2023-12-13

## 2024-03-07 NOTE — REASON FOR VISIT
[Symptom and Test Evaluation] : symptom and test evaluation [Hyperlipidemia] : hyperlipidemia [Hypertension] : hypertension [Other: ____] : [unfilled] [Coronary Artery Disease] : coronary artery disease

## 2024-03-08 ENCOUNTER — NON-APPOINTMENT (OUTPATIENT)
Age: 74
End: 2024-03-08

## 2024-03-08 ENCOUNTER — APPOINTMENT (OUTPATIENT)
Dept: CARDIOLOGY | Facility: CLINIC | Age: 74
End: 2024-03-08
Payer: MEDICARE

## 2024-03-08 VITALS
DIASTOLIC BLOOD PRESSURE: 66 MMHG | OXYGEN SATURATION: 98 % | WEIGHT: 149 LBS | BODY MASS INDEX: 25.44 KG/M2 | SYSTOLIC BLOOD PRESSURE: 140 MMHG | HEART RATE: 66 BPM | HEIGHT: 64 IN

## 2024-03-08 DIAGNOSIS — R94.31 ABNORMAL ELECTROCARDIOGRAM [ECG] [EKG]: ICD-10-CM

## 2024-03-08 DIAGNOSIS — I25.10 ATHEROSCLEROTIC HEART DISEASE OF NATIVE CORONARY ARTERY W/OUT ANGINA PECTORIS: ICD-10-CM

## 2024-03-08 DIAGNOSIS — I10 ESSENTIAL (PRIMARY) HYPERTENSION: ICD-10-CM

## 2024-03-08 DIAGNOSIS — E78.5 HYPERLIPIDEMIA, UNSPECIFIED: ICD-10-CM

## 2024-03-08 DIAGNOSIS — I83.813 VARICOSE VEINS OF BILATERAL LOWER EXTREMITIES WITH PAIN: ICD-10-CM

## 2024-03-08 PROCEDURE — 93000 ELECTROCARDIOGRAM COMPLETE: CPT

## 2024-03-08 PROCEDURE — 99214 OFFICE O/P EST MOD 30 MIN: CPT

## 2024-03-08 PROCEDURE — G2211 COMPLEX E/M VISIT ADD ON: CPT

## 2024-03-08 RX ORDER — PANTOPRAZOLE 40 MG/1
40 TABLET, DELAYED RELEASE ORAL
Qty: 90 | Refills: 1 | Status: ACTIVE | COMMUNITY
Start: 2023-04-10 | End: 1900-01-01

## 2024-03-08 RX ORDER — ROSUVASTATIN CALCIUM 10 MG/1
10 TABLET, FILM COATED ORAL
Qty: 90 | Refills: 1 | Status: ACTIVE | COMMUNITY
Start: 2021-05-03 | End: 1900-01-01

## 2024-03-08 RX ORDER — LOSARTAN POTASSIUM 50 MG/1
50 TABLET, FILM COATED ORAL
Qty: 90 | Refills: 1 | Status: ACTIVE | COMMUNITY
Start: 2020-11-12 | End: 1900-01-01

## 2024-03-08 RX ORDER — ROSUVASTATIN CALCIUM 5 MG/1
5 TABLET, FILM COATED ORAL
Qty: 90 | Refills: 1 | Status: ACTIVE | COMMUNITY
Start: 2020-05-29 | End: 1900-01-01

## 2024-03-08 RX ORDER — CLOPIDOGREL BISULFATE 75 MG/1
75 TABLET, FILM COATED ORAL
Qty: 90 | Refills: 1 | Status: ACTIVE | COMMUNITY
Start: 2017-10-26 | End: 1900-01-01

## 2024-03-08 NOTE — PHYSICAL EXAM
[Well Developed] : well developed [Well Nourished] : well nourished [Normal Conjunctiva] : normal conjunctiva [No Acute Distress] : no acute distress [Normal S1, S2] : normal S1, S2 [Normal Venous Pressure] : normal venous pressure [No Gallop] : no gallop [No Rub] : no rub [Normal S1] : normal S1 [Normal Rate] : normal [Normal S2] : normal S2 [II] : a grade 2 [No Pitting Edema] : no pitting edema present [2+] : left 2+ [No Abnormalities] : the abdominal aorta was not enlarged and no bruit was heard [Clear Lung Fields] : clear lung fields [Good Air Entry] : good air entry [Soft] : abdomen soft [No Respiratory Distress] : no respiratory distress  [Normal Bowel Sounds] : normal bowel sounds [Normal Gait] : normal gait [Non Tender] : non-tender [No Edema] : no edema [No Cyanosis] : no cyanosis [No Varicosities] : no varicosities [No Clubbing] : no clubbing [No Skin Lesions] : no skin lesions [No Rash] : no rash [Moves all extremities] : moves all extremities [Normal Speech] : normal speech [No Focal Deficits] : no focal deficits [Alert and Oriented] : alert and oriented [Normal memory] : normal memory [S3] : no S3 [S4] : no S4 [Right Carotid Bruit] : no bruit heard over the right carotid [Left Carotid Bruit] : no bruit heard over the left carotid [Right Femoral Bruit] : no bruit heard over the right femoral artery [de-identified] : erlin [Left Femoral Bruit] : no bruit heard over the left femoral artery

## 2024-03-08 NOTE — REVIEW OF SYSTEMS
[Negative] : Heme/Lymph [SOB] : no shortness of breath [Dyspnea on exertion] : not dyspnea during exertion [Chest Discomfort] : no chest discomfort [Palpitations] : no palpitations [Leg Claudication] : no intermittent leg claudication [Syncope] : no syncope [Nausea] : no nausea [Change in Appetite] : no change in appetite [Constipation] : no constipation

## 2024-03-08 NOTE — DISCUSSION/SUMMARY
[Coronary Artery Disease] : coronary artery disease [Hyperlipidemia] : hyperlipidemia [Stable] : stable [Exercise] : exercise [Diet Modification] : diet modification [Weight Loss] : weight loss [<70] : goal is <70 [<150] : goal is <150 [>40] : goal is >40 [Patient] : the patient [Hypertension] : hypertension [FreeTextEntry1] : cough   likely from bronchitis , better with inhalers   patient stopped taking losartan , it does not look like drug related cough , will resume losartan ,    Atypical chest pain  : resolved on on resuming PPI , no evidence of ischemia on stress test , normal EF on echo , Patient had  CAD with multiple intervention in multiple vessel , last 2/2018 intermediate branch   RCA stent 2/28/20   likely from ,  GERD will continue  Protonix ,   HTN  hypertensive heart disease ,mild dilated aortic root  stable ,  mild elevated due to non adherence to low salt diet ,  continue  losartan 50 mg po daily , monitor renal function    , encourage patient to follow  low salt diet , home BP monitoring ,   Cardiac murmur carotid bruits  possibly from aortic sclerosis   Hyperlipidemia : continue medication , target LDL <70   chronic  LBBB   continue monitor   carotid  bruit : mild intimal thickening  no stenosis      follow up 3 months         [EKG obtained to assist in diagnosis and management of assessed problem(s)] : EKG obtained to assist in diagnosis and management of assessed problem(s)

## 2024-03-08 NOTE — HISTORY OF PRESENT ILLNESS
[FreeTextEntry1] : Patient  with history of hypertension, hyperlipidemia and coronary artery disease. status post stent placement 2006  LCX /2009 RCA .  LAD 9/2017 10/2017 , Intermediate artery  feb 2018 ,   RCA  stent  2/18/20 ,GERD came for follow up says feeling better , his cough is better on inhalers,  patient was told to stop losartan , without much improving ,   Patient denies any chest pan or shortness of breath , chronic bilateral leg pain , was seen by vascular without obvious etiology  does have chronic venous insufficiency    patient has chronic aching  both legs while standing , has varicose vein , no hx of  back pain , not related to walk  , ,  Patient had  arterial doppler of LE , no evidence of  significant disease seen , Patient had echo showed mild LVH normal EF mild DD , no evidence of ischemia on chemical stress test , fixed mid , apical inferior wall infarct    patient did have mild reflux symptoms had endoscopy , no recurrence of chest pain , occasionally occurs when he eat  certain type of food ,   patient had carotid doppler , showed mild intimal thickening , echo showed EF 56% no significant change from prior    patient recent blood work  glucose 109   , HbA1c 6.2 ,normal lipid profile  LDL 43  Nov 2023   Patient blood pressure is minimally elevated   , hyperlipidemia controlled

## 2024-03-08 NOTE — CARDIOLOGY SUMMARY
[de-identified] : 3/8/24 normal sinus rhythm LBBB  atypical  [de-identified] : 4/27/23  medium size mild  fixed mid inferior ,apical  inferior wall wall , inferior wall defect  suggestive infarct without ischemia  EF 54%  [de-identified] : 4/20/23 mild dilated aorta 3.9 cm , Mild LVH Mild DD EF 55-60% [de-identified] : 2/28/20 LM normal , Mid LAD 20% at prior stent , LCX OM1 25%,Mid RCA 99%\par  \par  2/12/18  RAMUS   ,LAD stent 9/2017,   RCA stent  2/28/20  [de-identified] : 5/20/22  mild intimal thickening

## 2024-04-24 ENCOUNTER — APPOINTMENT (OUTPATIENT)
Dept: VASCULAR SURGERY | Facility: CLINIC | Age: 74
End: 2024-04-24

## 2024-05-30 ENCOUNTER — NON-APPOINTMENT (OUTPATIENT)
Age: 74
End: 2024-05-30

## 2024-06-25 ENCOUNTER — APPOINTMENT (OUTPATIENT)
Dept: ORTHOPEDIC SURGERY | Facility: CLINIC | Age: 74
End: 2024-06-25
Payer: MEDICARE

## 2024-06-25 VITALS — WEIGHT: 148 LBS | HEIGHT: 65 IN | BODY MASS INDEX: 24.66 KG/M2

## 2024-06-25 DIAGNOSIS — M17.12 UNILATERAL PRIMARY OSTEOARTHRITIS, LEFT KNEE: ICD-10-CM

## 2024-06-25 DIAGNOSIS — M79.18 MYALGIA, OTHER SITE: ICD-10-CM

## 2024-06-25 DIAGNOSIS — M17.11 UNILATERAL PRIMARY OSTEOARTHRITIS, RIGHT KNEE: ICD-10-CM

## 2024-06-25 PROCEDURE — J3490M: CUSTOM

## 2024-06-25 PROCEDURE — 99204 OFFICE O/P NEW MOD 45 MIN: CPT | Mod: 25

## 2024-06-25 PROCEDURE — 73564 X-RAY EXAM KNEE 4 OR MORE: CPT | Mod: 50

## 2024-06-25 PROCEDURE — 20610 DRAIN/INJ JOINT/BURSA W/O US: CPT | Mod: 50

## 2024-07-19 NOTE — DISCHARGE NOTE ADULT - NS DC STATIN PRESCRIBED YN
This patient has been assessed with a concern for Malnutrition and was treated during this hospitalization for the following Nutrition diagnosis/diagnoses:     -  07/15/2024: Moderate protein-calorie malnutrition   yes

## 2024-07-29 NOTE — PATIENT PROFILE ADULT. - ARE SIGNIFICANT INDICATORS COMPLETE.
[FreeTextEntry1] : The patient has history of lymphoma being treated at [facility] treated with chemotherapy and Car T therapy .  The patient has a remote PCI at [facility] many years ago . He has had vague feeling in his chest .  He feels "skipped " heart beats and vague chest pain . The discomfort is not associated with symptms . He is able to play [employer]  without issues  The discomfort can come a go and last for an enitre day at time .  The patient is considered to be in remission .   5- The patient brought in his stents card . He had a cypher stent in his LAD . Nuclear stress test was negative for ischemia . Echo showed nomral Lv sysotolic function . Mild AS .   7- The patient has had cough  which which white in origin and some blood . The patient has been on 2-3 different antibiotics . This had worsened an dhe had chest pain which radiated to his back . The patient had increased Troponins which was thought to be a Type II MI . He has had a slow recovery . Feels fatigued . 
No

## 2024-08-09 ENCOUNTER — APPOINTMENT (OUTPATIENT)
Dept: CARDIOLOGY | Facility: CLINIC | Age: 74
End: 2024-08-09

## 2024-08-09 ENCOUNTER — NON-APPOINTMENT (OUTPATIENT)
Age: 74
End: 2024-08-09

## 2024-08-09 PROCEDURE — 99214 OFFICE O/P EST MOD 30 MIN: CPT

## 2024-08-09 PROCEDURE — G2211 COMPLEX E/M VISIT ADD ON: CPT

## 2024-08-09 PROCEDURE — 93000 ELECTROCARDIOGRAM COMPLETE: CPT

## 2024-08-09 NOTE — DISCUSSION/SUMMARY
[Coronary Artery Disease] : coronary artery disease [Hyperlipidemia] : hyperlipidemia [Stable] : stable [Diet Modification] : diet modification [Exercise] : exercise [Weight Loss] : weight loss [<70] : goal is <70 [<150] : goal is <150 [>40] : goal is >40 [Patient] : the patient [Hypertension] : hypertension [FreeTextEntry1] : cough   likely from bronchitis , better with inhalers   patient stopped taking losartan , it does not look like drug related cough , will resume losartan ,    Atypical chest pain  : resolved on on resuming PPI , no evidence of ischemia on stress test , normal EF on echo , Patient had  CAD with multiple intervention in multiple vessel , last 2/2018 intermediate branch   RCA stent 2/28/20   likely from ,  GERD will continue  Protonix ,   HTN  hypertensive heart disease ,mild dilated aortic root  stable ,  mild elevated due to non adherence to low salt diet ,  continue  losartan 50 mg po daily , monitor renal function    , encourage patient to follow  low salt diet , home BP monitoring ,   Cardiac murmur carotid bruits  possibly from aortic sclerosis   Hyperlipidemia : continue medication , target LDL <70   chronic  LBBB   continue monitor   carotid  bruit : mild intimal thickening  no stenosis      follow up 3 months         [EKG obtained to assist in diagnosis and management of assessed problem(s)] : EKG obtained to assist in diagnosis and management of assessed problem(s)

## 2024-08-09 NOTE — CARDIOLOGY SUMMARY
[de-identified] : 8/9/24 normal sinus rhythm LBBB  atypical  [de-identified] : 4/27/23  medium size mild  fixed mid inferior ,apical  inferior wall wall , inferior wall defect  suggestive infarct without ischemia  EF 54%  [de-identified] : 4/20/23 mild dilated aorta 3.9 cm , Mild LVH Mild DD EF 55-60% [de-identified] : 5/20/22  mild intimal thickening  [de-identified] : 2/28/20 LM normal , Mid LAD 20% at prior stent , LCX OM1 25%,Mid RCA 99%\par  \par  2/12/18  RAMUS   ,LAD stent 9/2017,   RCA stent  2/28/20

## 2024-08-09 NOTE — HISTORY OF PRESENT ILLNESS
[FreeTextEntry1] : Patient  with history of hypertension, hyperlipidemia and coronary artery disease. status post stent placement 2006  LCX /2009 RCA .  LAD 9/2017 10/2017 , Intermediate artery  feb 2018 ,   RCA  stent  2/18/20 ,GERD came for follow up says feeling fine , cough resolved ,    Patient denies any chest pan or shortness of breath ,  blood pressure is controlled ekg similar to prior ,   patient has chronic aching  both legs while standing , has varicose vein , no hx of  back pain , not related to walk  , ,  Patient had  arterial doppler of LE , no evidence of  significant disease seen , patient had intra articular injection with improvement  in leg pain ,   was evaluated by vascular without obvious etiology  does have chronic venous insufficiency   Patient had echo showed mild LVH normal EF mild DD , no evidence of ischemia on chemical stress test , fixed mid , apical inferior wall infarct    patient did have mild reflux symptoms had endoscopy , no recurrence of chest pain , occasionally occurs when he eat  certain type of food , patient does have symptoms when he skips medication for few days ,   patient had carotid doppler , showed mild intimal thickening , echo showed EF 56% no significant change from prior    patient recent blood work  glucose 104  , HbA1c 6.2 ,normal lipid profile  LDL 56  Ace 24   Patient blood pressure is minimally elevated   , hyperlipidemia controlled

## 2024-08-09 NOTE — PHYSICAL EXAM
[Well Developed] : well developed [Well Nourished] : well nourished [No Acute Distress] : no acute distress [Normal Conjunctiva] : normal conjunctiva [Normal Venous Pressure] : normal venous pressure [Normal S1, S2] : normal S1, S2 [No Rub] : no rub [No Gallop] : no gallop [Normal Rate] : normal [Normal S1] : normal S1 [Normal S2] : normal S2 [S3] : no S3 [S4] : no S4 [II] : a grade 2 [No Pitting Edema] : no pitting edema present [Right Carotid Bruit] : no bruit heard over the right carotid [Left Carotid Bruit] : no bruit heard over the left carotid [Right Femoral Bruit] : no bruit heard over the right femoral artery [Left Femoral Bruit] : no bruit heard over the left femoral artery [2+] : left 2+ [No Abnormalities] : the abdominal aorta was not enlarged and no bruit was heard [Clear Lung Fields] : clear lung fields [Good Air Entry] : good air entry [No Respiratory Distress] : no respiratory distress  [Soft] : abdomen soft [Non Tender] : non-tender [Normal Bowel Sounds] : normal bowel sounds [Normal Gait] : normal gait [No Edema] : no edema [No Cyanosis] : no cyanosis [No Clubbing] : no clubbing [No Varicosities] : no varicosities [No Rash] : no rash [No Skin Lesions] : no skin lesions [Moves all extremities] : moves all extremities [No Focal Deficits] : no focal deficits [Normal Speech] : normal speech [Alert and Oriented] : alert and oriented [Normal memory] : normal memory [de-identified] : erlin

## 2024-09-17 ENCOUNTER — OFFICE (OUTPATIENT)
Dept: URBAN - METROPOLITAN AREA CLINIC 70 | Facility: CLINIC | Age: 74
Setting detail: OPHTHALMOLOGY
End: 2024-09-17
Payer: MEDICARE

## 2024-09-17 DIAGNOSIS — Z96.1: ICD-10-CM

## 2024-09-17 DIAGNOSIS — H16.223: ICD-10-CM

## 2024-09-17 DIAGNOSIS — H01.001: ICD-10-CM

## 2024-09-17 DIAGNOSIS — H01.002: ICD-10-CM

## 2024-09-17 DIAGNOSIS — H11.153: ICD-10-CM

## 2024-09-17 DIAGNOSIS — H01.005: ICD-10-CM

## 2024-09-17 DIAGNOSIS — H02.831: ICD-10-CM

## 2024-09-17 DIAGNOSIS — H35.54: ICD-10-CM

## 2024-09-17 DIAGNOSIS — H01.004: ICD-10-CM

## 2024-09-17 DIAGNOSIS — H02.834: ICD-10-CM

## 2024-09-17 PROCEDURE — 92014 COMPRE OPH EXAM EST PT 1/>: CPT | Performed by: OPHTHALMOLOGY

## 2024-09-17 ASSESSMENT — LID EXAM ASSESSMENTS
OD_BLEPHARITIS: RLL RUL 2+
OD_DERMATOCHALASIS: 3+
OS_DERMATOCHALASIS: 3+
OS_BLEPHARITIS: LLL LUL 2+

## 2024-09-17 ASSESSMENT — CONFRONTATIONAL VISUAL FIELD TEST (CVF)
OS_FINDINGS: FULL
OD_FINDINGS: FULL

## 2024-09-17 ASSESSMENT — LID POSITION - DERMATOCHALASIS
OS_DERMATOCHALASIS: LUL 2+
OD_DERMATOCHALASIS: RUL 3+

## 2024-10-10 ENCOUNTER — RX RENEWAL (OUTPATIENT)
Age: 74
End: 2024-10-10

## 2024-10-16 ENCOUNTER — RX RENEWAL (OUTPATIENT)
Age: 74
End: 2024-10-16

## 2024-10-21 ENCOUNTER — EMERGENCY (EMERGENCY)
Facility: HOSPITAL | Age: 74
LOS: 0 days | Discharge: HOME | End: 2024-10-21
Attending: EMERGENCY MEDICINE
Payer: COMMERCIAL

## 2024-10-21 VITALS
HEART RATE: 66 BPM | TEMPERATURE: 98 F | DIASTOLIC BLOOD PRESSURE: 64 MMHG | SYSTOLIC BLOOD PRESSURE: 141 MMHG | OXYGEN SATURATION: 98 % | RESPIRATION RATE: 17 BRPM

## 2024-10-21 VITALS
HEART RATE: 68 BPM | RESPIRATION RATE: 17 BRPM | DIASTOLIC BLOOD PRESSURE: 79 MMHG | SYSTOLIC BLOOD PRESSURE: 144 MMHG | TEMPERATURE: 98 F | OXYGEN SATURATION: 97 %

## 2024-10-21 DIAGNOSIS — Z95.9 PRESENCE OF CARDIAC AND VASCULAR IMPLANT AND GRAFT, UNSPECIFIED: Chronic | ICD-10-CM
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
## 2024-10-21 DIAGNOSIS — R07.89 OTHER CHEST PAIN: ICD-10-CM

## 2024-10-21 DIAGNOSIS — I25.2 OLD MYOCARDIAL INFARCTION: ICD-10-CM
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
## 2024-10-21 DIAGNOSIS — M54.2 CERVICALGIA: ICD-10-CM

## 2024-10-21 DIAGNOSIS — K21.9 GASTRO-ESOPHAGEAL REFLUX DISEASE WITHOUT ESOPHAGITIS: ICD-10-CM

## 2024-10-21 DIAGNOSIS — I25.10 ATHEROSCLEROTIC HEART DISEASE OF NATIVE CORONARY ARTERY WITHOUT ANGINA PECTORIS: ICD-10-CM

## 2024-10-21 DIAGNOSIS — Z98.49 CATARACT EXTRACTION STATUS, UNSPECIFIED EYE: Chronic | ICD-10-CM

## 2024-10-21 DIAGNOSIS — I10 ESSENTIAL (PRIMARY) HYPERTENSION: ICD-10-CM

## 2024-10-21 DIAGNOSIS — Z95.5 PRESENCE OF CORONARY ANGIOPLASTY IMPLANT AND GRAFT: ICD-10-CM

## 2024-10-21 DIAGNOSIS — E78.5 HYPERLIPIDEMIA, UNSPECIFIED: ICD-10-CM
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
## 2024-10-21 DIAGNOSIS — Z98.890 OTHER SPECIFIED POSTPROCEDURAL STATES: Chronic | ICD-10-CM

## 2024-10-21 DIAGNOSIS — Z88.8 ALLERGY STATUS TO OTHER DRUGS, MEDICAMENTS AND BIOLOGICAL SUBSTANCES: ICD-10-CM

## 2024-10-21 PROCEDURE — 99284 EMERGENCY DEPT VISIT MOD MDM: CPT

## 2024-10-21 PROCEDURE — 71046 X-RAY EXAM CHEST 2 VIEWS: CPT | Mod: 26

## 2024-10-21 PROCEDURE — 71046 X-RAY EXAM CHEST 2 VIEWS: CPT

## 2024-10-21 PROCEDURE — 93010 ELECTROCARDIOGRAM REPORT: CPT

## 2024-10-21 PROCEDURE — 99283 EMERGENCY DEPT VISIT LOW MDM: CPT | Mod: 25

## 2024-10-21 PROCEDURE — 93005 ELECTROCARDIOGRAM TRACING: CPT
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
## 2024-10-21 NOTE — ED STATDOCS - CLINICAL SUMMARY MEDICAL DECISION MAKING FREE TEXT BOX
EKG was performed independently interpreted by myself reveal no acute findings.  Chest x-rays performed independently interpreted by myself reveal no acute findings.  Patient with muscular pain on exam.  I have no concern for serious traumatic injury.  Okay for discharge home at this time.  Recommend close outpatient follow-up, supportive care.  Strict return precautions given for any worsening.  Patient verbalized understanding and agreed to plan.

## 2024-10-21 NOTE — ED STATDOCS - MUSCULOSKELETAL, MLM
Mild TTP right paraspinal muscles and right trapezius with muscle spasm. Normal ROM. No bruising chest wall or abd. No reproducible chest wall tenderness.

## 2024-10-21 NOTE — ED STATDOCS - CARE PROVIDER_API CALL
Health Care Proxy (HCP)
Easton Morrison  Internal Medicine  [address] [place] [id number]  Phone: [phone]  Fax: [phone]  Follow Up Time: 4-6 Days  
Known

## 2024-10-21 NOTE — ED STATDOCS - PATIENT PORTAL LINK FT
You can access the FollowMyHealth Patient Portal offered by [facility] by registering at the following website: [email or web address]. By joining [facility]’s FollowMyHealth portal, you will also be able to view your health information using other applications (apps) compatible with our system.
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
## 2024-12-13 ENCOUNTER — NON-APPOINTMENT (OUTPATIENT)
Age: 74
End: 2024-12-13

## 2024-12-13 ENCOUNTER — APPOINTMENT (OUTPATIENT)
Dept: CARDIOLOGY | Facility: CLINIC | Age: 74
End: 2024-12-13
Payer: MEDICARE

## 2024-12-13 VITALS
SYSTOLIC BLOOD PRESSURE: 152 MMHG | HEART RATE: 72 BPM | BODY MASS INDEX: 23.66 KG/M2 | HEIGHT: 65 IN | OXYGEN SATURATION: 97 % | WEIGHT: 142 LBS | DIASTOLIC BLOOD PRESSURE: 68 MMHG

## 2024-12-13 DIAGNOSIS — I25.10 ATHEROSCLEROTIC HEART DISEASE OF NATIVE CORONARY ARTERY W/OUT ANGINA PECTORIS: ICD-10-CM

## 2024-12-13 DIAGNOSIS — K21.9 GASTRO-ESOPHAGEAL REFLUX DISEASE W/OUT ESOPHAGITIS: ICD-10-CM

## 2024-12-13 DIAGNOSIS — R09.89 OTHER SPECIFIED SYMPTOMS AND SIGNS INVOLVING THE CIRCULATORY AND RESPIRATORY SYSTEMS: ICD-10-CM

## 2024-12-13 DIAGNOSIS — I10 ESSENTIAL (PRIMARY) HYPERTENSION: ICD-10-CM

## 2024-12-13 DIAGNOSIS — E78.5 HYPERLIPIDEMIA, UNSPECIFIED: ICD-10-CM

## 2024-12-13 DIAGNOSIS — R01.1 CARDIAC MURMUR, UNSPECIFIED: ICD-10-CM

## 2024-12-13 PROCEDURE — G2211 COMPLEX E/M VISIT ADD ON: CPT

## 2024-12-13 PROCEDURE — 99214 OFFICE O/P EST MOD 30 MIN: CPT

## 2024-12-13 PROCEDURE — 93000 ELECTROCARDIOGRAM COMPLETE: CPT

## 2025-01-18 ENCOUNTER — NON-APPOINTMENT (OUTPATIENT)
Age: 75
End: 2025-01-18

## 2025-02-27 NOTE — ED PROVIDER NOTE - CROS ED HEME ALL NEG
Patient notified office she would like to cancel appointment and try to loose more weght and would call back to reschedule.  
negative...

## 2025-03-07 ENCOUNTER — APPOINTMENT (OUTPATIENT)
Dept: CARDIOLOGY | Facility: CLINIC | Age: 75
End: 2025-03-07
Payer: MEDICARE

## 2025-03-07 ENCOUNTER — NON-APPOINTMENT (OUTPATIENT)
Age: 75
End: 2025-03-07

## 2025-03-07 VITALS
SYSTOLIC BLOOD PRESSURE: 128 MMHG | DIASTOLIC BLOOD PRESSURE: 70 MMHG | OXYGEN SATURATION: 98 % | WEIGHT: 145 LBS | BODY MASS INDEX: 24.16 KG/M2 | HEIGHT: 65 IN | HEART RATE: 90 BPM

## 2025-03-07 DIAGNOSIS — I10 ESSENTIAL (PRIMARY) HYPERTENSION: ICD-10-CM

## 2025-03-07 DIAGNOSIS — R01.1 CARDIAC MURMUR, UNSPECIFIED: ICD-10-CM

## 2025-03-07 DIAGNOSIS — E78.5 HYPERLIPIDEMIA, UNSPECIFIED: ICD-10-CM

## 2025-03-07 DIAGNOSIS — I25.10 ATHEROSCLEROTIC HEART DISEASE OF NATIVE CORONARY ARTERY W/OUT ANGINA PECTORIS: ICD-10-CM

## 2025-03-07 DIAGNOSIS — R94.31 ABNORMAL ELECTROCARDIOGRAM [ECG] [EKG]: ICD-10-CM

## 2025-03-07 DIAGNOSIS — L29.9 PRURITUS, UNSPECIFIED: ICD-10-CM

## 2025-03-07 PROCEDURE — 93000 ELECTROCARDIOGRAM COMPLETE: CPT

## 2025-03-07 PROCEDURE — 99214 OFFICE O/P EST MOD 30 MIN: CPT

## 2025-03-07 PROCEDURE — G2211 COMPLEX E/M VISIT ADD ON: CPT

## 2025-03-07 RX ORDER — AMLODIPINE BESYLATE 5 MG/1
5 TABLET ORAL DAILY
Qty: 90 | Refills: 1 | Status: ACTIVE | COMMUNITY
Start: 2025-03-07 | End: 1900-01-01

## 2025-03-25 ENCOUNTER — APPOINTMENT (OUTPATIENT)
Dept: ORTHOPEDIC SURGERY | Facility: CLINIC | Age: 75
End: 2025-03-25
Payer: MEDICARE

## 2025-03-25 VITALS — HEIGHT: 65 IN | WEIGHT: 145 LBS | BODY MASS INDEX: 24.16 KG/M2

## 2025-03-25 DIAGNOSIS — M17.12 UNILATERAL PRIMARY OSTEOARTHRITIS, LEFT KNEE: ICD-10-CM

## 2025-03-25 DIAGNOSIS — M17.11 UNILATERAL PRIMARY OSTEOARTHRITIS, RIGHT KNEE: ICD-10-CM

## 2025-03-25 PROCEDURE — J3490M: CUSTOM | Mod: JZ

## 2025-03-25 PROCEDURE — 20610 DRAIN/INJ JOINT/BURSA W/O US: CPT | Mod: 50

## 2025-03-25 PROCEDURE — 99214 OFFICE O/P EST MOD 30 MIN: CPT | Mod: 25

## 2025-04-15 ENCOUNTER — APPOINTMENT (OUTPATIENT)
Dept: ORTHOPEDIC SURGERY | Facility: CLINIC | Age: 75
End: 2025-04-15
Payer: MEDICARE

## 2025-04-15 VITALS — HEIGHT: 65 IN | BODY MASS INDEX: 24.16 KG/M2 | WEIGHT: 145 LBS

## 2025-04-15 PROCEDURE — 20610 DRAIN/INJ JOINT/BURSA W/O US: CPT | Mod: LT

## 2025-04-15 PROCEDURE — 20610 DRAIN/INJ JOINT/BURSA W/O US: CPT | Mod: RT

## 2025-04-15 PROCEDURE — 99214 OFFICE O/P EST MOD 30 MIN: CPT | Mod: 25

## 2025-04-22 ENCOUNTER — APPOINTMENT (OUTPATIENT)
Dept: ORTHOPEDIC SURGERY | Facility: CLINIC | Age: 75
End: 2025-04-22
Payer: MEDICARE

## 2025-04-22 PROCEDURE — 99024 POSTOP FOLLOW-UP VISIT: CPT

## 2025-04-22 PROCEDURE — 20610 DRAIN/INJ JOINT/BURSA W/O US: CPT | Mod: 50

## 2025-04-29 ENCOUNTER — APPOINTMENT (OUTPATIENT)
Dept: ORTHOPEDIC SURGERY | Facility: CLINIC | Age: 75
End: 2025-04-29
Payer: MEDICARE

## 2025-04-29 ENCOUNTER — TRANSCRIPTION ENCOUNTER (OUTPATIENT)
Age: 75
End: 2025-04-29

## 2025-04-29 DIAGNOSIS — M17.11 UNILATERAL PRIMARY OSTEOARTHRITIS, RIGHT KNEE: ICD-10-CM

## 2025-04-29 DIAGNOSIS — M17.12 UNILATERAL PRIMARY OSTEOARTHRITIS, LEFT KNEE: ICD-10-CM

## 2025-04-29 PROCEDURE — 20610 DRAIN/INJ JOINT/BURSA W/O US: CPT | Mod: 50

## 2025-06-18 ENCOUNTER — NON-APPOINTMENT (OUTPATIENT)
Age: 75
End: 2025-06-18

## 2025-06-20 ENCOUNTER — APPOINTMENT (OUTPATIENT)
Dept: CARDIOLOGY | Facility: CLINIC | Age: 75
End: 2025-06-20
Payer: MEDICARE

## 2025-06-20 VITALS
HEIGHT: 65 IN | SYSTOLIC BLOOD PRESSURE: 150 MMHG | HEART RATE: 62 BPM | BODY MASS INDEX: 24.66 KG/M2 | WEIGHT: 148 LBS | OXYGEN SATURATION: 98 % | DIASTOLIC BLOOD PRESSURE: 78 MMHG

## 2025-06-20 PROCEDURE — G2211 COMPLEX E/M VISIT ADD ON: CPT

## 2025-06-20 PROCEDURE — 99214 OFFICE O/P EST MOD 30 MIN: CPT

## 2025-06-20 PROCEDURE — 93000 ELECTROCARDIOGRAM COMPLETE: CPT

## 2025-07-18 ENCOUNTER — APPOINTMENT (OUTPATIENT)
Dept: ORTHOPEDIC SURGERY | Facility: CLINIC | Age: 75
End: 2025-07-18
Payer: MEDICARE

## 2025-07-18 VITALS — WEIGHT: 147 LBS | HEIGHT: 65 IN | BODY MASS INDEX: 24.49 KG/M2

## 2025-07-18 PROCEDURE — 73562 X-RAY EXAM OF KNEE 3: CPT | Mod: 50

## 2025-07-18 PROCEDURE — 99204 OFFICE O/P NEW MOD 45 MIN: CPT

## 2025-08-07 ENCOUNTER — NON-APPOINTMENT (OUTPATIENT)
Age: 75
End: 2025-08-07

## 2025-08-26 ENCOUNTER — OUTPATIENT (OUTPATIENT)
Dept: OUTPATIENT SERVICES | Facility: HOSPITAL | Age: 75
LOS: 1 days | End: 2025-08-26
Payer: MEDICARE

## 2025-08-26 VITALS
DIASTOLIC BLOOD PRESSURE: 72 MMHG | OXYGEN SATURATION: 98 % | WEIGHT: 147.93 LBS | TEMPERATURE: 98 F | HEIGHT: 65 IN | RESPIRATION RATE: 18 BRPM | SYSTOLIC BLOOD PRESSURE: 158 MMHG | HEART RATE: 66 BPM

## 2025-08-26 DIAGNOSIS — M17.11 UNILATERAL PRIMARY OSTEOARTHRITIS, RIGHT KNEE: ICD-10-CM

## 2025-08-26 DIAGNOSIS — I25.10 ATHEROSCLEROTIC HEART DISEASE OF NATIVE CORONARY ARTERY WITHOUT ANGINA PECTORIS: ICD-10-CM

## 2025-08-26 DIAGNOSIS — M19.90 UNSPECIFIED OSTEOARTHRITIS, UNSPECIFIED SITE: ICD-10-CM

## 2025-08-26 DIAGNOSIS — Z98.49 CATARACT EXTRACTION STATUS, UNSPECIFIED EYE: Chronic | ICD-10-CM

## 2025-08-26 DIAGNOSIS — Z95.9 PRESENCE OF CARDIAC AND VASCULAR IMPLANT AND GRAFT, UNSPECIFIED: Chronic | ICD-10-CM

## 2025-08-26 DIAGNOSIS — Z98.890 OTHER SPECIFIED POSTPROCEDURAL STATES: Chronic | ICD-10-CM

## 2025-08-26 LAB
A1C WITH ESTIMATED AVERAGE GLUCOSE RESULT: 6.5 % — HIGH (ref 4–5.6)
ANION GAP SERPL CALC-SCNC: 12 MMOL/L — SIGNIFICANT CHANGE UP (ref 5–17)
BUN SERPL-MCNC: 12 MG/DL — SIGNIFICANT CHANGE UP (ref 7–23)
CALCIUM SERPL-MCNC: 10.3 MG/DL — SIGNIFICANT CHANGE UP (ref 8.4–10.5)
CHLORIDE SERPL-SCNC: 102 MMOL/L — SIGNIFICANT CHANGE UP (ref 96–108)
CO2 SERPL-SCNC: 24 MMOL/L — SIGNIFICANT CHANGE UP (ref 22–31)
CREAT SERPL-MCNC: 0.64 MG/DL — SIGNIFICANT CHANGE UP (ref 0.5–1.3)
EGFR: 98 ML/MIN/1.73M2 — SIGNIFICANT CHANGE UP
EGFR: 98 ML/MIN/1.73M2 — SIGNIFICANT CHANGE UP
ESTIMATED AVERAGE GLUCOSE: 140 MG/DL — HIGH (ref 68–114)
GLUCOSE SERPL-MCNC: 120 MG/DL — HIGH (ref 70–99)
HCT VFR BLD CALC: 43.7 % — SIGNIFICANT CHANGE UP (ref 39–50)
HGB BLD-MCNC: 14.2 G/DL — SIGNIFICANT CHANGE UP (ref 13–17)
MCHC RBC-ENTMCNC: 28.2 PG — SIGNIFICANT CHANGE UP (ref 27–34)
MCHC RBC-ENTMCNC: 32.5 G/DL — SIGNIFICANT CHANGE UP (ref 32–36)
MCV RBC AUTO: 86.9 FL — SIGNIFICANT CHANGE UP (ref 80–100)
MRSA PCR RESULT.: SIGNIFICANT CHANGE UP
NRBC # BLD AUTO: 0 K/UL — SIGNIFICANT CHANGE UP (ref 0–0)
NRBC # FLD: 0 K/UL — SIGNIFICANT CHANGE UP (ref 0–0)
NRBC BLD AUTO-RTO: 0 /100 WBCS — SIGNIFICANT CHANGE UP (ref 0–0)
PLATELET # BLD AUTO: 199 K/UL — SIGNIFICANT CHANGE UP (ref 150–400)
PMV BLD: 10.1 FL — SIGNIFICANT CHANGE UP (ref 7–13)
POTASSIUM SERPL-MCNC: 5.3 MMOL/L — SIGNIFICANT CHANGE UP (ref 3.5–5.3)
POTASSIUM SERPL-SCNC: 5.3 MMOL/L — SIGNIFICANT CHANGE UP (ref 3.5–5.3)
RBC # BLD: 5.03 M/UL — SIGNIFICANT CHANGE UP (ref 4.2–5.8)
RBC # FLD: 12.9 % — SIGNIFICANT CHANGE UP (ref 10.3–14.5)
S AUREUS DNA NOSE QL NAA+PROBE: SIGNIFICANT CHANGE UP
SODIUM SERPL-SCNC: 138 MMOL/L — SIGNIFICANT CHANGE UP (ref 135–145)
WBC # BLD: 7.36 K/UL — SIGNIFICANT CHANGE UP (ref 3.8–10.5)
WBC # FLD AUTO: 7.36 K/UL — SIGNIFICANT CHANGE UP (ref 3.8–10.5)

## 2025-08-26 PROCEDURE — G0463: CPT

## 2025-08-26 PROCEDURE — 80048 BASIC METABOLIC PNL TOTAL CA: CPT

## 2025-08-26 PROCEDURE — 73700 CT LOWER EXTREMITY W/O DYE: CPT | Mod: 26,RT

## 2025-08-26 PROCEDURE — 85027 COMPLETE CBC AUTOMATED: CPT

## 2025-08-26 PROCEDURE — 87640 STAPH A DNA AMP PROBE: CPT

## 2025-08-26 PROCEDURE — 73700 CT LOWER EXTREMITY W/O DYE: CPT

## 2025-08-26 PROCEDURE — 83036 HEMOGLOBIN GLYCOSYLATED A1C: CPT

## 2025-08-26 PROCEDURE — 87641 MR-STAPH DNA AMP PROBE: CPT

## 2025-09-01 ENCOUNTER — NON-APPOINTMENT (OUTPATIENT)
Age: 75
End: 2025-09-01

## 2025-09-02 ENCOUNTER — APPOINTMENT (OUTPATIENT)
Dept: CARDIOLOGY | Facility: CLINIC | Age: 75
End: 2025-09-02
Payer: MEDICARE

## 2025-09-02 VITALS
DIASTOLIC BLOOD PRESSURE: 86 MMHG | OXYGEN SATURATION: 98 % | HEIGHT: 65 IN | WEIGHT: 147.71 LBS | BODY MASS INDEX: 24.61 KG/M2 | SYSTOLIC BLOOD PRESSURE: 170 MMHG | HEART RATE: 64 BPM

## 2025-09-02 DIAGNOSIS — K21.9 GASTRO-ESOPHAGEAL REFLUX DISEASE W/OUT ESOPHAGITIS: ICD-10-CM

## 2025-09-02 DIAGNOSIS — R94.31 ABNORMAL ELECTROCARDIOGRAM [ECG] [EKG]: ICD-10-CM

## 2025-09-02 DIAGNOSIS — E78.5 HYPERLIPIDEMIA, UNSPECIFIED: ICD-10-CM

## 2025-09-02 DIAGNOSIS — R01.1 CARDIAC MURMUR, UNSPECIFIED: ICD-10-CM

## 2025-09-02 DIAGNOSIS — Z01.810 ENCOUNTER FOR PREPROCEDURAL CARDIOVASCULAR EXAMINATION: ICD-10-CM

## 2025-09-02 DIAGNOSIS — Z95.5 PRESENCE OF CORONARY ANGIOPLASTY IMPLANT AND GRAFT: ICD-10-CM

## 2025-09-02 DIAGNOSIS — I10 ESSENTIAL (PRIMARY) HYPERTENSION: ICD-10-CM

## 2025-09-02 PROCEDURE — 99214 OFFICE O/P EST MOD 30 MIN: CPT

## 2025-09-02 PROCEDURE — G2211 COMPLEX E/M VISIT ADD ON: CPT

## 2025-09-02 PROCEDURE — 93000 ELECTROCARDIOGRAM COMPLETE: CPT | Mod: NC

## 2025-09-15 PROBLEM — M19.90 UNSPECIFIED OSTEOARTHRITIS, UNSPECIFIED SITE: Chronic | Status: ACTIVE | Noted: 2025-08-26

## 2025-09-15 PROBLEM — R01.1 CARDIAC MURMUR, UNSPECIFIED: Chronic | Status: ACTIVE | Noted: 2025-08-26

## 2025-09-16 ENCOUNTER — TRANSCRIPTION ENCOUNTER (OUTPATIENT)
Age: 75
End: 2025-09-16

## 2025-09-16 ENCOUNTER — APPOINTMENT (OUTPATIENT)
Dept: ORTHOPEDIC SURGERY | Facility: HOSPITAL | Age: 75
End: 2025-09-16

## 2025-09-16 RX ORDER — LOSARTAN POTASSIUM 100 MG/1
1 TABLET, FILM COATED ORAL
Refills: 0 | DISCHARGE

## 2025-09-16 RX ORDER — ROSUVASTATIN CALCIUM 20 MG/1
1 TABLET, FILM COATED ORAL
Refills: 0 | DISCHARGE

## 2025-09-17 ENCOUNTER — TRANSCRIPTION ENCOUNTER (OUTPATIENT)
Age: 75
End: 2025-09-17

## 2025-09-17 RX ORDER — B1/B2/B3/B5/B6/B12/VIT C/FOLIC 500-0.5 MG
0 TABLET ORAL
Refills: 0 | DISCHARGE